# Patient Record
Sex: FEMALE | Race: OTHER | NOT HISPANIC OR LATINO | Employment: PART TIME | URBAN - METROPOLITAN AREA
[De-identification: names, ages, dates, MRNs, and addresses within clinical notes are randomized per-mention and may not be internally consistent; named-entity substitution may affect disease eponyms.]

---

## 2019-06-26 ENCOUNTER — HOSPITAL ENCOUNTER (OUTPATIENT)
Dept: LAB | Facility: HOSPITAL | Age: 54
Discharge: HOME | End: 2019-06-26
Attending: PSYCHIATRY & NEUROLOGY

## 2019-06-26 ENCOUNTER — HOSPITAL ENCOUNTER (OUTPATIENT)
Dept: SLEEP MEDICINE | Facility: HOSPITAL | Age: 54
Discharge: HOME | End: 2019-06-26
Attending: PSYCHIATRY & NEUROLOGY

## 2019-07-08 ENCOUNTER — HOSPITAL ENCOUNTER (OUTPATIENT)
Dept: LAB | Facility: HOSPITAL | Age: 54
Discharge: HOME | End: 2019-07-08
Attending: PSYCHIATRY & NEUROLOGY

## 2019-07-08 LAB
AMPHETAMINES UR QL SCN: POSITIVE
BARBITURATES UR QL SCN: NEGATIVE
BENZODIAZ UR QL SCN: NEGATIVE
CONV COCAINE, UR: NEGATIVE
METHADONE UR QL SCN: POSITIVE
OPIATES TESTED UR SCN: NEGATIVE
OXYCODONE UR QL SCN: NEGATIVE
PCP UR QL: NEGATIVE
THC SERPLBLD CFM-MCNC: NEGATIVE NG/ML

## 2019-10-23 ENCOUNTER — HOSPITAL ENCOUNTER (OUTPATIENT)
Dept: MRI IMAGING | Facility: HOSPITAL | Age: 54
Discharge: HOME | End: 2019-10-23
Attending: INTERNAL MEDICINE

## 2019-12-16 ENCOUNTER — HOSPITAL ENCOUNTER (OUTPATIENT)
Dept: URGENT CARE | Facility: CLINIC | Age: 54
Discharge: HOME | End: 2019-12-16
Attending: EMERGENCY MEDICINE

## 2020-02-14 ENCOUNTER — HOSPITAL ENCOUNTER (OUTPATIENT)
Dept: GENERAL RADIOLOGY | Facility: HOSPITAL | Age: 55
Discharge: HOME | End: 2020-02-14
Attending: INTERNAL MEDICINE

## 2020-04-16 ENCOUNTER — HOSPITAL ENCOUNTER (OUTPATIENT)
Dept: GENERAL RADIOLOGY | Facility: HOSPITAL | Age: 55
Discharge: HOME | End: 2020-04-16
Attending: INTERNAL MEDICINE

## 2020-11-05 ENCOUNTER — HOSPITAL ENCOUNTER (OUTPATIENT)
Dept: URGENT CARE | Facility: CLINIC | Age: 55
Discharge: HOME | End: 2020-11-05
Attending: INTERNAL MEDICINE

## 2020-11-07 LAB — SARS-COV-2 RNA SPEC QL NAA+PROBE: NOT DETECTED

## 2020-12-04 ENCOUNTER — HOSPITAL ENCOUNTER (OUTPATIENT)
Dept: GENERAL RADIOLOGY | Facility: HOSPITAL | Age: 55
Discharge: HOME | End: 2020-12-04
Attending: OTOLARYNGOLOGY

## 2020-12-14 ENCOUNTER — HOSPITAL ENCOUNTER (OUTPATIENT)
Dept: PREADMISSION TESTING | Facility: HOSPITAL | Age: 55
Discharge: HOME | End: 2020-12-14
Attending: OTOLARYNGOLOGY

## 2020-12-14 ENCOUNTER — HOSPITAL ENCOUNTER (OUTPATIENT)
Dept: OTHER | Facility: HOSPITAL | Age: 55
Discharge: HOME | End: 2020-12-14
Attending: OTOLARYNGOLOGY

## 2020-12-14 LAB
ANION GAP SERPL CALC-SCNC: 15 MMOL/L (ref 8–19)
APTT BLD: 23.2 S (ref 22.2–34.2)
BASOPHILS # BLD AUTO: 0.02 10*3/UL (ref 0–0.2)
BASOPHILS NFR BLD AUTO: 0.2 % (ref 0–3)
BUN SERPL-MCNC: 18 MG/DL (ref 5–25)
BUN/CREAT SERPL: 23 {RATIO} (ref 6–20)
CALCIUM SERPL-MCNC: 9.8 MG/DL (ref 8.7–10.4)
CHLORIDE SERPL-SCNC: 100 MMOL/L (ref 99–111)
CONV ABS IMM GRAN: 0.02 10*3/UL (ref 0–0.2)
CONV CO2: 29 MMOL/L (ref 22–32)
CONV IMMATURE GRAN: 0.2 % (ref 0–1.8)
CREAT UR-MCNC: 0.8 MG/DL (ref 0.5–0.9)
DEPRECATED RDW RBC AUTO: 43.3 FL (ref 36.4–46.3)
EOSINOPHIL # BLD AUTO: 0.04 10*3/UL (ref 0–0.7)
EOSINOPHIL # BLD AUTO: 0.5 % (ref 0–7)
ERYTHROCYTE [DISTWIDTH] IN BLOOD BY AUTOMATED COUNT: 13.2 % (ref 11.7–14.4)
GFR SERPLBLD BASED ON 1.73 SQ M-ARVRAT: >60 ML/MIN/{1.73_M2}
GLUCOSE SERPL-MCNC: 95 MG/DL (ref 65–99)
HCT VFR BLD AUTO: 42.6 % (ref 37–47)
HGB BLD-MCNC: 14.2 G/DL (ref 12–16)
INR PPP: 0.94 (ref 2–3)
LYMPHOCYTES # BLD AUTO: 3.3 10*3/UL (ref 1–5)
LYMPHOCYTES NFR BLD AUTO: 38.7 % (ref 20–45)
MCH RBC QN AUTO: 30 PG (ref 27–31)
MCHC RBC AUTO-ENTMCNC: 33.3 G/DL (ref 33–37)
MCV RBC AUTO: 90.1 FL (ref 81–99)
MONOCYTES # BLD AUTO: 0.4 10*3/UL (ref 0.2–1.2)
MONOCYTES NFR BLD AUTO: 4.7 % (ref 3–10)
NEUTROPHILS # BLD AUTO: 4.74 10*3/UL (ref 2–8)
NEUTROPHILS NFR BLD AUTO: 55.7 % (ref 30–85)
NRBC CBCN: 0 % (ref 0–0.7)
OSMOLALITY SERPL CALC.SUM OF ELEC: 292 MOSM/KG (ref 273–304)
PLATELET # BLD AUTO: 259 10*3/UL (ref 130–400)
PMV BLD AUTO: 11.3 FL (ref 9.4–12.3)
POTASSIUM SERPL-SCNC: 3.6 MMOL/L (ref 3.5–5.3)
PROTHROMBIN TIME: 10.2 S (ref 9.4–12)
RBC # BLD AUTO: 4.73 10*6/UL (ref 4.2–5.4)
SODIUM SERPL-SCNC: 140 MMOL/L (ref 135–147)
WBC # BLD AUTO: 8.52 10*3/UL (ref 4.8–10.8)

## 2020-12-16 LAB — SARS-COV-2 RNA SPEC QL NAA+PROBE: NOT DETECTED

## 2020-12-18 ENCOUNTER — HOSPITAL ENCOUNTER (OUTPATIENT)
Dept: PERIOP | Facility: HOSPITAL | Age: 55
Setting detail: HOSPITAL OUTPATIENT SURGERY
Discharge: HOME | End: 2020-12-18
Attending: OTOLARYNGOLOGY

## 2021-04-13 ENCOUNTER — HOSPITAL ENCOUNTER (OUTPATIENT)
Dept: SLEEP MEDICINE | Facility: HOSPITAL | Age: 56
Discharge: HOME | End: 2021-04-13
Attending: PSYCHIATRY & NEUROLOGY

## 2021-06-14 DIAGNOSIS — G47.419 NARCOLEPSY WITHOUT CATAPLEXY: Primary | ICD-10-CM

## 2021-06-14 RX ORDER — DEXTROAMPHETAMINE SACCHARATE, AMPHETAMINE ASPARTATE, DEXTROAMPHETAMINE SULFATE AND AMPHETAMINE SULFATE 2.5; 2.5; 2.5; 2.5 MG/1; MG/1; MG/1; MG/1
10 TABLET ORAL DAILY
Qty: 90 TABLET | Refills: 0 | Status: SHIPPED | OUTPATIENT
Start: 2021-06-14 | End: 2021-09-12

## 2021-07-27 PROCEDURE — U0003 INFECTIOUS AGENT DETECTION BY NUCLEIC ACID (DNA OR RNA); SEVERE ACUTE RESPIRATORY SYNDROME CORONAVIRUS 2 (SARS-COV-2) (CORONAVIRUS DISEASE [COVID-19]), AMPLIFIED PROBE TECHNIQUE, MAKING USE OF HIGH THROUGHPUT TECHNOLOGIES AS DESCRIBED BY CMS-2020-01-R: HCPCS | Performed by: NURSE PRACTITIONER

## 2021-07-27 PROCEDURE — U0005 INFEC AGEN DETEC AMPLI PROBE: HCPCS | Performed by: NURSE PRACTITIONER

## 2021-08-10 NOTE — PROGRESS NOTES
Subjective   Patient ID: Maia Hui is a 56 y.o. female is being seen for consultation today.  She is beng seen for low back pain with right  Sided sciatica.    3/2/21 MRI C spine [place]  3/2/21 MRI T spine [place]  3/2/21 MRI L spine [place]    Patient reports today low back pain with right and left sided sciatica.  She states she has neck pain also.When she turns her neck to the left she gets chest pain. The pain radiates to her arms and hands with numbness and tingling, weakness and heaviness.    I have not seen this patient in about 7 years.  She had an anterior cervical corpectomy at C6 with a cage and plate from C5 to C7 back in 2014.  She said she did fairly well for a while but her symptoms started to recur in the last few years or so. Mainly neck pain, some left scapular pain, and intermittent arm pain.  It was the right arm that was bothering her the most last time and that is generally doing well.   Also in the last year she has had low back pain and bilateral leg pain.  She takes Tylenol #3 from her primary care physician and she uses her ’s Flexeril.  Both help a bit.  She has not really been doing any exercise.  She has not been in any physical therapy recently.  We went over her radiographic studies.  Clearly she has some mechanical disease that can account for her symptoms but there is no severe nerve or root compression.  I certainly would not recommend any kind of surgery right now.   I think starting from basics again and putting her back in therapy and renewing her Flexeril would be helpful.  If that does not help then a referral to pain management in [place] would be helpful.  She does have a history of narcolepsy.            Back Pain  The pain is present in the lumbar spine. The quality of the pain is described as stabbing. The pain radiates to the left knee, right knee, left thigh and right thigh. The pain is at a severity of 4/10. The pain is worse during the night.  "The symptoms are aggravated by standing (activity). Associated symptoms include numbness, tingling and weakness. Pertinent negatives include no fever. She has tried home exercises, heat and analgesics for the symptoms. The treatment provided mild relief.   Neck Pain   The pain is present in the midline. The quality of the pain is described as stabbing. The pain is at a severity of 3/10. The symptoms are aggravated by position (looking up). The pain is same all the time. Associated symptoms include numbness, tingling and weakness. Pertinent negatives include no fever. She has tried home exercises and heat for the symptoms. The treatment provided mild relief.       The following portions of the patient's history were reviewed and updated as appropriate: allergies, current medications, past family history, past medical history, past social history, past surgical history and problem list.    Review of Systems   Constitutional: Negative for chills and fever.   HENT: Negative for congestion.    Eyes: Negative for visual disturbance.   Respiratory: Negative for shortness of breath.    Cardiovascular: Negative for palpitations.   Gastrointestinal: Negative for nausea.   Endocrine: Negative for cold intolerance.   Genitourinary: Positive for urgency.   Musculoskeletal: Positive for back pain and neck pain.   Skin: Negative for rash.   Neurological: Positive for tingling, weakness and numbness.   All other systems reviewed and are negative.          Objective     Vitals:    08/11/21 0921   BP: 128/78   Temp: 98 °F (36.7 °C)   Weight: 84.1 kg (185 lb 6.4 oz)   Height: 162.6 cm (64\")     Body mass index is 31.82 kg/m².      Physical Exam  Constitutional:       Appearance: She is well-developed.   HENT:      Head: Normocephalic and atraumatic.   Eyes:      Extraocular Movements: EOM normal.      Conjunctiva/sclera: Conjunctivae normal.      Pupils: Pupils are equal, round, and reactive to light.   Neck:      Vascular: No carotid " bruit.   Neurological:      Mental Status: She is oriented to person, place, and time.      Coordination: Finger-Nose-Finger Test and Heel to Shin Test normal.      Gait: Gait is intact.      Deep Tendon Reflexes:      Reflex Scores:       Tricep reflexes are 2+ on the right side and 2+ on the left side.       Bicep reflexes are 2+ on the right side and 2+ on the left side.       Brachioradialis reflexes are 2+ on the right side and 2+ on the left side.       Patellar reflexes are 2+ on the right side and 2+ on the left side.       Achilles reflexes are 2+ on the right side and 2+ on the left side.  Psychiatric:         Speech: Speech normal.       Neurologic Exam     Mental Status   Oriented to person, place, and time.   Registration of memory: Good recent and remote memory.   Attention: normal. Concentration: normal.   Speech: speech is normal   Level of consciousness: alert  Knowledge: consistent with education.     Cranial Nerves     CN II   Visual fields full to confrontation.   Visual acuity: normal    CN III, IV, VI   Pupils are equal, round, and reactive to light.  Extraocular motions are normal.     CN V   Facial sensation intact.   Right corneal reflex: normal  Left corneal reflex: normal    CN VII   Facial expression full, symmetric.   Right facial weakness: none  Left facial weakness: none    CN VIII   Hearing: intact    CN IX, X   Palate: symmetric    CN XI   Right sternocleidomastoid strength: normal  Left sternocleidomastoid strength: normal    CN XII   Tongue: not atrophic  Tongue deviation: none    Motor Exam   Muscle bulk: normal  Right arm tone: normal  Left arm tone: normal  Right leg tone: normal  Left leg tone: normal    Strength   Strength 5/5 except as noted.     Sensory Exam   Light touch normal.     Gait, Coordination, and Reflexes     Gait  Gait: normal    Coordination   Finger to nose coordination: normal  Heel to shin coordination: normal    Reflexes   Right brachioradialis: 2+  Left  brachioradialis: 2+  Right biceps: 2+  Left biceps: 2+  Right triceps: 2+  Left triceps: 2+  Right patellar: 2+  Left patellar: 2+  Right achilles: 2+  Left achilles: 2+  Right : 2+  Left : 2+          Assessment/Plan   Independent Review of Radiographic Studies:      I personally reviewed the images from the following studies.    I reviewed the cervical thoracic and lumbar MRIs done on 3/2/2021.  She is fused from C5-C7.  There is some adjacent level disease and disc bulging at C4-C5 and C7-T1.  The thoracic MRI is unremarkable not showing any cord compression or significant disc disease.  The lumbar MRI shows multilevel degenerative disc disease and some foraminal stenosis at L3-L4, L4-L5 and L5-S1 but frankly nothing dramatic.  There is no serious or severe cord compression in the neck as well.        Medical Decision Making:      Again, nothing operative.  I think physical therapy for both the neck and the low back is appropriate right now.  A referral to pain management would be reasonable if these things do not help.  I refilled the Flexeril for her own use.  We will see her in 4 months.      Diagnoses and all orders for this visit:    1. Cervical spinal stenosis (Primary)  -     Ambulatory Referral to Physical Therapy Evaluate and treat    2. DDD (degenerative disc disease), lumbar  -     Ambulatory Referral to Physical Therapy Evaluate and treat    3. History of fusion of cervical spine  -     Ambulatory Referral to Physical Therapy Evaluate and treat    Other orders  -     cyclobenzaprine (FLEXERIL) 10 MG tablet; Take 1 tablet by mouth 3 (Three) Times a Day As Needed for Muscle Spasms.  Dispense: 90 tablet; Refill: 3      Return in about 4 months (around 12/11/2021) for Face-to-face.

## 2021-08-11 ENCOUNTER — OFFICE VISIT (OUTPATIENT)
Dept: NEUROSURGERY | Facility: CLINIC | Age: 56
End: 2021-08-11

## 2021-08-11 VITALS
BODY MASS INDEX: 31.65 KG/M2 | WEIGHT: 185.4 LBS | TEMPERATURE: 98 F | SYSTOLIC BLOOD PRESSURE: 128 MMHG | HEIGHT: 64 IN | DIASTOLIC BLOOD PRESSURE: 78 MMHG

## 2021-08-11 DIAGNOSIS — Z98.1 HISTORY OF FUSION OF CERVICAL SPINE: ICD-10-CM

## 2021-08-11 DIAGNOSIS — M51.36 DDD (DEGENERATIVE DISC DISEASE), LUMBAR: ICD-10-CM

## 2021-08-11 DIAGNOSIS — M48.02 CERVICAL SPINAL STENOSIS: Primary | ICD-10-CM

## 2021-08-11 PROBLEM — M51.369 DDD (DEGENERATIVE DISC DISEASE), LUMBAR: Status: ACTIVE | Noted: 2021-08-11

## 2021-08-11 PROCEDURE — 99204 OFFICE O/P NEW MOD 45 MIN: CPT | Performed by: NEUROLOGICAL SURGERY

## 2021-08-11 RX ORDER — CYCLOBENZAPRINE HCL 10 MG
10 TABLET ORAL 3 TIMES DAILY PRN
Qty: 90 TABLET | Refills: 3 | Status: SHIPPED | OUTPATIENT
Start: 2021-08-11

## 2021-08-11 RX ORDER — NALOXONE HYDROCHLORIDE 4 MG/.1ML
SPRAY NASAL
COMMUNITY
Start: 2021-05-04

## 2021-08-16 ENCOUNTER — TRANSCRIBE ORDERS (OUTPATIENT)
Dept: ADMINISTRATIVE | Facility: HOSPITAL | Age: 56
End: 2021-08-16

## 2021-08-16 DIAGNOSIS — L03.211 FACIAL CELLULITIS: ICD-10-CM

## 2021-08-16 DIAGNOSIS — J32.9 CHRONIC SINUSITIS, UNSPECIFIED LOCATION: Primary | ICD-10-CM

## 2021-08-23 ENCOUNTER — TELEPHONE (OUTPATIENT)
Dept: NEUROSURGERY | Facility: CLINIC | Age: 56
End: 2021-08-23

## 2021-08-24 ENCOUNTER — TELEPHONE (OUTPATIENT)
Dept: NEUROSURGERY | Facility: CLINIC | Age: 56
End: 2021-08-24

## 2021-08-24 NOTE — TELEPHONE ENCOUNTER
Called and left message with patient, re: her fall a couple days ago with pain in the right leg and feeling cold, and onset of h/a and unsure if she hit her head.  Explained that she should go to the ER to get checked for possible fracture, or head bleed.

## 2021-08-26 ENCOUNTER — APPOINTMENT (OUTPATIENT)
Dept: CT IMAGING | Facility: HOSPITAL | Age: 56
End: 2021-08-26

## 2021-09-01 ENCOUNTER — TREATMENT (OUTPATIENT)
Dept: PHYSICAL THERAPY | Facility: CLINIC | Age: 56
End: 2021-09-01

## 2021-09-01 DIAGNOSIS — G89.29 CHRONIC MIDLINE LOW BACK PAIN WITH RIGHT-SIDED SCIATICA: ICD-10-CM

## 2021-09-01 DIAGNOSIS — M54.41 CHRONIC MIDLINE LOW BACK PAIN WITH RIGHT-SIDED SCIATICA: ICD-10-CM

## 2021-09-01 DIAGNOSIS — M54.12 RADICULOPATHY, CERVICAL: ICD-10-CM

## 2021-09-01 DIAGNOSIS — M54.2 CERVICAL PAIN: Primary | ICD-10-CM

## 2021-09-01 PROCEDURE — 97110 THERAPEUTIC EXERCISES: CPT | Performed by: PHYSICAL THERAPIST

## 2021-09-01 PROCEDURE — 97162 PT EVAL MOD COMPLEX 30 MIN: CPT | Performed by: PHYSICAL THERAPIST

## 2021-09-01 NOTE — PROGRESS NOTES
Physical Therapy Initial Evaluation and Plan of Care        Patient: Maia Hui   : 1965  Diagnosis/ICD-10 Code:  Cervical pain [M54.2]   Referring practitioner: Michael Joya MD  Date of Initial Visit: 2021  Today's Date: 2021  Patient seen for 1 sessions           Subjective Questionnaire: Oswestry:       Subjective Evaluation    History of Present Illness  Mechanism of injury: Patient reports that she has chronic lumbar and cervical spine pain for many years with no particular KAREY. Patient reports that she had a Cervical fusion of levels C 5-6,6-7 about 5 years ago.  Patient fell off a ladder last week twice.  Patient reports that she fell on her sacrum and then onto her right ankle.  Patient was placed in a R CAM boot last week and is waiting on an MRI.  Patient has had 3 previous surgeries on her R foot.  Since the fall her lower back and cervical pain has been flared up.  Patient reports radicular pain down her L UE to her thumb and first 2 fingers.  Patient reports that she is having radicular R LE pain to her calf.  Patient reports that she is taking flexoril.  Patient reports prolonged sitting, prolonged walking and standing, cervical extension and driving increases her pain.  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    Pain  Current pain rating: 3  At best pain rating: 3  At worst pain rating: 10  Aggravating factors: ambulation, lifting, stairs, sleeping, standing, movement, repetitive movement, outstretched reach and prolonged positioning  Progression: worsening    Social Support  Lives with: spouse    Hand dominance: right    Treatments  Previous treatment: physical therapy  Patient Goals  Patient goals for therapy: decreased pain, improved balance, increased motion, increased strength, independence with ADLs/IADLs and return to sport/leisure activities             Objective    Upper trapezius and sub-occiptial tightness noted  bilaterally  Hamstring and piriformis tightness noted bilaterally  Lumbar paraspinal tenderness and tightness noted bilaterally       Static Posture     Head  Forward.    Shoulders  Rounded.    Scapulae  Left protracted and right protracted.    Thoracic Spine  Hyperkyphosis.    Lumbar Spine   Decreased lordosis.     Knee   Genu valgus.   Knee (Left): Flexed.     Active Range of Motion   Cervical/Thoracic Spine   Cervical    Flexion: 26 degrees   Extension: 20 degrees   Left lateral flexion: 35 degrees   Right lateral flexion: 30 degrees     Additional Active Range of Motion Details  Lumbar AROM: all painful  Flexion: 25% limited  Extension: 75% limited  Lateral flexion: 75% limited bilaterally  Rotation: 75% limited bilaterally    Strength/Myotome Testing     Left Shoulder     Planes of Motion   Flexion: 4   Extension: 4   Abduction: 4     Right Shoulder     Planes of Motion   Flexion: 4   Extension: 4   Abduction: 4     Left Elbow   Flexion: 4+  Extension: 4+    Right Elbow   Flexion: 4+  Extension: 4+    Left Wrist/Hand   Wrist extension: 4+  Wrist flexion: 4+    Right Wrist/Hand   Wrist extension: 4+  Wrist flexion: 4+    Left Hip   Planes of Motion   Flexion: 4  Extension: 4-  Abduction: 4-    Right Hip   Planes of Motion   Flexion: 4-  Extension: 4-  Abduction: 4-    Left Knee   Flexion: 4+  Extension: 4+    Right Knee   Flexion: 4+  Extension: 4+    Left Ankle/Foot   Dorsiflexion: 4+    Right Ankle/Foot   Right ankle dorsiflexion strength: NT due to CAM boot.    Tests   Cervical     Left   Positive active compression ([place]).     Right   Positive active compression ([place]).     Lumbar     Left   Positive passive SLR and quadrant.     Right   Positive passive SLR and quadrant.      General Comments     Shoulder Comments   Bilateral UE and LE light touch sensation intact         Assessment & Plan     Assessment  Impairments: abnormal gait, abnormal muscle firing, abnormal or restricted ROM, activity  intolerance, impaired balance, impaired physical strength, lacks appropriate home exercise program, pain with function, safety issue and weight-bearing intolerance  Assessment details: Patient presents with signs/symptoms consistent with lower back pain with R LE radiculopathy and cervical pain with L UE radiculopathy including decreased lumbar/cervical ROM, bilateral hip, shoulder, scapular and core weakness and reports of pain limiting function during ADLs as shown on the WINIFRED. Patient would benefit from skilled PT services in order to address remaining deficits limiting function at this time.       Prognosis: good  Functional Limitations: carrying objects, lifting, sleeping, walking, pulling, pushing, uncomfortable because of pain, moving in bed, sitting, standing, stooping, reaching overhead and unable to perform repetitive tasks  Goals  Plan Goals: 1. The patient complains of lumbar/cervcial back pain.  LTG 1: 12 weeks:  The patient will report a pain rating of 2 or better in order to improve  tolerance to activities of daily living and improve sleep quality.  STATUS:  New  STG 1a: 6 weeks:  The patient will report a pain rating of 4 or better.  STATUS:  New  TREATMENT:  Therapeutic exercises, manual therapy, aquatic therapy, home exercise   instruction, and modalities as needed for pain to include:  electrical stimulation, moist heat, ice,   ultrasound, and diathermy.      2. The patient demonstrates weakness of the bilateral hips.  LTG 2: 12 weeks:  The patient will demonstrate 4+ /5 strength for bilateral hip flexion, abduction,  and extension in order to improve hip stability.  STATUS:  New  STG 2a: 6 weeks:  The patient will demonstrate 4 /5 strength for bilateral hip flexion, abduction,  and extension.  STATUS:  New  TREATMENT: Therapeutic exercises, manual therapy, aquatic therapy, home exercise instruction,  and modalities as needed for pain to include:  electrical stimulation, moist heat, ice,  ultrasound, and   diathermy.      3. The patient has gait dysfunction.  LTG 3: 12 weeks:  The patient will ambulate without assistive device, independently, for   community distances with minimal limp to the R lower extremity in order to improve mobility and allow   patient to perform activities such as grocery shopping with greater ease.  STATUS:  New  TREATMENT: Gait training, aquatic therapy, therapeutic exercise, and home exercise instruction.    4. The patient has limited cervical AROM  LTG 4: 12 weeks:  The patient will demonstrate lumbar AROM as follows: 35 degrees of flexion and 35 degrees of extension, 60 degrees of rotation bilaterally.  STATUS:  New  TREATMENT: Manual therapy, therapeutic exercise, home exercise instruction, and modalities as needed to include: moist heat, electrical stimulation, and ultrasound.      4. Mobility: Walking/Moving Around Functional Limitation    LTG 4: 12 weeks:  The patient will demonstrate 1-19 % limitation by achieving a score of 8/45 on the WINIFRED.  STATUS:  New  TREATMENT:  Manual therapy, therapeutic exercise, home exercise instruction, and modalities as needed to include: moist heat, electrical stimulation, and ultrasound.    Plan  Therapy options: will be seen for skilled physical therapy services  Planned modality interventions: cryotherapy, electrical stimulation/Russian stimulation and TENS  Planned therapy interventions: balance/weight-bearing training, ADL retraining, soft tissue mobilization, strengthening, stretching, therapeutic activities, joint mobilization, home exercise program, gait training, functional ROM exercises, flexibility, body mechanics training, postural training, neuromuscular re-education and manual therapy  Frequency: 3x week  Duration in weeks: 12  Treatment plan discussed with: patient        Timed:         Manual Therapy:    0     mins  33725;     Therapeutic Exercise:    10     mins  25665;     Neuromuscular Partha:    0    mins  55264;     Therapeutic Activity:     0     mins  22292;     Gait Trainin     mins  21109;     Ultrasound:     0     mins  01292;    Ionto                               0    mins   98451  Self-care  __0__ mins 07925    Un-Timed:  Electrical Stimulation:    0     mins  34497 ( );  Traction     0     mins 25557  Low Eval     0     Mins  89683  Mod Eval     40     Mins  13445  High Eval                       0     Mins  05361  Hot pack     0     Mins    Cold pack                       0     Mins      Timed Treatment:   10   mins   Total Treatment:     50   mins    PT SIGNATURE: Melissa Farah, MONIKA   DATE TREATMENT INITIATED: 2021    Initial Certification  Certification Period: 2021  I certify that the therapy services are furnished while this patient is under my care.  The services outlined above are required by this patient, and will be reviewed every 90 days.     PHYSICIAN: Michael Joya MD      DATE:     Please sign and return via fax to [phone].. Thank you, [facility].

## 2021-09-07 ENCOUNTER — HOSPITAL ENCOUNTER (OUTPATIENT)
Dept: CT IMAGING | Facility: HOSPITAL | Age: 56
Discharge: HOME | End: 2021-09-07
Admitting: PHYSICIAN ASSISTANT

## 2021-09-07 DIAGNOSIS — L03.211 FACIAL CELLULITIS: ICD-10-CM

## 2021-09-07 DIAGNOSIS — J32.9 CHRONIC SINUSITIS, UNSPECIFIED LOCATION: ICD-10-CM

## 2021-09-07 PROCEDURE — 70486 CT MAXILLOFACIAL W/O DYE: CPT

## 2021-09-09 ENCOUNTER — TREATMENT (OUTPATIENT)
Dept: PHYSICAL THERAPY | Facility: CLINIC | Age: 56
End: 2021-09-09

## 2021-09-09 DIAGNOSIS — M54.41 CHRONIC MIDLINE LOW BACK PAIN WITH RIGHT-SIDED SCIATICA: ICD-10-CM

## 2021-09-09 DIAGNOSIS — M54.2 CERVICAL PAIN: Primary | ICD-10-CM

## 2021-09-09 DIAGNOSIS — M54.12 RADICULOPATHY, CERVICAL: ICD-10-CM

## 2021-09-09 DIAGNOSIS — G89.29 CHRONIC MIDLINE LOW BACK PAIN WITH RIGHT-SIDED SCIATICA: ICD-10-CM

## 2021-09-09 PROCEDURE — 97110 THERAPEUTIC EXERCISES: CPT | Performed by: PHYSICAL THERAPIST

## 2021-09-09 PROCEDURE — 97140 MANUAL THERAPY 1/> REGIONS: CPT | Performed by: PHYSICAL THERAPIST

## 2021-09-09 NOTE — PROGRESS NOTES
"Physical Therapy Daily Progress Note        Patient: Maia Hui   : 1965  Diagnosis/ICD-10 Code:  Cervical pain [M54.2]  Referring practitioner: No ref. provider found  Date of Initial Visit: No linked episodes  Today's Date: 2021  Patient seen for Visit count could not be calculated. Make sure you are using a visit which is associated with an episode. sessions             Subjective   Maia Hui reports having general increase in pain and discomfort in neck, left arm, and lower back.  She reports persistent intermittent sharp -\"Stabbing\" pain left pectoralis with cervical rotation left.    Objective     Postural Observations:  Forward head - Rounded shoulder posture    See Exercise and Manual Logs for complete treatment.       Assessment/Plan   Pt tolerated therapy session moderately well - with initiation of therapeutic exercises and Manual therapy. She continues to have deficits in Cervical, Trunk, and bilateral Upper Extremty Flexibility,  Strength, and Stability; limiting function and ability to perform ADLs at this time.  Pt reported decrease in tightness in cervical musculature post session after Manual Therapy today.        Progress per Plan of Care           Timed:  Manual Therapy:    10     mins  32695;  Therapeutic Exercise:    28     mins  94344;     Neuromuscular Partha:    0    mins  11485;    Therapeutic Activity:     0     mins  90326;     Gait Trainin     mins  16341;     Ultrasound:     0     mins  39738;    Electrical Stimulation:    0     mins  30760;  Iontophoresis     0     mins  90756    Untimed:  Electrical Stimulation:    0     mins  83993 ( );  Mechanical Traction:    0     mins  69168;   Fluidotherapy     0     mins  30873  Hot pack     0     mins  39031  Cold pack     0     mins  48164    Timed Treatment:   38   mins   Total Treatment:     38   mins        Myra Askew PTA  Physical Therapist Assistant  "

## 2021-09-13 ENCOUNTER — TREATMENT (OUTPATIENT)
Dept: PHYSICAL THERAPY | Facility: CLINIC | Age: 56
End: 2021-09-13

## 2021-09-13 DIAGNOSIS — M54.12 RADICULOPATHY, CERVICAL: ICD-10-CM

## 2021-09-13 DIAGNOSIS — M54.41 CHRONIC MIDLINE LOW BACK PAIN WITH RIGHT-SIDED SCIATICA: ICD-10-CM

## 2021-09-13 DIAGNOSIS — M54.2 CERVICAL PAIN: Primary | ICD-10-CM

## 2021-09-13 DIAGNOSIS — G89.29 CHRONIC MIDLINE LOW BACK PAIN WITH RIGHT-SIDED SCIATICA: ICD-10-CM

## 2021-09-13 PROCEDURE — 97140 MANUAL THERAPY 1/> REGIONS: CPT | Performed by: PHYSICAL THERAPIST

## 2021-09-13 PROCEDURE — 97110 THERAPEUTIC EXERCISES: CPT | Performed by: PHYSICAL THERAPIST

## 2021-09-13 NOTE — PROGRESS NOTES
Physical Therapy Daily Progress Note        Patient: Maia Hui   : 1965  Diagnosis/ICD-10 Code:  Cervical pain [M54.2]  Referring practitioner: Michael Joya MD  Date of Initial Visit: Type: THERAPY  Noted: 2021  Today's Date: 2021  Patient seen for 3 sessions             Subjective   Maia Hui reports: left low back really bothering her last night and unable to get comfortable. She was trying to do her exercises but they didn't seem to help relieve the pain.     Objective   Increased discomfort with R Cervical Unilateral Posterior Anterior Mobs.     See Exercise, Manual, and Modality Logs for complete treatment.       Assessment/Plan  Maia still experiencing increased neck and low back  pain, especially at night. Pt had some increased discomfort with R Cervical UPAs. Pt would benefit from skilled PT to address Range of Motion  and Strength deficits, pain management and any concerns with ADLs.     Progress per Plan of Care           Timed:  Manual Therapy:    15     mins  21635;  Therapeutic Exercise:    10     mins  84568;     Neuromuscular Partha:        mins  72282;    Therapeutic Activity:          mins  54992;     Gait Training:           mins  59219;    Aquatic Therapy:          mins  17906;       Untimed:  Electrical Stimulation:         mins  45652 ( );  Mechanical Traction:         mins  94531;       Timed Treatment:   25   mins   Total Treatment:     25   mins        Marylu Duque PTA  Physical Therapist Assistant

## 2021-09-17 ENCOUNTER — TRANSCRIBE ORDERS (OUTPATIENT)
Dept: PHYSICAL THERAPY | Facility: CLINIC | Age: 56
End: 2021-09-17

## 2021-09-17 ENCOUNTER — TREATMENT (OUTPATIENT)
Dept: PHYSICAL THERAPY | Facility: CLINIC | Age: 56
End: 2021-09-17

## 2021-09-17 DIAGNOSIS — M25.571 RIGHT ANKLE PAIN, UNSPECIFIED CHRONICITY: Primary | ICD-10-CM

## 2021-09-17 DIAGNOSIS — M54.41 CHRONIC MIDLINE LOW BACK PAIN WITH RIGHT-SIDED SCIATICA: ICD-10-CM

## 2021-09-17 DIAGNOSIS — M54.2 CERVICAL PAIN: Primary | ICD-10-CM

## 2021-09-17 DIAGNOSIS — M54.12 RADICULOPATHY, CERVICAL: ICD-10-CM

## 2021-09-17 DIAGNOSIS — M25.571 RIGHT ANKLE PAIN, UNSPECIFIED CHRONICITY: ICD-10-CM

## 2021-09-17 DIAGNOSIS — G89.29 CHRONIC MIDLINE LOW BACK PAIN WITH RIGHT-SIDED SCIATICA: ICD-10-CM

## 2021-09-17 PROCEDURE — 97164 PT RE-EVAL EST PLAN CARE: CPT | Performed by: PHYSICAL THERAPIST

## 2021-09-17 PROCEDURE — 97110 THERAPEUTIC EXERCISES: CPT | Performed by: PHYSICAL THERAPIST

## 2021-09-17 NOTE — PROGRESS NOTES
Re-Assessment / Re-Certification      Patient: Maia Hui   : 1965  Diagnosis/ICD-10 Code:  Cervical pain [M54.2]  Referring practitioner: Michael Joya MD  Date of Initial Visit: Type: THERAPY  Noted: 2021  Today's Date: 2021  Patient seen for 4 sessions    Progress note due: 10/17/2021  Re-cert due: 2021    Subjective:     Subjective Questionnaire: LEFS: 38/80      Subjective Evaluation    History of Present Illness    Subjective comment: Pt states she fell 3 weeks ago and injured her R ankle. She had MRI last friday and there was nothing wrong. She had prevoius surguries in R ankle but this fall did not cause any damage.   Precautions and Work Restrictions: R boot during walking. Pain  Current pain ratin  At worst pain ratin         Objective          Passive Range of Motion   Left Ankle/Foot    Dorsiflexion (ke): 10 degrees   Plantar flexion: 35 degrees   Inversion: 25 degrees   Eversion: 15 degrees     Right Ankle/Foot    Dorsiflexion (ke): 5 degrees   Plantar flexion: 35 degrees   Inversion: 30 degrees   Eversion: 20 degrees     Strength/Myotome Testing     Left Ankle/Foot   Dorsiflexion: 5  Plantar flexion: 5  Inversion: 5  Eversion: 5    Right Ankle/Foot   Dorsiflexion: 4+  Plantar flexion: 4+  Inversion: 4+  Eversion: 4-    Swelling   Left Ankle/Foot   Figure 8: 57 cm    Right Ankle/Foot   Figure 8: 55 cm    Ambulation   Weight-Bearing Status   Weight-Bearing Status (Left): weight-bearing as tolerated   Weight-Bearing Status (Right): weight-bearing as tolerated     Additional Weight-Bearing Status Details  R boot     Observational Gait   Gait: antalgic   Decreased walking speed, stride length, right stance time, right swing time and right step length.       Assessment & Plan     Assessment  Impairments: abnormal gait, abnormal or restricted ROM, activity intolerance, impaired balance, impaired physical strength, pain with function and weight-bearing  intolerance  Functional Limitations: walking, uncomfortable because of pain, standing and unable to perform repetitive tasks  Goals  Plan Goals: ANKLE  PROBLEMS:  1. The patient has limited ROM for the L ankle.   LTG 1: 8 weeks:  In order to allow the patient greater ease with forward, lateral, and diagonal mobility the patient will demonstrate improved ROM of the L ankle as follows:  10 degrees of dorsiflexion, 35 degrees of inversion, and 20 degrees of eversion.    STATUS:  NEW     TREATMENT: Manual therapy, therapeutic exercise, home exercise instruction, and modalities as needed to include:  moist heat, electrical stimulation, ultrasound, and ice.    2. The patient has limited strength of the R ankle.   LTG 2: 8 weeks:  In order to provide greater joint stability of the R ankle the patient will demonstrate improved strength of the R ankle as follows:  5/5 dorsiflexion, 5/5 inversion, 5/5 eversion, and 5/5 plantar flexion.    STATUS:  NEW       TREATMENT: Therapeutic exercise, home exercise instruction, aquatic therapy.    3. The patient has gait dysfunction.   LTG 3: 8 weeks:  The patient will ambulate without assistive device, independently, for community distances with minimal limp to the R lower extremity in order to improve mobility and allow patient to perform activities such as grocery shopping with greater ease.    STATUS:  NEW      TREATMENT: Gait training, aquatic therapy, therapeutic exercise, and home exercise instruction.    4. Mobility: Walking/Moving Around Functional Limitation     LTG 4: 8 weeks:  The patient will demonstrate LEFS score to 58/80 to decrease limitation.     STATUS:  NEW   STG4a: 4 weeks:  The patient will demonstrate  LEFS score to 48/80 to decrease limitation.     STATUS:  NEW   TREATMENT:  Manual therapy, therapeutic exercise, home exercise instruction, and modalities as needed to include: moist heat, electrical stimulation, and ultrasound.        Plan  Therapy options: will be  seen for skilled physical therapy services  Planned modality interventions: cryotherapy, TENS and thermotherapy (hydrocollator packs)  Planned therapy interventions: balance/weight-bearing training, flexibility, functional ROM exercises, gait training, home exercise program, joint mobilization, manual therapy, neuromuscular re-education, soft tissue mobilization, strengthening, stretching and therapeutic activities  Frequency: 2x week  Duration in weeks: 8        Visit Diagnoses:    ICD-10-CM ICD-9-CM   1. Cervical pain  M54.2 723.1   2. Radiculopathy, cervical  M54.12 723.4   3. Chronic midline low back pain with right-sided sciatica  M54.41 724.2    G89.29 724.3     338.29   4. Right ankle pain, unspecified chronicity  M25.571 719.47         Recommendations: Continue as planned  Timeframe: 2 months  Prognosis to achieve goals: good    PT Signature: Hellen Chavarria PT, DPT      Based upon review of the patient's progress and continued therapy plan, it is my medical opinion that Maia Hui should continue physical therapy treatment at [facility]  [address] [place] [id number] [phone].    Signature: __________________________________  Michael Joya MD    Timed:         Manual Therapy:    0     mins  78759;     Therapeutic Exercise:    8     mins  70483;     Neuromuscular Partha:    0    mins  47684;    Therapeutic Activity:     0     mins  83231;     Gait Trainin     mins  43539;     Ultrasound:     0     mins  77113;    Ionto                               0    mins   01387  Self-care  __0__ mins 00763    Un-Timed:  Electrical Stimulation:    0     mins  84560 (MC );  Traction     0     mins 04342  Re- Eval     20     Mins     Hot pack     0     Mins    Cold pack                       0     Mins      Timed Treatment:   8   mins   Total Treatment:     23   mins

## 2021-09-24 ENCOUNTER — TREATMENT (OUTPATIENT)
Dept: PHYSICAL THERAPY | Facility: CLINIC | Age: 56
End: 2021-09-24

## 2021-09-24 DIAGNOSIS — M54.2 CERVICAL PAIN: Primary | ICD-10-CM

## 2021-09-24 DIAGNOSIS — M54.41 CHRONIC MIDLINE LOW BACK PAIN WITH RIGHT-SIDED SCIATICA: ICD-10-CM

## 2021-09-24 DIAGNOSIS — M25.571 RIGHT ANKLE PAIN, UNSPECIFIED CHRONICITY: ICD-10-CM

## 2021-09-24 DIAGNOSIS — M54.12 RADICULOPATHY, CERVICAL: ICD-10-CM

## 2021-09-24 DIAGNOSIS — G89.29 CHRONIC MIDLINE LOW BACK PAIN WITH RIGHT-SIDED SCIATICA: ICD-10-CM

## 2021-09-24 PROCEDURE — 97110 THERAPEUTIC EXERCISES: CPT | Performed by: PHYSICAL THERAPIST

## 2021-09-24 NOTE — PROGRESS NOTES
Physical Therapy Daily Progress Note        Patient: Maia Hui   : 1965  Diagnosis/ICD-10 Code:  Cervical pain [M54.2]  Referring practitioner: Michael Joya MD  Date of Initial Visit: Type: THERAPY  Noted: 2021  Today's Date: 2021  Patient seen for 5 sessions             Subjective   Maia Hui reports: no significant pain at this time.    Objective   Right ankle DF AROM: 7  See Exercise, Manual, and Modality Logs for complete treatment.       Assessment/Plan  Patient tolerated all exercise progressions and manual therapy well today but continues to demonstrate strength/ROM deficits limiting function. Continue to progress per patient tolerance.    Progress per Plan of Care           Timed:  Manual Therapy:    6     mins  86715;  Therapeutic Exercise:    24     mins  16025;     Neuromuscular Partha:    0    mins  29744;    Therapeutic Activity:     0     mins  47928;     Gait Trainin     mins  27273;     Ultrasound:     0     mins  06093;    Electrical Stimulation:    0     mins  08628;  Iontophoresis     0     mins  31711    Untimed:  Electrical Stimulation:    0     mins  29053 ( );  Mechanical Traction:    0     mins  31237;   Fluidotherapy     0     mins  38509  Hot pack     0     Mins    Cold pack                       0     Mins     Timed Treatment:   30   mins   Total Treatment:     30   mins        Melissa Farah PT  Physical Therapist

## 2021-09-28 DIAGNOSIS — G47.419 NARCOLEPSY WITHOUT CATAPLEXY: Primary | ICD-10-CM

## 2021-09-28 RX ORDER — DEXTROAMPHETAMINE SACCHARATE, AMPHETAMINE ASPARTATE MONOHYDRATE, DEXTROAMPHETAMINE SULFATE AND AMPHETAMINE SULFATE 5; 5; 5; 5 MG/1; MG/1; MG/1; MG/1
20 CAPSULE, EXTENDED RELEASE ORAL EVERY MORNING
Qty: 30 CAPSULE | Refills: 0 | OUTPATIENT
Start: 2021-09-28

## 2021-09-28 RX ORDER — DEXTROAMPHETAMINE SACCHARATE, AMPHETAMINE ASPARTATE, DEXTROAMPHETAMINE SULFATE AND AMPHETAMINE SULFATE 2.5; 2.5; 2.5; 2.5 MG/1; MG/1; MG/1; MG/1
TABLET ORAL
Qty: 90 TABLET | Refills: 0 | Status: SHIPPED | OUTPATIENT
Start: 2021-09-28 | End: 2021-12-16 | Stop reason: SDUPTHER

## 2021-09-29 ENCOUNTER — TREATMENT (OUTPATIENT)
Dept: PHYSICAL THERAPY | Facility: CLINIC | Age: 56
End: 2021-09-29

## 2021-09-29 DIAGNOSIS — M25.571 RIGHT ANKLE PAIN, UNSPECIFIED CHRONICITY: ICD-10-CM

## 2021-09-29 DIAGNOSIS — M54.2 CERVICAL PAIN: Primary | ICD-10-CM

## 2021-09-29 DIAGNOSIS — M54.41 CHRONIC MIDLINE LOW BACK PAIN WITH RIGHT-SIDED SCIATICA: ICD-10-CM

## 2021-09-29 DIAGNOSIS — M54.12 RADICULOPATHY, CERVICAL: ICD-10-CM

## 2021-09-29 DIAGNOSIS — G89.29 CHRONIC MIDLINE LOW BACK PAIN WITH RIGHT-SIDED SCIATICA: ICD-10-CM

## 2021-09-29 PROCEDURE — 97140 MANUAL THERAPY 1/> REGIONS: CPT | Performed by: PHYSICAL THERAPIST

## 2021-09-29 PROCEDURE — 97110 THERAPEUTIC EXERCISES: CPT | Performed by: PHYSICAL THERAPIST

## 2021-09-29 NOTE — PROGRESS NOTES
Physical Therapy Daily Progress Note        Patient: Maia Hui   : 1965  Diagnosis/ICD-10 Code:  Cervical pain [M54.2]  Referring practitioner: Michael Joya MD  Date of Initial Visit: No linked episodes  Today's Date: 2021  Patient seen for Visit count could not be calculated. Make sure you are using a visit which is associated with an episode. sessions             Subjective   Maia Hui reports that she has decided to come out of her boot on her right because it has been aggravating her back.  She denies having any pain in Right ankle today, but does continue to report having pain in left cervical and lower back- rating 3/10 upon arrival today.    Objective     Right Ankle Strength:   Dorsiflexion: 4+/5  Plantar flexion: 4+/5  Inversion: 4+/5  Eversion: 4-/5    Decreased eccentric foot / ankle strength    See Exercise, Manual, and Modality Logs for complete treatment.       Assessment/Plan     Pt tolerated therapy session moderately well - with initiation of therapeutic exercises and Manual therapy. She continues to have deficits in Cervical, Trunk, and Right Ankle Flexibility,  Strength, and Stability; limiting function and ability to perform ADLs at this time. Decrease in complaints of right ankle/foot pain reported.    Progress per Plan of Care           Timed:  Manual Therapy:    8     mins  03432;  Therapeutic Exercise:    45     mins  95775;     Neuromuscular Partha:    0    mins  60615;    Therapeutic Activity:     0     mins  61648;     Gait Trainin     mins  18404;     Ultrasound:     0     mins  50595;    Electrical Stimulation:    0     mins  43005;  Iontophoresis     0     mins  55979    Untimed:  Electrical Stimulation:    0     mins  73953 ( );  Mechanical Traction:    0     mins  35528;   Fluidotherapy     0     mins  15369  Hot pack     0     mins  21589  Cold pack     0     mins  10616    Timed Treatment:   53   mins   Total Treatment:     53    micheline Askew PTA  Physical Therapist Assistant

## 2021-09-30 ENCOUNTER — TREATMENT (OUTPATIENT)
Dept: PHYSICAL THERAPY | Facility: CLINIC | Age: 56
End: 2021-09-30

## 2021-09-30 DIAGNOSIS — M54.41 CHRONIC MIDLINE LOW BACK PAIN WITH RIGHT-SIDED SCIATICA: ICD-10-CM

## 2021-09-30 DIAGNOSIS — G89.29 CHRONIC MIDLINE LOW BACK PAIN WITH RIGHT-SIDED SCIATICA: ICD-10-CM

## 2021-09-30 DIAGNOSIS — M54.2 CERVICAL PAIN: Primary | ICD-10-CM

## 2021-09-30 DIAGNOSIS — M54.12 RADICULOPATHY, CERVICAL: ICD-10-CM

## 2021-09-30 DIAGNOSIS — M25.571 RIGHT ANKLE PAIN, UNSPECIFIED CHRONICITY: ICD-10-CM

## 2021-09-30 PROCEDURE — 97140 MANUAL THERAPY 1/> REGIONS: CPT | Performed by: PHYSICAL THERAPIST

## 2021-09-30 PROCEDURE — 97110 THERAPEUTIC EXERCISES: CPT | Performed by: PHYSICAL THERAPIST

## 2021-09-30 NOTE — PROGRESS NOTES
Physical Therapy Daily Progress Note        Patient: Maia Hui   : 1965  Diagnosis/ICD-10 Code:  Cervical pain [M54.2]  Referring practitioner: Michael Joya MD  Date of Initial Visit: Type: THERAPY  Noted: 2021  Today's Date: 2021  Patient seen for 7 sessions             Subjective   Maia Hui reports that her right ankle has been feeling better.  She reports having more discomfort at her Left Neck and lower back today.  She rates her pain 2/10 upon arrival today.    Objective     SLS:  20 sec Left  / Right 10-20 sec (Increased shaking noted Right LE)    See Exercise and Manual Logs for complete treatment.       Assessment/Plan     Pt tolerated therapy session moderately well - with progression of therapeutic exercises and Manual therapy. She continues to have deficits in Cervical, Trunk, and Right Ankle Flexibility,  Strength, and Stability; limiting function and ability to perform ADLs at this time. Decrease in complaints of right ankle/foot pain reported.  Pt continues to respond well to manual Therapy and soft tissue mobilization at cervical musculature - reporting decrease in pain and tightness post session today.    Progress per Plan of Care           Timed:  Manual Therapy:    15     mins  58488;  Therapeutic Exercise:    40     mins  75928;     Neuromuscular Partha:    0    mins  73771;    Therapeutic Activity:     0     mins  27539;     Gait Trainin     mins  12577;     Ultrasound:     0     mins  89144;    Electrical Stimulation:    0     mins  99149;  Iontophoresis     0     mins  07753    Untimed:  Electrical Stimulation:    0     mins  74747 (MC );  Mechanical Traction:    0     mins  55266;   Fluidotherapy     0     mins  91112  Hot pack     0     mins  63423  Cold pack     0     mins  97710    Timed Treatment:   55   mins   Total Treatment:     55   mins        Myra Askew PTA  Physical Therapist Assistant

## 2021-10-04 ENCOUNTER — TREATMENT (OUTPATIENT)
Dept: PHYSICAL THERAPY | Facility: CLINIC | Age: 56
End: 2021-10-04

## 2021-10-04 DIAGNOSIS — M54.2 CERVICAL PAIN: Primary | ICD-10-CM

## 2021-10-04 DIAGNOSIS — M54.41 CHRONIC MIDLINE LOW BACK PAIN WITH RIGHT-SIDED SCIATICA: ICD-10-CM

## 2021-10-04 DIAGNOSIS — M25.571 RIGHT ANKLE PAIN, UNSPECIFIED CHRONICITY: ICD-10-CM

## 2021-10-04 DIAGNOSIS — G89.29 CHRONIC MIDLINE LOW BACK PAIN WITH RIGHT-SIDED SCIATICA: ICD-10-CM

## 2021-10-04 DIAGNOSIS — M54.12 RADICULOPATHY, CERVICAL: ICD-10-CM

## 2021-10-04 PROCEDURE — 97110 THERAPEUTIC EXERCISES: CPT | Performed by: PHYSICAL THERAPIST

## 2021-10-04 PROCEDURE — 97140 MANUAL THERAPY 1/> REGIONS: CPT | Performed by: PHYSICAL THERAPIST

## 2021-10-04 NOTE — PROGRESS NOTES
Physical Therapy Daily Progress Note        Patient: Maia Hui   : 1965  Diagnosis/ICD-10 Code:  Cervical pain [M54.2]  Referring practitioner: Michael Joya MD  Date of Initial Visit: Type: THERAPY  Noted: 2021  Today's Date: 10/4/2021  Patient seen for 8 sessions             Subjective   Maia Hui reports: increased cervical and L shoulder pain at beginning of session today.    Objective   Bilateral hip abduction MMT: 4-/5  See Exercise, Manual, and Modality Logs for complete treatment.       Assessment/Plan  Patient tolerated all exercise progressions and manual therapy well today but continues to demonstrate strength/ROM deficits limiting function. Continue to progress per patient tolerance.    Progress per Plan of Care           Timed:  Manual Therapy:    15     mins  06667;  Therapeutic Exercise:    10     mins  01972;     Neuromuscular Partha:    0    mins  41052;    Therapeutic Activity:     0     mins  45634;     Gait Trainin     mins  46477;     Ultrasound:     0     mins  48303;    Electrical Stimulation:    0     mins  26521;  Iontophoresis     0     mins  65848    Untimed:  Electrical Stimulation:    0     mins  79316 ( );  Mechanical Traction:    0     mins  86341;   Fluidotherapy     0     mins  17886  Hot pack     0     Mins    Cold pack                       0     Mins     Timed Treatment:   25   mins   Total Treatment:     25   mins        Melissa Farah PT  Physical Therapist

## 2021-10-07 ENCOUNTER — TREATMENT (OUTPATIENT)
Dept: PHYSICAL THERAPY | Facility: CLINIC | Age: 56
End: 2021-10-07

## 2021-10-07 DIAGNOSIS — G89.29 CHRONIC MIDLINE LOW BACK PAIN WITH RIGHT-SIDED SCIATICA: ICD-10-CM

## 2021-10-07 DIAGNOSIS — M54.41 CHRONIC MIDLINE LOW BACK PAIN WITH RIGHT-SIDED SCIATICA: ICD-10-CM

## 2021-10-07 DIAGNOSIS — M54.12 RADICULOPATHY, CERVICAL: ICD-10-CM

## 2021-10-07 DIAGNOSIS — M25.571 RIGHT ANKLE PAIN, UNSPECIFIED CHRONICITY: ICD-10-CM

## 2021-10-07 DIAGNOSIS — M54.2 CERVICAL PAIN: Primary | ICD-10-CM

## 2021-10-07 PROCEDURE — 97110 THERAPEUTIC EXERCISES: CPT | Performed by: PHYSICAL THERAPIST

## 2021-10-07 PROCEDURE — 97140 MANUAL THERAPY 1/> REGIONS: CPT | Performed by: PHYSICAL THERAPIST

## 2021-10-07 NOTE — PROGRESS NOTES
"Physical Therapy Daily Progress Note        Patient: Maia Hui   : 1965  Diagnosis/ICD-10 Code:  Cervical pain [M54.2]  Referring practitioner: Michael Joya MD  Date of Initial Visit: Type: THERAPY  Noted: 2021  Today's Date: 10/7/2021  Patient seen for 9 sessions             Subjective   Maia Hui rates her neck and left shoulder pain at 2/10 upon arrival.   She reports having increased \"Numbness\" in right arm.  She does report that she has had numbness in her right arm for a long time, but states, \"It's gotten worse\".    Objective     Right Ankle Strength:   Dorsiflexion: 4+/5  Plantar flexion: 4+/5  Inversion: 4+/5  Eversion: 4-/5    See Exercise and Manual Logs for complete treatment.       Assessment/Plan     Pt tolerated therapy session moderately well - with progression of therapeutic exercises and Manual therapy. She has improved, but continues to have deficits in Cervical, Trunk, and Right Ankle Flexibility,  Strength, and Stability; limiting function and ability to perform ADLs at this time. She denies having any pain in right ankle/foot, but reports having persistent pain and popping in cervical region.  She has responded well to manual Therapy and soft tissue mobilization at cervical musculature - reporting decrease in pain and tightness post session, but denies any lasting relief.        Progress per Plan of Care           Timed:  Manual Therapy:    15     mins  88928;  Therapeutic Exercise:    30     mins  44460;     Neuromuscular Partha:    0    mins  77747;    Therapeutic Activity:     0     mins  51969;     Gait Trainin     mins  89493;     Ultrasound:     0     mins  71796;    Electrical Stimulation:    0     mins  49432;  Iontophoresis     0     mins  23339    Untimed:  Electrical Stimulation:    0     mins  96453 ( );  Mechanical Traction:    0     mins  18290;   Fluidotherapy     0     mins  95193  Hot pack     0     mins  38468  Cold pack     0     mins "  10737    Timed Treatment:   45   mins   Total Treatment:     45   mins        Myra Askew PTA  Physical Therapist Assistant

## 2021-10-11 ENCOUNTER — TREATMENT (OUTPATIENT)
Dept: PHYSICAL THERAPY | Facility: CLINIC | Age: 56
End: 2021-10-11

## 2021-10-11 DIAGNOSIS — M54.2 CERVICAL PAIN: Primary | ICD-10-CM

## 2021-10-11 DIAGNOSIS — G89.29 CHRONIC MIDLINE LOW BACK PAIN WITH RIGHT-SIDED SCIATICA: ICD-10-CM

## 2021-10-11 DIAGNOSIS — M25.571 RIGHT ANKLE PAIN, UNSPECIFIED CHRONICITY: ICD-10-CM

## 2021-10-11 DIAGNOSIS — M54.12 RADICULOPATHY, CERVICAL: ICD-10-CM

## 2021-10-11 DIAGNOSIS — M54.41 CHRONIC MIDLINE LOW BACK PAIN WITH RIGHT-SIDED SCIATICA: ICD-10-CM

## 2021-10-11 PROCEDURE — 97140 MANUAL THERAPY 1/> REGIONS: CPT | Performed by: PHYSICAL THERAPIST

## 2021-10-11 PROCEDURE — 97110 THERAPEUTIC EXERCISES: CPT | Performed by: PHYSICAL THERAPIST

## 2021-10-11 NOTE — PROGRESS NOTES
Physical Therapy Daily Progress Note        Patient: Maia Hui   : 1965  Diagnosis/ICD-10 Code:  Cervical pain [M54.2]  Referring practitioner: Michael Joya MD  Date of Initial Visit: No linked episodes  Today's Date: 10/11/2021  Patient seen for Visit count could not be calculated. Make sure you are using a visit which is associated with an episode. sessions             Subjective   Maia Hui reports: having increased pain and soreness in her right hip, right shoulder, and Left neck musculature.  She rates her pain at 2/10 upon arrival today.    Objective     Right Ankle Strength:   Dorsiflexion: 4+/5  Plantar flexion: 4+/5  Inversion: 4+/5  Eversion: 4-/5    See Exercise and Manual Logs for complete treatment.       Assessment/Plan     Pt tolerated therapy session moderately well - with progression of therapeutic exercises and Manual therapy. She has improved, but continues to have deficits in Cervical, Trunk, and Right Ankle Flexibility,  Strength, and Stability; limiting function and ability to perform ADLs at this time. She denies having any pain in right ankle/foot, but reports having persistent pain and popping in cervical region.  She has responded well to manual Therapy and soft tissue mobilization at cervical musculature - reporting decrease in pain and tightness post session.        Progress per Plan of Care           Timed:  Manual Therapy:    10     mins  10924;  Therapeutic Exercise:    35     mins  14653;     Neuromuscular Partha:    0    mins  36032;    Therapeutic Activity:     0     mins  33991;     Gait Trainin     mins  07532;     Ultrasound:     0     mins  07775;    Electrical Stimulation:    0     mins  40794;  Iontophoresis     0     mins  38996    Untimed:  Electrical Stimulation:    0     mins  96551 ( );  Mechanical Traction:    0     mins  13338;   Fluidotherapy     0     mins  76860  Hot pack     0     mins  70289  Cold pack     0     mins   41574    Timed Treatment:   45   mins   Total Treatment:     45   mins        Myra Askew PTA  Physical Therapist Assistant

## 2021-10-12 ENCOUNTER — OFFICE VISIT (OUTPATIENT)
Dept: SLEEP MEDICINE | Facility: HOSPITAL | Age: 56
End: 2021-10-12

## 2021-10-12 DIAGNOSIS — G47.33 OSA (OBSTRUCTIVE SLEEP APNEA): Primary | ICD-10-CM

## 2021-10-12 PROCEDURE — G0463 HOSPITAL OUTPT CLINIC VISIT: HCPCS

## 2021-10-14 ENCOUNTER — TREATMENT (OUTPATIENT)
Dept: PHYSICAL THERAPY | Facility: CLINIC | Age: 56
End: 2021-10-14

## 2021-10-14 DIAGNOSIS — M54.12 RADICULOPATHY, CERVICAL: ICD-10-CM

## 2021-10-14 DIAGNOSIS — G89.29 CHRONIC MIDLINE LOW BACK PAIN WITH RIGHT-SIDED SCIATICA: ICD-10-CM

## 2021-10-14 DIAGNOSIS — M54.2 CERVICAL PAIN: Primary | ICD-10-CM

## 2021-10-14 DIAGNOSIS — M54.41 CHRONIC MIDLINE LOW BACK PAIN WITH RIGHT-SIDED SCIATICA: ICD-10-CM

## 2021-10-14 DIAGNOSIS — M25.571 RIGHT ANKLE PAIN, UNSPECIFIED CHRONICITY: ICD-10-CM

## 2021-10-14 PROCEDURE — 97110 THERAPEUTIC EXERCISES: CPT | Performed by: PHYSICAL THERAPIST

## 2021-10-14 PROCEDURE — 97140 MANUAL THERAPY 1/> REGIONS: CPT | Performed by: PHYSICAL THERAPIST

## 2021-10-14 NOTE — PROGRESS NOTES
Physical Therapy Daily Progress Note        Patient: Maia Hui   : 1965  Diagnosis/ICD-10 Code:  Cervical pain [M54.2]  Referring practitioner: Michael Joya MD  Date of Initial Visit: Type: THERAPY  Noted: 2021  Today's Date: 10/14/2021  Patient seen for 11 sessions             Subjective   Maia Hui reports feeling pretty good today.  She rates her neck pain at 2/10 upon arrival today.  She denies having any right ankle pain.      Objective     Right Ankle Strength:   Dorsiflexion: 4+/5  Plantar flexion: 4+/5  Inversion: 4+/5  Eversion: 4-/5      See Exercise and Manual Logs for complete treatment.       Assessment/Plan     Pt tolerated therapy session well - with progression of therapeutic exercises and Manual therapy. She has improved, but continues to have deficits in Cervical, Trunk, and Right Ankle Flexibility, Strength, and Stability; limiting function and ability to perform ADLs at this time. She denies having any pain in right ankle/foot, but reports having persistent intermittent pain and popping in cervical region. She has responded well to manual Therapy and soft tissue mobilization at cervical musculature - reporting decrease in pain and tightness post session.    Progress per Plan of Care           Timed:  Manual Therapy:    10     mins  54078;  Therapeutic Exercise:    35     mins  20951;     Neuromuscular Partha:    0    mins  43555;    Therapeutic Activity:     0     mins  27480;     Gait Trainin     mins  94435;     Ultrasound:     0     mins  16212;    Electrical Stimulation:    0     mins  08246;  Iontophoresis     0     mins  20231    Untimed:  Electrical Stimulation:    0     mins  90981 ( );  Mechanical Traction:    0     mins  00331;   Fluidotherapy     0     mins  45527  Hot pack     0     mins  12065  Cold pack     0     mins  92845    Timed Treatment:   45   mins   Total Treatment:     45   mins        Myra Askew PTA  Physical Therapist  Assistant

## 2021-10-18 ENCOUNTER — TELEPHONE (OUTPATIENT)
Dept: ORTHOPEDICS | Facility: OTHER | Age: 56
End: 2021-10-18

## 2021-10-18 NOTE — TELEPHONE ENCOUNTER
PATIENT IS NOT FEELING WELL WOULD RATHER NOT RISK ANYONE. SINCE THIS IS A RE-EVAL SHE WOULD LIKE IF SOMEONE COULD CALL HER ABOUT WHAT TO DO IN ORDER TO SCHEDULE THE RE -EVAL.

## 2021-10-24 NOTE — PROGRESS NOTES
[facility]    SLEEP CLINIC FOLLOW UP PROGRESS NOTE.    Maia Hui  1965  56 y.o.  female      PCP: Josephine Torres MD      Date of visit: 10/12/2021  Patient is a 56 years old female who is returning for follow-up visit.  Reason for a follow-up visit: Narcolepsy without cataplexy and medication follow-up                                                Obstructive sleep apnea    HPI:  1.  Obstructive sleep apnea  This is a 56 y.o. years old patient who has a history of obstructive sleep apnea.  She is here for a compliance follow-up.  She has had history of obstructive sleep apnea diagnosed for several years and has been treated initially with BiPAP and later switched to CPAP after her sleep apnea improved following weight loss.  Her most recent sleep study done on 4/7/2011 showed an AHI of 6.1 events per hour total sleep time.  Total RDI was 66.2 events per hour.  She elected to continue using her CPAP machine.  She was last seen on 4/13/2021 at which time, she reported that due to sinus infections and surgeries, she has been unable to use her CPAP machine regularly.  She now  reports that using the CPAP machine causes irritation of her sinuses and throat and she wakes up feeling extremely dry in her mouth and sinuses, however, if she does not use her CPAP machine, she cannot sleep.  She is currently being considered for inspire treatment for her sleep apnea.  She reports that further sinus surgeries are  being contemplated in the future.  Endoscopy procedure for inspire therapy has been scheduled around November 22.  She normally goes to bed at 11 PM and wakes up at 8 AM getting approximately 4 to 6 hours of sleep during the night..  The patient wakes up several time(s) during the night.     2.  Narcolepsy without cataplexy  She was diagnosed at the [facility] sleep disorder center with narcolepsy without cataplexy.  She is currently taking Adderall XR 20 mg 1 in the morning  (she gets this prescription from Dr. PEPE Torres) and Adderall immediate release 10 mg 1 tablet in the afternoon (she gets this prescription from us).  She reports that as far as this is concerned the medications help control her daytime somnolence.  She has not noted any side effects from the medication.    Mediactions and allergies are reviewed by me and documented in the encounter.     SOCIAL ( habits pertaining to sleep medicine)  History of smoking:No   History of alcohol use: 0 per week  Caffeine use: 2 cups of coffee    REVIEW OF SYSTEMS:   [place] Sleepiness Scale :Total score: 8   Nsaal congestion: Yes  Dry mouth/nose: Yes  Post nasal drip; no  Acid reflux/Heartburn: No  Abd bloating: No  Morining headache: Yes  Anxiety: No  Depression: No    Physical Exam :  CONSTITUTIONAL:There were no vitals filed for this visit. There is no height or weight on file to calculate BMI.   Neck: No masses.  No carotid bruits  ENT: Mallampati class II.  Oral mucosa appears moist  RESP SYSTEM: Breath sounds are normal, no wheezes or crackles  CARDIOVASULAR: Heart rate is regular without murmur.  Neuropsych: She is alert and oriented responses are coherent and relevant.  Mood and affect appeared appropriate.    Data reviewed:  The Smart card downloaded on 10/12/2021 has been reviewed independently by me for compliance and discussed the data with the patient.   Compliance; 100%  More than 4 hr use, 100%  Average use of the device 8 hours 43-minute per night  Residual AHI: 4.4 /hr (goal < 5.0 /hr)  Mask type: Nasal pillows  DME: [employer]     ASSESSMENT AND PLAN:  · Obstructive sleep apnea ( G 47.33).  The symptoms of sleep apnea have improved with the device and the treatment.  Patient's compliance with the device is excellent for treatment of sleep apnea.  I have independently reviewed the smart card down load and discussed with the patient the download data and encouraged  the patient to continue to use the device.The residual AHI  is acceptable. The patient is also instructed to get the supplies from the [employer] and and change them on a regular basis.  A prescription for supplies has been sent to the [employer] company.  I have also discussed the good sleep hygiene habits and adequate amount of sleep needed for good health.  · Narcolepsy without cataplexy stable on medication   · Xerostomia    I suggested that the patient wear a chinstrap however, she says that she tried this before and she is not comfortable with it  I suggested decreasing her CPAP pressure to 6 cm water.  I also suggested that she may benefit from a heated tube  She is going to continue taking Adderall XR 20 mg, 1 in the morning and Adderall 10 mg on in the afternoon.  She was asked to call for her medication refills when due.  Her questions regarding inspire treatment and sleep studies were likewise addressed.  · Return in about 6 months (around 4/12/2022). . Patient's questions were answered.      Justina Sharp MD  10/24/2021

## 2021-10-27 ENCOUNTER — TREATMENT (OUTPATIENT)
Dept: PHYSICAL THERAPY | Facility: CLINIC | Age: 56
End: 2021-10-27

## 2021-10-27 DIAGNOSIS — M54.41 CHRONIC MIDLINE LOW BACK PAIN WITH RIGHT-SIDED SCIATICA: ICD-10-CM

## 2021-10-27 DIAGNOSIS — M54.12 RADICULOPATHY, CERVICAL: ICD-10-CM

## 2021-10-27 DIAGNOSIS — G89.29 CHRONIC MIDLINE LOW BACK PAIN WITH RIGHT-SIDED SCIATICA: ICD-10-CM

## 2021-10-27 DIAGNOSIS — M25.571 RIGHT ANKLE PAIN, UNSPECIFIED CHRONICITY: ICD-10-CM

## 2021-10-27 DIAGNOSIS — M54.2 CERVICAL PAIN: Primary | ICD-10-CM

## 2021-10-27 PROCEDURE — 97110 THERAPEUTIC EXERCISES: CPT | Performed by: PHYSICAL THERAPIST

## 2021-10-27 NOTE — PROGRESS NOTES
"Progress note    Patient: Maia Hui   : 1965  Diagnosis/ICD-10 Code:  Cervical pain [M54.2]  Referring practitioner: Michael Joya MD  Date of Initial Visit: Type: THERAPY  Noted: 2021  Today's Date: 10/27/2021  Patient seen for 12 sessions    Progress note due: 2021  Re-cert due: 2022      Subjective:   Maia Hui reports: that her lower back pain has not been significant the past several weeks but her cervical pain has remained unchanged since last re-eval. Patient reports that she continues to have radicular pain down the R UE to hand but this has improved some due to \"not lasting as long as it used to when it happens\".  Pt reports no pain or difficulty with the R ankle the past several weeks and states that she has been able to complete all ADLs with no ankle pain/difficulty.  Pt reports increased R anterior and lateral hip pain the last several days with no particular KAREY that worsens with hip/lumbar flexion.   Subjective Questionnaire:   WINIFRED: 22/45, LEFS: 45/80  Clinical Progress: improved  Home Program Compliance: Yes  Treatment has included: therapeutic exercise and manual therapy    Subjective   Objective          Active Range of Motion   Cervical/Thoracic Spine   Cervical    Flexion: WFL  Extension: 20 degrees with pain  Left rotation: 45 degrees with pain  Right rotation: 65 degrees     Right Ankle/Foot   Dorsiflexion (ke): 10 degrees   Plantar flexion: WFL  Inversion: 35 degrees   Eversion: 20 degrees     Additional Active Range of Motion Details  Lumbar AROM:  Flexion: 25% limited (right hip pain)  Extension: WFL (pain in lower back)  Rotation: 25% limited bilaterally  Lateral flexion: 25% limited bilaterally    Strength/Myotome Testing     Left Hip   Planes of Motion   Flexion: 4+  Extension: 4-  Abduction: 4-    Right Hip   Planes of Motion   Flexion: 4-  Extension: 4-  Abduction: 4-    Left Knee   Flexion: 4+  Extension: 4+    Right Knee   Flexion: 4+  Extension: " 4+    Right Ankle/Foot   Dorsiflexion: 5  Plantar flexion: 4+  Inversion: 5  Eversion: 5    Tests     Right Hip   Positive GEOFF and coni.       Assessment & Plan     Assessment  Impairments: abnormal gait, abnormal muscle firing, abnormal or restricted ROM, activity intolerance, impaired balance, impaired physical strength, lacks appropriate home exercise program, pain with function, safety issue and weight-bearing intolerance  Assessment details: Pt demonstrates improvements in R ankle strength, ROM but continues to demonstrate decreased bilateral hip, core, scapular strength and decreased lumbar and cervical ROM as well as reports of pain and radicular R UE pain limiting function as shown on the WINIFRED.  Pt would continue to benefit from skilled PT services in order to address remaining deficits limiting function.   Prognosis: good  Functional Limitations: lifting, sleeping, walking, pulling, pushing, uncomfortable because of pain, moving in bed, sitting, standing, stooping and unable to perform repetitive tasks  Goals  Plan Goals: 1. The patient complains of lumbar/cervcial back pain.  LTG 1: 12 weeks:  The patient will report a pain rating of 2 or better in order to improve  tolerance to activities of daily living and improve sleep quality.  STATUS:  Ongoing   STG 1a: 6 weeks:  The patient will report a pain rating of 4 or better.  STATUS:  Not met, continue  TREATMENT:  Therapeutic exercises, manual therapy, aquatic therapy, home exercise   instruction, and modalities as needed for pain to include:  electrical stimulation, moist heat, ice,   ultrasound, and diathermy.      2. The patient demonstrates weakness of the bilateral hips.  LTG 2: 12 weeks:  The patient will demonstrate 4+ /5 strength for bilateral hip flexion, abduction,  and extension in order to improve hip stability.  STATUS:  Ongoing  STG 2a: 6 weeks:  The patient will demonstrate 4 /5 strength for bilateral hip flexion, abduction,  and  extension.  STATUS:  Not met, continue  TREATMENT: Therapeutic exercises, manual therapy, aquatic therapy, home exercise instruction,  and modalities as needed for pain to include:  electrical stimulation, moist heat, ice, ultrasound, and   diathermy.      3. The patient has gait dysfunction.  LTG 3: 12 weeks:  The patient will ambulate without assistive device, independently, for   community distances with minimal limp to the R lower extremity in order to improve mobility and allow   patient to perform activities such as grocery shopping with greater ease.  STATUS:  Ongoing  TREATMENT: Gait training, aquatic therapy, therapeutic exercise, and home exercise instruction.    4. The patient has limited cervical AROM  LTG 4: 12 weeks:  The patient will demonstrate lumbar AROM as follows: 35 degrees of flexion and 35 degrees of extension, 60 degrees of rotation bilaterally.  STATUS:  Ongoing  TREATMENT: Manual therapy, therapeutic exercise, home exercise instruction, and modalities as needed to include: moist heat, electrical stimulation, and ultrasound.      4. Mobility: Walking/Moving Around Functional Limitation                   LTG 4: 12 weeks:  The patient will demonstrate 1-19 % limitation by achieving a score of 8/45 on the WINIFRED.  STATUS:  Ongoing  TREATMENT:  Manual therapy, therapeutic exercise, home exercise instruction, and modalities as needed to include: moist heat, electrical stimulation, and ultrasound.  1. The patient has limited ROM for the L ankle.              LTG 1: 8 weeks:  In order to allow the patient greater ease with forward, lateral, and diagonal mobility the patient will demonstrate improved ROM of the R ankle as follows:  10 degrees of dorsiflexion, 35 degrees of inversion, and 20 degrees of eversion.                          STATUS:  MET                TREATMENT: Manual therapy, therapeutic exercise, home exercise instruction, and modalities as needed to include:  moist heat, electrical  stimulation, ultrasound, and ice.    2. The patient has limited strength of the R ankle.              LTG 2: 8 weeks:  In order to provide greater joint stability of the R ankle the patient will demonstrate improved strength of the R ankle as follows:  5/5 dorsiflexion, 5/5 inversion, 5/5 eversion, and 5/5 plantar flexion.                          STATUS:  Partially met                                        TREATMENT: Therapeutic exercise, home exercise instruction, aquatic therapy.    3. The patient has gait dysfunction.              LTG 3: 8 weeks:  The patient will ambulate without assistive device, independently, for community distances with minimal limp to the R lower extremity in order to improve mobility and allow patient to perform activities such as grocery shopping with greater ease.                          STATUS:  ongoing                            TREATMENT: Gait training, aquatic therapy, therapeutic exercise, and home exercise instruction.    4. Mobility: Walking/Moving Around Functional Limitation                               LTG 4: 8 weeks:  The patient will demonstrate LEFS score to 58/80 to decrease limitation.                           STATUS:  Not met, continue              STG4a: 4 weeks:  The patient will demonstrate  LEFS score to 48/80 to decrease limitation.                           STATUS:  Not met, continue              TREATMENT:  Manual therapy, therapeutic exercise, home exercise instruction, and modalities as needed to include: moist heat, electrical stimulation, and ultrasound.    Plan  Therapy options: will be seen for skilled physical therapy services  Planned modality interventions: cryotherapy, electrical stimulation/Russian stimulation and TENS  Planned therapy interventions: balance/weight-bearing training, ADL retraining, soft tissue mobilization, strengthening, stretching, therapeutic activities, joint mobilization, home exercise program, gait training, functional ROM  exercises, flexibility, body mechanics training, postural training, neuromuscular re-education and manual therapy  Frequency: 2x week  Duration in weeks: 8  Treatment plan discussed with: patient      Progress toward previous goals: Partially Met        Recommendations: Continue as planned  Timeframe: 2 months  Prognosis to achieve goals: good    PT SIGNATURE: Melissa Farah PT   DATE TREATMENT INITIATED: 10/27/2021  Certification Period: 10/27/2021 - 2022      Based upon review of the patient's progress and continued therapy plan, it is my medical opinion that Maia Hui should continue physical therapy treatment at [facility]  [address] [place] [id number]  [phone].    Signature: __________________________________  Michael Joya MD    Timed:  Manual Therapy:    0     mins  00341;  Therapeutic Exercise:    30     mins  76451;     Neuromuscular Partha:    0    mins  24316;    Therapeutic Activity:     0     mins  46894;     Gait Trainin     mins  38757;     Ultrasound:     0     mins  92775;    Electrical Stimulation:    0     mins  63046 ( );    Untimed:  Electrical Stimulation:    0     mins  77427 ( );  Mechanical Traction:    0     mins  61270;     Timed Treatment:   30   mins   Total Treatment:     40   mins

## 2021-12-09 NOTE — PROGRESS NOTES
Subjective   Patient ID: Maia Hui is a 56 y.o. female is here today for a 4 month follow-up for cervical spinal stenosis.  Pt last seen on 8/11/21 and reported low back pain with R sided sciatica as well as neck pain that radiated to her arms/hands with N/T/W.  Pt was referred to pain management at the visit.  Cyclobenzaprine 10 mg tid RX.    Patient reports today neck pain that radiates to her left shoulder blade. She states her right arm goes numb when she is asleep.    I did a cervical corpectomy on this patient years ago.  She has neck pain and intermittent right arm numbness.  She has low back pain and right leg pain.  She has some foraminal stenosis in the lumbar spine and adjacent level disc bulging in the cervical spine.  Therapy seemed to help.  She wants to renew that which is reasonable.  The back and the right leg actually hurt a little bit more however and I think pain management in [place] through [facility] is a good option for her.  I do not think that she needs any surgery right now although if things worsen she might need a myelogram.  She has for some pain medications and will ask pain management to manage that but I gave her Ultram for breakthrough pain in the interim.    We will see her in 6 months.        Neck Pain   The pain is present in the midline. The quality of the pain is described as shooting. The pain is at a severity of 3/10. The symptoms are aggravated by twisting, sneezing, coughing and bending. The pain is same all the time. Associated symptoms include headaches, numbness and tingling. Pertinent negatives include no fever or trouble swallowing. She has tried muscle relaxants, NSAIDs, home exercises and heat for the symptoms. The treatment provided mild relief.       The following portions of the patient's history were reviewed and updated as appropriate: allergies, current medications, past family history, past medical history, past social history, past surgical  history and problem list.    Review of Systems   Constitutional: Negative for chills, fatigue and fever.   HENT: Negative for congestion and trouble swallowing.    Musculoskeletal: Positive for neck pain.   Neurological: Positive for tingling, numbness and headaches.   All other systems reviewed and are negative.      Objective   Physical Exam  Constitutional:       Appearance: She is well-developed.   HENT:      Head: Normocephalic and atraumatic.   Eyes:      Extraocular Movements: EOM normal.      Conjunctiva/sclera: Conjunctivae normal.      Pupils: Pupils are equal, round, and reactive to light.   Neck:      Vascular: No carotid bruit.   Neurological:      Mental Status: She is oriented to person, place, and time.      Coordination: Finger-Nose-Finger Test and Heel to Shin Test normal.      Gait: Gait is intact.      Deep Tendon Reflexes:      Reflex Scores:       Tricep reflexes are 2+ on the right side and 2+ on the left side.       Bicep reflexes are 2+ on the right side and 2+ on the left side.       Brachioradialis reflexes are 2+ on the right side and 2+ on the left side.       Patellar reflexes are 2+ on the right side and 2+ on the left side.       Achilles reflexes are 2+ on the right side and 2+ on the left side.  Psychiatric:         Speech: Speech normal.       Neurologic Exam     Mental Status   Oriented to person, place, and time.   Registration of memory: Good recent and remote memory.   Attention: normal. Concentration: normal.   Speech: speech is normal   Level of consciousness: alert  Knowledge: consistent with education.     Cranial Nerves     CN II   Visual fields full to confrontation.   Visual acuity: normal    CN III, IV, VI   Pupils are equal, round, and reactive to light.  Extraocular motions are normal.     CN V   Facial sensation intact.   Right corneal reflex: normal  Left corneal reflex: normal    CN VII   Facial expression full, symmetric.   Right facial weakness: none  Left facial  weakness: none    CN VIII   Hearing: intact    CN IX, X   Palate: symmetric    CN XI   Right sternocleidomastoid strength: normal  Left sternocleidomastoid strength: normal    CN XII   Tongue: not atrophic  Tongue deviation: none    Motor Exam   Muscle bulk: normal  Right arm tone: normal  Left arm tone: normal  Right leg tone: normal  Left leg tone: normal    Strength   Strength 5/5 except as noted.     Sensory Exam   Light touch normal.     Gait, Coordination, and Reflexes     Gait  Gait: normal    Coordination   Finger to nose coordination: normal  Heel to shin coordination: normal    Reflexes   Right brachioradialis: 2+  Left brachioradialis: 2+  Right biceps: 2+  Left biceps: 2+  Right triceps: 2+  Left triceps: 2+  Right patellar: 2+  Left patellar: 2+  Right achilles: 2+  Left achilles: 2+  Right : 2+  Left : 2+      Assessment/Plan   Independent Review of Radiographic Studies:      I reviewed the cervical thoracic and lumbar MRIs done on 3/2/2021.  She is fused from C5-C7.  There is some adjacent level disease and disc bulging at C4-C5 and C7-T1.  The thoracic MRI is unremarkable not showing any cord compression or significant disc disease.  The lumbar MRI shows multilevel degenerative disc disease and some foraminal stenosis at L3-L4, L4-L5 and L5-S1 but frankly nothing dramatic.  There is no serious or severe cord compression in the neck as well.    Medical Decision Making:      We will continue nonoperative treatment and renew her physical therapy for her neck and her low back and send her to pain management in [place] for epidural blocks in the lumbar spine and medical management.  I gave her on a one-time basis and tramadol for breakthrough pain.  We will have her come back in 6 months.  If things worsen then she will need to have myelography but as of now I do not see any surgical indications.      Diagnoses and all orders for this visit:    1. Cervical spinal stenosis (Primary)  -      Ambulatory Referral to Physical Therapy Evaluate and treat  -     Ambulatory Referral to Pain Management  -     traMADol (ULTRAM) 50 MG tablet; Take 1 tablet by mouth 2 (Two) Times a Day As Needed for Moderate Pain .  Dispense: 30 tablet; Refill: 0    2. DDD (degenerative disc disease), lumbar  -     Ambulatory Referral to Physical Therapy Evaluate and treat  -     Ambulatory Referral to Pain Management  -     traMADol (ULTRAM) 50 MG tablet; Take 1 tablet by mouth 2 (Two) Times a Day As Needed for Moderate Pain .  Dispense: 30 tablet; Refill: 0    3. History of fusion of cervical spine  -     Ambulatory Referral to Physical Therapy Evaluate and treat  -     Ambulatory Referral to Pain Management  -     traMADol (ULTRAM) 50 MG tablet; Take 1 tablet by mouth 2 (Two) Times a Day As Needed for Moderate Pain .  Dispense: 30 tablet; Refill: 0      Return in about 6 months (around 6/15/2022) for Face-to-face.

## 2021-12-15 ENCOUNTER — OFFICE VISIT (OUTPATIENT)
Dept: NEUROSURGERY | Facility: CLINIC | Age: 56
End: 2021-12-15

## 2021-12-15 VITALS
OXYGEN SATURATION: 98 % | TEMPERATURE: 97.9 F | DIASTOLIC BLOOD PRESSURE: 76 MMHG | HEIGHT: 64 IN | HEART RATE: 76 BPM | SYSTOLIC BLOOD PRESSURE: 134 MMHG | WEIGHT: 186.4 LBS | BODY MASS INDEX: 31.82 KG/M2

## 2021-12-15 DIAGNOSIS — Z98.1 HISTORY OF FUSION OF CERVICAL SPINE: ICD-10-CM

## 2021-12-15 DIAGNOSIS — M51.36 DDD (DEGENERATIVE DISC DISEASE), LUMBAR: ICD-10-CM

## 2021-12-15 DIAGNOSIS — M48.02 CERVICAL SPINAL STENOSIS: Primary | ICD-10-CM

## 2021-12-15 PROCEDURE — 99214 OFFICE O/P EST MOD 30 MIN: CPT | Performed by: NEUROLOGICAL SURGERY

## 2021-12-15 RX ORDER — TRAMADOL HYDROCHLORIDE 50 MG/1
50 TABLET ORAL 2 TIMES DAILY PRN
Qty: 30 TABLET | Refills: 0 | Status: SHIPPED | OUTPATIENT
Start: 2021-12-15

## 2021-12-16 DIAGNOSIS — G47.419 NARCOLEPSY WITHOUT CATAPLEXY: ICD-10-CM

## 2021-12-17 RX ORDER — DEXTROAMPHETAMINE SACCHARATE, AMPHETAMINE ASPARTATE, DEXTROAMPHETAMINE SULFATE AND AMPHETAMINE SULFATE 2.5; 2.5; 2.5; 2.5 MG/1; MG/1; MG/1; MG/1
TABLET ORAL
Qty: 90 TABLET | Refills: 0 | Status: SHIPPED | OUTPATIENT
Start: 2021-12-17 | End: 2022-04-12 | Stop reason: SDUPTHER

## 2022-01-11 ENCOUNTER — DOCUMENTATION (OUTPATIENT)
Dept: PHYSICAL THERAPY | Facility: CLINIC | Age: 57
End: 2022-01-11

## 2022-01-11 DIAGNOSIS — G89.29 CHRONIC MIDLINE LOW BACK PAIN WITH RIGHT-SIDED SCIATICA: ICD-10-CM

## 2022-01-11 DIAGNOSIS — M54.12 RADICULOPATHY, CERVICAL: ICD-10-CM

## 2022-01-11 DIAGNOSIS — M25.571 RIGHT ANKLE PAIN, UNSPECIFIED CHRONICITY: ICD-10-CM

## 2022-01-11 DIAGNOSIS — M54.2 CERVICAL PAIN: Primary | ICD-10-CM

## 2022-01-11 DIAGNOSIS — M54.41 CHRONIC MIDLINE LOW BACK PAIN WITH RIGHT-SIDED SCIATICA: ICD-10-CM

## 2022-01-11 NOTE — PROGRESS NOTES
Discharge Summary  Discharge Summary from Physical Therapy Report      Dates  PT visit: 9/1/21-11/8/21     Discharge Status of Patient: See MD Note dated 10/27/21    Goals: Partially Met    Discharge Plan: Continue with current home exercise program as instructed      Date of Discharge 12/1/21      Melissa Farah, PT  Physical Therapist  [place] License: [id number]

## 2022-03-22 PROBLEM — R07.2 CHEST PAIN, PRECORDIAL: Status: ACTIVE | Noted: 2022-03-22

## 2022-03-23 NOTE — PROGRESS NOTES
Chief Complaint  Chest Pain (On and off), Palpitations (New patient), and Headache      History of Present Illness  Maia Hui presents to [facility]  Last patient is a 57-year-old female who has a previous history of 2 neck surgeries and has been having some intermittent chest discomfort for the last few months constant when it comes on can last for as for days at a time pain has at times been affected by turning of her head but also had episodes that are not related to movement or position.  Patient denies any issues with worsening of shortness of breath has not had any denies any associated shortness of breath nausea or diaphoresis.  She does at times have pain that goes down her left arm.  She is also noticed some swelling as well as discoloration of her left leg over the last few months as well the patient has also had some longstanding intermittent heart palpitations which can go on for a minute when they occur.    Past Medical History:   Diagnosis Date   • Chronic EBV infection    • Fibromyalgia    • GERD (gastroesophageal reflux disease)    • Hypercholesteremia    • Hypertension    • Narcolepsy    • Sleep apnea          Current Outpatient Medications:   •  acyclovir (ZOVIRAX) 200 MG capsule, acyclovir 200 mg oral capsule take 2 capsules (400 mg) by oral route 2 times per day   Active, Disp: , Rfl:   •  ammonium lactate (LAC-HYDRIN) 12 % lotion, Apply  topically to the appropriate area as directed., Disp: , Rfl:   •  amphetamine-dextroamphetamine (Adderall) 10 MG tablet, One tab in afternoon 90 day supply, Disp: 90 tablet, Rfl: 0  •  amphetamine-dextroamphetamine XR (ADDERALL XR) 20 MG 24 hr capsule, Take  by mouth., Disp: , Rfl:   •  Biotin 1000 MCG tablet, Take 2 tablets by mouth., Disp: , Rfl:   •  cyclobenzaprine (FLEXERIL) 10 MG tablet, Take 1 tablet by mouth 3 (Three) Times a Day As Needed for Muscle Spasms., Disp: 90 tablet, Rfl: 3  •  EVENING PRIMROSE OIL PO, Take   "by mouth Daily., Disp: , Rfl:   •  HYDROcodone-acetaminophen (NORCO) 7.5-325 MG per tablet, Take 1 tablet by mouth every 6 (six) hours as needed for moderate pain (4-6)., Disp: , Rfl:   •  Lactobacillus Acidophilus powder, lactobacillus acidophilus oral take 1 tablet by oral route BID   Active, Disp: , Rfl:   •  loratadine (CLARITIN) 10 MG tablet, Take  by mouth., Disp: , Rfl:   •  Narcan 4 MG/0.1ML nasal spray, , Disp: , Rfl:   •  Probiotic Product (PROBIOTIC DAILY PO), Take  by mouth., Disp: , Rfl:   •  traMADol (ULTRAM) 50 MG tablet, Take 1 tablet by mouth 2 (Two) Times a Day As Needed for Moderate Pain ., Disp: 30 tablet, Rfl: 0    Medications Discontinued During This Encounter   Medication Reason   • Evening Primrose topical oil Duplicate order   • loratadine (CLARITIN) 10 MG tablet Duplicate order   • multivitamin (THERAGRAN) tablet tablet *Therapy completed   • Omega-3 Fatty Acids (fish oil) 1000 MG capsule capsule *Therapy completed     Allergies   Allergen Reactions   • Molds & Smuts Dermatitis   • Morphine And Related Unknown - High Severity   • Sulfamethoxazole-Trimethoprim Other (See Comments)     Crews-Buzz reaction     • Pravastatin Other (See Comments)     pain in hands and feet     • Elavil [Amitriptyline] Anxiety        Social History     Tobacco Use   • Smoking status: Former Smoker     Packs/day: 0.50     Years: 15.00     Pack years: 7.50     Types: Cigarettes   • Smokeless tobacco: Never Used   Vaping Use   • Vaping Use: Never used   Substance Use Topics   • Alcohol use: No   • Drug use: Never       Family History   Problem Relation Age of Onset   • Hypertension Mother    • Hyperlipidemia Mother    • Heart disease Mother    • Heart disease Father    • Hypertension Father    • Heart disease Brother    Brother with CABG at 55  Mom with stent in her in 80s    Objective     /67 (BP Location: Left arm, Patient Position: Sitting)   Pulse 67   Ht 162.6 cm (64\")   Wt 84.8 kg (187 lb)   BMI " 32.10 kg/m²       Physical Exam    General Appearance:   · no acute distress  · Alert and oriented x3  HENT:   · lips not cyanotic  · Atraumatic  Neck:  · No jvd   · supple  Respiratory:  · no respiratory distress  · normal breath sounds  · no rales  Cardiovascular:  · Regular rate and rhythm  · no S3, no S4   · no murmur  · no rub  Extremities  · No cyanosis  · lower extremity edema: none    Skin:   · warm, dry  · No rashes    Result Review :     No results found for: PROBNP    cxr 12/20  Creatinine 0.77  Potassium 4.1  AST 30  ALT 29  Total cholesterol 258  Triglycerides 148  HDL 70     CONCLUSION:     1. No acute pulmonary process.      ECG 12 Lead    Date/Time: 3/24/2022 9:46 AM  Performed by: Mick Castano MD  Authorized by: Mick Castano MD   Comparison: not compared with previous ECG   Rhythm: sinus rhythm  Comments: Left axis deviation  Abnormal R wave progression           No results found for this or any previous visit.             The ASCVD Risk score (Quyen ROSSY Jr., et al., 2013) failed to calculate for the following reasons:    Cannot find a previous HDL lab    Cannot find a previous total cholesterol lab     Diagnoses and all orders for this visit:    1. Chest pain, precordial (Primary)  Assessment & Plan:  Patient with episode of chest discomfort with respecters of dyslipidemia and family history symptoms do sound atypical certainly muscle skeletal etiology would be in the differential but would recommend nonevasive stress echocardiogram as well to evaluate for the possibility of ischemic heart disease as well    Orders:  -     ECG 12 Lead  -     Stress Test With Myocardial Perfusion One Day; Future  -     Adult Transthoracic Echo Complete W/ Cont if Necessary Per Protocol; Future  -     Holter Monitor - 24 Hour; Future    2. Palpitations  Assessment & Plan:  Most likely benign PVCs will recommend 24-hour Holter monitor and suggested a trial of over-the-counter magnesium 500 mg daily to see if  this improves symptoms    Orders:  -     Holter Monitor - 24 Hour; Future    3. Dyslipidemia          Follow Up     No follow-ups on file.          Patient was given instructions and counseling regarding her condition or for health maintenance advice. Please see specific information pulled into the AVS if appropriate.

## 2022-03-24 ENCOUNTER — OFFICE VISIT (OUTPATIENT)
Dept: CARDIOLOGY | Facility: CLINIC | Age: 57
End: 2022-03-24

## 2022-03-24 VITALS
DIASTOLIC BLOOD PRESSURE: 67 MMHG | WEIGHT: 187 LBS | SYSTOLIC BLOOD PRESSURE: 114 MMHG | BODY MASS INDEX: 31.92 KG/M2 | HEIGHT: 64 IN | HEART RATE: 67 BPM

## 2022-03-24 DIAGNOSIS — E78.5 DYSLIPIDEMIA: ICD-10-CM

## 2022-03-24 DIAGNOSIS — R00.2 PALPITATIONS: ICD-10-CM

## 2022-03-24 DIAGNOSIS — R07.2 CHEST PAIN, PRECORDIAL: Primary | ICD-10-CM

## 2022-03-24 PROCEDURE — 93000 ELECTROCARDIOGRAM COMPLETE: CPT | Performed by: INTERNAL MEDICINE

## 2022-03-24 PROCEDURE — 99204 OFFICE O/P NEW MOD 45 MIN: CPT | Performed by: INTERNAL MEDICINE

## 2022-03-24 NOTE — ASSESSMENT & PLAN NOTE
Patient with episode of chest discomfort with respecters of dyslipidemia and family history symptoms do sound atypical certainly muscle skeletal etiology would be in the differential but would recommend nonevasive stress echocardiogram as well to evaluate for the possibility of ischemic heart disease as well

## 2022-03-24 NOTE — PATIENT INSTRUCTIONS
Cholesterol Content in Foods  Cholesterol is a waxy, fat-like substance that helps to carry fat in the blood. The body needs cholesterol in small amounts, but too much cholesterol can cause damage to the arteries and heart.  Most people should eat less than 200 milligrams (mg) of cholesterol a day.  Foods with cholesterol    Cholesterol is found in animal-based foods, such as meat, seafood, and dairy. Generally, low-fat dairy and lean meats have less cholesterol than full-fat dairy and fatty meats. The milligrams of cholesterol per serving (mg per serving) of common cholesterol-containing foods are listed below.  Meat and other proteins  Egg -- one large whole egg has 186 mg.  Veal shank -- 4 oz has 141 mg.  Lean ground turkey (93% lean) -- 4 oz has 118 mg.  Fat-trimmed lamb loin -- 4 oz has 106 mg.  Lean ground beef (90% lean) -- 4 oz has 100 mg.  Lobster -- 3.5 oz has 90 mg.  Pork loin chops -- 4 oz has 86 mg.  Canned salmon -- 3.5 oz has 83 mg.  Fat-trimmed beef top loin -- 4 oz has 78 mg.  Frankfurter -- 1 yarelis (3.5 oz) has 77 mg.  Crab -- 3.5 oz has 71 mg.  Roasted chicken without skin, white meat -- 4 oz has 66 mg.  Light bologna -- 2 oz has 45 mg.  Deli-cut turkey -- 2 oz has 31 mg.  Canned tuna -- 3.5 oz has 31 mg.  Peralta -- 1 oz has 29 mg.  Oysters and mussels (raw) -- 3.5 oz has 25 mg.  Mackerel -- 1 oz has 22 mg.  Trout -- 1 oz has 20 mg.  Pork sausage -- 1 link (1 oz) has 17 mg.  [place] -- 1 oz has 16 mg.  Tilapia -- 1 oz has 14 mg.  Dairy  Soft-serve ice cream -- ½ cup (4 oz) has 103 mg.  Whole-milk yogurt -- 1 cup (8 oz) has 29 mg.  Cheddar cheese -- 1 oz has 28 mg.  American cheese -- 1 oz has 28 mg.  Whole milk -- 1 cup (8 oz) has 23 mg.  2% milk -- 1 cup (8 oz) has 18 mg.  Cream cheese -- 1 tablespoon (Tbsp) has 15 mg.  Cottage cheese -- ½ cup (4 oz) has 14 mg.  Low-fat (1%) milk -- 1 cup (8 oz) has 10 mg.  Sour cream -- 1 Tbsp has 8.5 mg.  Low-fat yogurt -- 1 cup (8 oz) has 8 mg.  Nonfat Greek  yogurt -- 1 cup (8 oz) has 7 mg.  Half-and-half cream -- 1 Tbsp has 5 mg.  Fats and oils  Cod liver oil -- 1 tablespoon (Tbsp) has 82 mg.  Butter -- 1 Tbsp has 15 mg.  Lard -- 1 Tbsp has 14 mg.  Peralta grease -- 1 Tbsp has 14 mg.  Mayonnaise -- 1 Tbsp has 5-10 mg.  Margarine -- 1 Tbsp has 3-10 mg.  Exact amounts of cholesterol in these foods may vary depending on specific ingredients and brands.  Foods without cholesterol  Most plant-based foods do not have cholesterol unless you combine them with a food that has cholesterol. Foods without cholesterol include:  Grains and cereals.  Vegetables.  Fruits.  Vegetable oils, such as olive, canola, and sunflower oil.  Legumes, such as peas, beans, and lentils.  Nuts and seeds.  Egg whites.  Summary  The body needs cholesterol in small amounts, but too much cholesterol can cause damage to the arteries and heart.  Most people should eat less than 200 milligrams (mg) of cholesterol a day.  This information is not intended to replace advice given to you by your health care provider. Make sure you discuss any questions you have with your health care provider.  Document Revised: 05/10/2021 Document Reviewed: 05/10/2021  [employer] Patient Education © 2021 [employer]

## 2022-03-24 NOTE — ASSESSMENT & PLAN NOTE
Most likely benign PVCs will recommend 24-hour Holter monitor and suggested a trial of over-the-counter magnesium 500 mg daily to see if this improves symptoms

## 2022-04-12 ENCOUNTER — OFFICE VISIT (OUTPATIENT)
Dept: SLEEP MEDICINE | Facility: HOSPITAL | Age: 57
End: 2022-04-12

## 2022-04-12 VITALS
BODY MASS INDEX: 31.76 KG/M2 | HEIGHT: 64 IN | SYSTOLIC BLOOD PRESSURE: 114 MMHG | WEIGHT: 186 LBS | HEART RATE: 76 BPM | DIASTOLIC BLOOD PRESSURE: 76 MMHG | OXYGEN SATURATION: 99 % | TEMPERATURE: 97.4 F

## 2022-04-12 DIAGNOSIS — G47.33 OSA (OBSTRUCTIVE SLEEP APNEA): Primary | ICD-10-CM

## 2022-04-12 DIAGNOSIS — G47.419 NARCOLEPSY WITHOUT CATAPLEXY: ICD-10-CM

## 2022-04-12 PROCEDURE — G0463 HOSPITAL OUTPT CLINIC VISIT: HCPCS | Performed by: PSYCHIATRY & NEUROLOGY

## 2022-04-12 RX ORDER — DEXTROAMPHETAMINE SACCHARATE, AMPHETAMINE ASPARTATE, DEXTROAMPHETAMINE SULFATE AND AMPHETAMINE SULFATE 2.5; 2.5; 2.5; 2.5 MG/1; MG/1; MG/1; MG/1
TABLET ORAL
Qty: 90 TABLET | Refills: 0 | Status: SHIPPED | OUTPATIENT
Start: 2022-04-12 | End: 2022-07-12 | Stop reason: SDUPTHER

## 2022-04-20 ENCOUNTER — HOSPITAL ENCOUNTER (OUTPATIENT)
Dept: NUCLEAR MEDICINE | Facility: HOSPITAL | Age: 57
Discharge: HOME | End: 2022-04-20

## 2022-04-20 DIAGNOSIS — R07.2 CHEST PAIN, PRECORDIAL: ICD-10-CM

## 2022-04-20 PROCEDURE — 93016 CV STRESS TEST SUPVJ ONLY: CPT | Performed by: INTERNAL MEDICINE

## 2022-04-20 PROCEDURE — 78452 HT MUSCLE IMAGE SPECT MULT: CPT | Performed by: INTERNAL MEDICINE

## 2022-04-20 PROCEDURE — 0 TECHNETIUM TETROFOSMIN KIT: Performed by: INTERNAL MEDICINE

## 2022-04-20 PROCEDURE — 93017 CV STRESS TEST TRACING ONLY: CPT

## 2022-04-20 PROCEDURE — 93018 CV STRESS TEST I&R ONLY: CPT | Performed by: INTERNAL MEDICINE

## 2022-04-20 PROCEDURE — A9502 TC99M TETROFOSMIN: HCPCS | Performed by: INTERNAL MEDICINE

## 2022-04-20 PROCEDURE — 25010000002 REGADENOSON 0.4 MG/5ML SOLUTION: Performed by: INTERNAL MEDICINE

## 2022-04-20 PROCEDURE — 78452 HT MUSCLE IMAGE SPECT MULT: CPT

## 2022-04-20 RX ADMIN — TETROFOSMIN 1 DOSE: 1.38 INJECTION, POWDER, LYOPHILIZED, FOR SOLUTION INTRAVENOUS at 13:01

## 2022-04-20 RX ADMIN — REGADENOSON 0.4 MG: 0.08 INJECTION, SOLUTION INTRAVENOUS at 13:01

## 2022-04-20 RX ADMIN — TETROFOSMIN 1 DOSE: 1.38 INJECTION, POWDER, LYOPHILIZED, FOR SOLUTION INTRAVENOUS at 12:00

## 2022-04-22 LAB
BH CV IMMEDIATE POST RECOVERY TECH DATA SYMPTOMS: NORMAL
BH CV IMMEDIATE POST TECH DATA BLOOD PRESSURE: NORMAL MMHG
BH CV IMMEDIATE POST TECH DATA HEART RATE: 80 BPM
BH CV IMMEDIATE POST TECH DATA OXYGEN SATS: 98 %
BH CV REST NUCLEAR ISOTOPE DOSE: 9.8 MCI
BH CV SIX MINUTE RECOVERY TECH DATA BLOOD PRESSURE: NORMAL
BH CV SIX MINUTE RECOVERY TECH DATA HEART RATE: 71 BPM
BH CV SIX MINUTE RECOVERY TECH DATA OXYGEN SATURATION: 97 %
BH CV STRESS BP STAGE 1: NORMAL
BH CV STRESS COMMENTS STAGE 1: NORMAL
BH CV STRESS DOSE REGADENOSON STAGE 1: 0.4
BH CV STRESS DURATION MIN STAGE 1: 0
BH CV STRESS DURATION SEC STAGE 1: 10
BH CV STRESS HR STAGE 1: 59
BH CV STRESS NUCLEAR ISOTOPE DOSE: 36.3 MCI
BH CV STRESS O2 STAGE 1: 98
BH CV STRESS PROTOCOL 1: NORMAL
BH CV STRESS RECOVERY BP: NORMAL MMHG
BH CV STRESS RECOVERY HR: 71 BPM
BH CV STRESS RECOVERY O2: 97 %
BH CV STRESS STAGE 1: 1
BH CV THREE MINUTE POST TECH DATA BLOOD PRESSURE: NORMAL MMHG
BH CV THREE MINUTE POST TECH DATA HEART RATE: 75 BPM
BH CV THREE MINUTE POST TECH DATA OXYGEN SATURATION: 98 %
LV EF NUC BP: 68 %
MAXIMAL PREDICTED HEART RATE: 161 BPM
PERCENT MAX PREDICTED HR: 50.93 %
STRESS BASELINE BP: NORMAL MMHG
STRESS BASELINE HR: 58 BPM
STRESS O2 SAT REST: 94 %
STRESS PERCENT HR: 60 %
STRESS POST O2 SAT PEAK: 98 %
STRESS POST PEAK BP: NORMAL MMHG
STRESS POST PEAK HR: 82 BPM
STRESS TARGET HR: 137 BPM

## 2022-05-13 ENCOUNTER — OFFICE VISIT (OUTPATIENT)
Dept: ORTHOPEDIC SURGERY | Facility: CLINIC | Age: 57
End: 2022-05-13

## 2022-05-13 VITALS — OXYGEN SATURATION: 96 % | WEIGHT: 189.4 LBS | HEIGHT: 64 IN | BODY MASS INDEX: 32.33 KG/M2 | HEART RATE: 84 BPM

## 2022-05-13 DIAGNOSIS — M25.572 LEFT ANKLE PAIN, UNSPECIFIED CHRONICITY: Primary | ICD-10-CM

## 2022-05-13 DIAGNOSIS — M25.372 LEFT ANKLE INSTABILITY: ICD-10-CM

## 2022-05-13 PROCEDURE — 99203 OFFICE O/P NEW LOW 30 MIN: CPT | Performed by: ORTHOPAEDIC SURGERY

## 2022-05-13 RX ORDER — ERGOCALCIFEROL 1.25 MG/1
CAPSULE ORAL
COMMUNITY
Start: 2022-03-10

## 2022-05-13 RX ORDER — MONTELUKAST SODIUM 10 MG/1
TABLET ORAL
COMMUNITY

## 2022-05-13 RX ORDER — L. ACIDOPHILUS/PECTIN, CITRUS 25MM-100MG
TABLET ORAL
COMMUNITY
Start: 2022-03-23

## 2022-05-13 RX ORDER — GLUCOSAMINE/D3/BOSWELLIA SERRA 1500MG-400
TABLET ORAL
COMMUNITY
Start: 2022-04-19

## 2022-05-13 RX ORDER — LOSARTAN POTASSIUM AND HYDROCHLOROTHIAZIDE 12.5; 1 MG/1; MG/1
TABLET ORAL
COMMUNITY
Start: 2022-02-22

## 2022-05-13 NOTE — PROGRESS NOTES
"Chief Complaint  Initial Evaluation of the Left Ankle     Subjective      Maia Hui presents to [facility] for evaluation of the left ankle. The patient reports she has a weak ankle. She has had previous sprains and avulsion fractures to the ankle. She reports she had an incident of instability to the left ankle. The patient reports she wore a brace to the ankle. She reports swelling and pain to the ankle. She has tried physical therapy without relief. She reports her left knee is still numb since a fall a couple months ago.     Allergies   Allergen Reactions   • Molds & Smuts Dermatitis   • Morphine And Related Unknown - High Severity   • Sulfamethoxazole-Trimethoprim Other (See Comments)     Crews-Buzz reaction     • Pravastatin Other (See Comments)     pain in hands and feet     • Elavil [Amitriptyline] Anxiety        Social History     Socioeconomic History   • Marital status:    • Number of children: 1   • Years of education: College Graduate   Tobacco Use   • Smoking status: Former Smoker     Packs/day: 0.50     Years: 15.00     Pack years: 7.50     Types: Cigarettes   • Smokeless tobacco: Never Used   Vaping Use   • Vaping Use: Never used   Substance and Sexual Activity   • Alcohol use: No   • Drug use: Never   • Sexual activity: Defer        Review of Systems     Objective   Vital Signs:   Pulse 84   Ht 162.6 cm (64\")   Wt 85.9 kg (189 lb 6.4 oz)   SpO2 96%   BMI 32.51 kg/m²       Physical Exam  Constitutional:       Appearance: Normal appearance. The patient is well-developed and normal weight.   HENT:      Head: Normocephalic.      Right Ear: Hearing and external ear normal.      Left Ear: Hearing and external ear normal.      Nose: Nose normal.   Eyes:      Conjunctiva/sclera: Conjunctivae normal.   Cardiovascular:      Rate and Rhythm: Normal rate.   Pulmonary:      Effort: Pulmonary effort is normal.      Breath sounds: No wheezing or rales. "   Abdominal:      Palpations: Abdomen is soft.      Tenderness: There is no abdominal tenderness.   Musculoskeletal:      Cervical back: Normal range of motion.   Skin:     Findings: No rash.   Neurological:      Mental Status: The patient is alert and oriented to person, place, and time.   Psychiatric:         Mood and Affect: Mood and affect normal.         Judgment: Judgment normal.       Ortho Exam      Left ankle- Tender to the lateral ankle. Mild tenderness to distal fibula. Non-tender to the achilles. Dorsiflexion 10. Plantar flexion 55. 1+ laxity to anterior drawer, mild laxity to talar tilt. Mild decreased sensation to the toes. Good capillary refill. Decreased sensation to the thumb. Positive crepitus. Neurovascularly intact. Full knee ROM.     Procedures    X-Ray Report:  Left ankle(s) X-Ray  Indication: Evaluation of left ankle pain  AP and Lateral view(s)  Findings: moderate degenerative changes  With osteophytes the anterior and posterior tibia as well as the anterior talus. No avulsion fracture. Joint space preserved.   Prior studies available for comparison: no         Imaging Results (Most Recent)     Procedure Component Value Units Date/Time    XR Ankle 2 View Left [[id number]] Resulted: 05/13/22 0958     Updated: 05/13/22 1000           Result Review :       Stress Test With Myocardial Perfusion One Day    Result Date: 4/22/2022  Narrative: · Left ventricular ejection fraction is normal. (Calculated EF = 68%). · Myocardial perfusion imaging indicates a normal myocardial perfusion study with no evidence of ischemia. · Impressions are consistent with a low risk study.               Assessment and Plan     Diagnoses and all orders for this visit:    1. Left ankle pain, unspecified chronicity (Primary)  -     XR Ankle 2 View Left        Discussed the treatment plan with the patient.  Plan for MRI of the left ankle to evaluate for internal derangement. Plan to continue ankle brace at this time. I  advised her that her knee having numbness is normal after a direct fall.     Call or return if worsening symptoms.    Follow Up     After MRI      Patient was given instructions and counseling regarding her condition or for health maintenance advice. Please see specific information pulled into the AVS if appropriate.     Scribed for Mick Herrera MD by Colette Whiting.  05/13/22   10:15 EDT    I have personally performed the services described in this document as scribed by the above individual and it is both accurate and complete. Mick Herrera MD 05/15/22

## 2022-05-21 NOTE — PROGRESS NOTES
[facility]    SLEEP CLINIC FOLLOW UP PROGRESS NOTE.    Maia Hui  1965  57 y.o.  female      PCP: Josephine Torres MD      Date of visit: 4/12/2022  Patient is returning for follow-up visit.  Reason for follow-up visit: Obstructive sleep apnea and narcolepsy without cataplexy.    HPI:  1.  Obstructive Sleep Apnea  This is a 57 y.o. years old patient who has a history of obstructive sleep apnea of several years duration.  She is here for   compliance follow-up.  The patient used to have severe sleep apnea however, following weight loss, her sleep apnea improved.  Her most recent sleep study done on 4/7/2011 showed an AHI of 6.1 events per hour total sleep time.  Total RDI was 66.2 events per hour.  She has been treated with CPAP and is compliant with CPAP use however, she does have difficulties with dryness of her mouth and throat.  Sleep apnea is mild in severity with a AHI of 6.1/hr. During her last clinic visit, the patient expressed an interest in pursuing inspire therapy (hypoglossal nerve stimulator) for DILCIA however, apparently, she is not a candidate for this procedure.  In addition, she is also not a candidate for oral appliance therapy.  She is going to continue CPAP for treatment of her DILCIA.  Normally goes to bed at around 11 PM and wakes up at 8 AM getting approximately 3 to 6 hours of sleep during the night.  The patient wakes up 5-8 time(s) during the night and has no problem going back to sleep.  The patient reports that she wakes up for different reasons such as pain, headaches, nightmares although these have decreased recently.  She has no difficulty going back to sleep after her awakenings.  Feels refreshed after waking up on most days..  Patient also denies headaches    2.  Narcolepsy without Cataplexy.    She was also diagnosed years ago with narcolepsy without cataplexy at the [facility] sleep disorder center.  She is currently being treated with Adderall XR  "20 mg 1 capsule in the morning (she gets this prescription from Dr. PEPE Torres).  She gets Adderall immediate release 10 mg 1 tablet in the afternoon (she gets this prescription from us).  She reports that the medications have helped her stay awake during the day.  She denies having any side effects from the medications.  Van Buren score is 8/24    Mediactions and allergies are reviewed by me and documented in the encounter.     SOCIAL ( habits pertaining to sleep medicine)  History of smoking:No   History of alcohol use: 0 per week  Caffeine use: 1 cup/day    REVIEW OF SYSTEMS:   Van Buren Sleepiness Scale :Total score: 8   Nsaal congestion: No  Dry mouth/nose: Yes  Post nasal drip; yes  Acid reflux/Heartburn: No  Abd bloating: No  Morining headache: Yes  Anxiety: No  Depression: No    Physical Exam :  CONSTITUTIONAL:  Vitals:    04/12/22 1300   BP: 114/76   Pulse: 76   Temp: 97.4 °F (36.3 °C)   SpO2: 99%   Weight: 84.4 kg (186 lb)   Height: 162.6 cm (64\")    Body mass index is 31.93 kg/m².   Neck: No carotid bruits  RESP SYSTEM: Breath sounds are normal, no wheezes or crackles  CARDIOVASULAR: Heart rate is regular without murmur. No edema  Neuropsych: She is awake alert and oriented.  Responses are coherent and relevant.  Mood and affect appeared appropriate.      Data reviewed:  The Smart card downloaded on 4/12/2022 has been reviewed independently by me for compliance and discussed the data with the patient.   Compliance; 97%  More than 4 hr use, 97%  Average use of the device 8 hours 28 minutes per night  Residual AHI: 7.1 /hr (goal < 5.0 /hr)  Mask type: Nasal pillows  DME: [employer]     ASSESSMENT AND PLAN:  · Obstructive sleep apnea ( G 47.33).  The symptoms of sleep apnea have improved with the device and the treatment.  Patient's compliance with the device is excellent for treatment of sleep apnea.  I have independently reviewed the smart card down load and discussed with the patient the download data and " encouraged  the patient to continue to use the device.The residual AHI is acceptable. The patient is also instructed to get the supplies from the DME company and and change them on a regular basis.  A prescription for supplies has been sent to the [employer] company.  I have also discussed the good sleep hygiene habits and adequate amount of sleep needed for good health.  · Mask fitting with our sleep educator    Narcolepsy without Cataplexy, stable on medications  Continue Adderall XR 20 mg 1 capsule in the morning  Continue Adderall 10 mg 1 tablet in the afternoon.  · Return in about 6 months (around 10/12/2022). . Patient's questions were answered.      Justina Sharp MD  5/20/2022

## 2022-06-13 ENCOUNTER — HOSPITAL ENCOUNTER (OUTPATIENT)
Dept: MRI IMAGING | Facility: HOSPITAL | Age: 57
Discharge: HOME | End: 2022-06-13
Admitting: ORTHOPAEDIC SURGERY

## 2022-06-13 DIAGNOSIS — M25.572 LEFT ANKLE PAIN, UNSPECIFIED CHRONICITY: ICD-10-CM

## 2022-06-13 DIAGNOSIS — M25.372 LEFT ANKLE INSTABILITY: ICD-10-CM

## 2022-06-13 PROCEDURE — 73721 MRI JNT OF LWR EXTRE W/O DYE: CPT

## 2022-06-15 ENCOUNTER — OFFICE VISIT (OUTPATIENT)
Dept: NEUROSURGERY | Facility: CLINIC | Age: 57
End: 2022-06-15

## 2022-06-15 VITALS
DIASTOLIC BLOOD PRESSURE: 80 MMHG | OXYGEN SATURATION: 98 % | HEART RATE: 67 BPM | BODY MASS INDEX: 32.37 KG/M2 | SYSTOLIC BLOOD PRESSURE: 140 MMHG | WEIGHT: 189.6 LBS | HEIGHT: 64 IN | TEMPERATURE: 97.8 F

## 2022-06-15 DIAGNOSIS — Z98.1 HISTORY OF FUSION OF CERVICAL SPINE: ICD-10-CM

## 2022-06-15 DIAGNOSIS — M51.36 DDD (DEGENERATIVE DISC DISEASE), LUMBAR: ICD-10-CM

## 2022-06-15 DIAGNOSIS — M48.02 CERVICAL SPINAL STENOSIS: Primary | ICD-10-CM

## 2022-06-15 PROCEDURE — 99214 OFFICE O/P EST MOD 30 MIN: CPT | Performed by: NEUROLOGICAL SURGERY

## 2022-06-15 NOTE — PROGRESS NOTES
Subjective   Patient ID: Maia Hui is a 57 y.o. female is here today for follow-up for neck pain. She has history of cervical corpectomy. She was las seen on 12/15/21 with complaints of neck pain with right arm numbness. She was to go back to physical therapy and pain management.     Ms. Hui reports her low back pain is better as she has been working with pain management on this. She reports worsening neck pain with arm pain. She also has numbness and tingling of her arms. She also reports pain in her chest that radiates into her shoulder blade. She states physical therapy did help.      I did an ACDF at C5-C6 and C6-C7 a few years ago.  Last time I saw her she was having some back and leg pain from essentially nonoperative disc disease.  She was sent to [facility] pain management she has had injections and finish out the therapy and it helped her back and her leg.  She has been having little bit more neck and arm symptoms probably from the adjacent level disease at C4-C5 and C7-T1.  She asked if cervical epidural blocks would be reasonable I think that they are and so we will send her back to her pain management group in [place] to do that.  They take care of her medicines.  I asked her about renewing physical therapy but she is unable to do so right now because of a lot of family issues so I told her to do her exercises on her own.  Would like to avoid surgery in the neck if possible.  There is not anything operative in the low back at the current time.        Neck Pain   This is a chronic problem. The current episode started more than 1 month ago. The problem occurs constantly. The problem has been gradually worsening. The pain is present in the left side. The quality of the pain is described as aching. The pain is at a severity of 3/10. The pain is mild. Associated symptoms include numbness and tingling. Pertinent negatives include no fever or leg pain. She has tried NSAIDs for the symptoms.  "The treatment provided mild relief.       The following portions of the patient's history were reviewed and updated as appropriate: allergies, current medications, past family history, past medical history, past social history, past surgical history and problem list.    Review of Systems   Constitutional: Negative for activity change and fever.   Musculoskeletal: Positive for neck pain.   Neurological: Positive for tingling and numbness.   Psychiatric/Behavioral: Negative for sleep disturbance.   All other systems reviewed and are negative.          Objective     Vitals:    06/15/22 1244   BP: 140/80   Pulse: 67   Temp: 97.8 °F (36.6 °C)   SpO2: 98%   Weight: 86 kg (189 lb 9.5 oz)   Height: 162.6 cm (64\")     Body mass index is 32.54 kg/m².      Physical Exam  Constitutional:       Appearance: She is well-developed.   HENT:      Head: Normocephalic and atraumatic.   Eyes:      Extraocular Movements: EOM normal.      Conjunctiva/sclera: Conjunctivae normal.      Pupils: Pupils are equal, round, and reactive to light.   Neck:      Vascular: No carotid bruit.   Neurological:      Mental Status: She is oriented to person, place, and time.      Coordination: Finger-Nose-Finger Test and Heel to Shin Test normal.      Gait: Gait is intact.      Deep Tendon Reflexes:      Reflex Scores:       Tricep reflexes are 2+ on the right side and 2+ on the left side.       Bicep reflexes are 2+ on the right side and 2+ on the left side.       Brachioradialis reflexes are 2+ on the right side and 2+ on the left side.       Patellar reflexes are 2+ on the right side and 2+ on the left side.       Achilles reflexes are 2+ on the right side and 2+ on the left side.  Psychiatric:         Speech: Speech normal.       Neurologic Exam     Mental Status   Oriented to person, place, and time.   Registration of memory: Good recent and remote memory.   Attention: normal. Concentration: normal.   Speech: speech is normal   Level of consciousness: " alert  Knowledge: consistent with education.     Cranial Nerves     CN II   Visual fields full to confrontation.   Visual acuity: normal    CN III, IV, VI   Pupils are equal, round, and reactive to light.  Extraocular motions are normal.     CN V   Facial sensation intact.   Right corneal reflex: normal  Left corneal reflex: normal    CN VII   Facial expression full, symmetric.   Right facial weakness: none  Left facial weakness: none    CN VIII   Hearing: intact    CN IX, X   Palate: symmetric    CN XI   Right sternocleidomastoid strength: normal  Left sternocleidomastoid strength: normal    CN XII   Tongue: not atrophic  Tongue deviation: none    Motor Exam   Muscle bulk: normal  Right arm tone: normal  Left arm tone: normal  Right leg tone: normal  Left leg tone: normal    Strength   Strength 5/5 except as noted.     Sensory Exam   Light touch normal.     Gait, Coordination, and Reflexes     Gait  Gait: normal    Coordination   Finger to nose coordination: normal  Heel to shin coordination: normal    Reflexes   Right brachioradialis: 2+  Left brachioradialis: 2+  Right biceps: 2+  Left biceps: 2+  Right triceps: 2+  Left triceps: 2+  Right patellar: 2+  Left patellar: 2+  Right achilles: 2+  Left achilles: 2+  Right : 2+  Left : 2+          Assessment & Plan   Independent Review of Radiographic Studies:      I personally reviewed the images from the following studies.    I reviewed the cervical thoracic and lumbar MRIs done on 3/2/2021.  She is fused from C5-C7.  There is some adjacent level disease and disc bulging at C4-C5 and C7-T1.  The thoracic MRI is unremarkable not showing any cord compression or significant disc disease.  The lumbar MRI shows multilevel degenerative disc disease and some foraminal stenosis at L3-L4, L4-L5 and L5-S1 but frankly nothing dramatic.  There is no serious or severe cord compression in the neck as well.    Medical Decision Making:      The low back pain seems to be  better.  We will assess the pain doctors at [facility] to do a series of cervical epidural blocks.  She will continue her own home exercises.  I am not opposed to an ablation approach if they feel that that is necessary.  We will follow her have her come back in 6 months.      Diagnoses and all orders for this visit:    1. Cervical spinal stenosis (Primary)  -     Ambulatory Referral to Pain Management    2. DDD (degenerative disc disease), lumbar    3. History of fusion of cervical spine  -     Ambulatory Referral to Pain Management      Return in about 6 months (around 12/15/2022) for Face-to-face.

## 2022-06-21 ENCOUNTER — OFFICE VISIT (OUTPATIENT)
Dept: ORTHOPEDIC SURGERY | Facility: CLINIC | Age: 57
End: 2022-06-21

## 2022-06-21 VITALS — HEART RATE: 98 BPM | OXYGEN SATURATION: 98 % | HEIGHT: 64 IN | BODY MASS INDEX: 31.92 KG/M2 | WEIGHT: 187 LBS

## 2022-06-21 DIAGNOSIS — S82.892A CLOSED AVULSION FRACTURE OF LEFT ANKLE, INITIAL ENCOUNTER: Primary | ICD-10-CM

## 2022-06-21 DIAGNOSIS — S90.02XA CONTUSION OF LEFT ANKLE, INITIAL ENCOUNTER: ICD-10-CM

## 2022-06-21 DIAGNOSIS — S93.402A SPRAIN OF LEFT ANKLE, UNSPECIFIED LIGAMENT, INITIAL ENCOUNTER: ICD-10-CM

## 2022-06-21 PROCEDURE — 99213 OFFICE O/P EST LOW 20 MIN: CPT | Performed by: ORTHOPAEDIC SURGERY

## 2022-06-21 NOTE — PROGRESS NOTES
"Chief Complaint  Pain and Follow-up of the Left Ankle     Subjective      Maia Hui presents to [facility] for follow up evaluation of the left ankle. She recently had an MRI and is here today for those results. She has been wearing an ankle brace. To review, The patient reports she has a weak ankle. She has had previous sprains and avulsion fractures to the ankle. She reports she had an incident of instability to the left ankle. The patient reports she wore a brace to the ankle. She reports swelling and pain to the ankle. She has tried physical therapy without relief. She reports her left knee is still numb since a fall a couple months ago.     Allergies   Allergen Reactions   • Molds & Smuts Dermatitis   • Morphine And Related Unknown - High Severity   • Sulfamethoxazole-Trimethoprim Other (See Comments)     Crews-Buzz reaction     • Pravastatin Other (See Comments)     pain in hands and feet     • Elavil [Amitriptyline] Anxiety        Social History     Socioeconomic History   • Marital status:    • Number of children: 1   • Years of education: College Graduate   Tobacco Use   • Smoking status: Former Smoker     Packs/day: 0.50     Years: 15.00     Pack years: 7.50     Types: Cigarettes   • Smokeless tobacco: Never Used   Vaping Use   • Vaping Use: Never used   Substance and Sexual Activity   • Alcohol use: No   • Drug use: Never   • Sexual activity: Defer        Review of Systems     Objective   Vital Signs:   Pulse 98   Ht 162.6 cm (64\")   Wt 84.8 kg (187 lb)   SpO2 98%   BMI 32.10 kg/m²       Physical Exam  Constitutional:       Appearance: Normal appearance. The patient is well-developed and normal weight.   HENT:      Head: Normocephalic.      Right Ear: Hearing and external ear normal.      Left Ear: Hearing and external ear normal.      Nose: Nose normal.   Eyes:      Conjunctiva/sclera: Conjunctivae normal.   Cardiovascular:      Rate and Rhythm: Normal " rate.   Pulmonary:      Effort: Pulmonary effort is normal.      Breath sounds: No wheezing or rales.   Abdominal:      Palpations: Abdomen is soft.      Tenderness: There is no abdominal tenderness.   Musculoskeletal:      Cervical back: Normal range of motion.   Skin:     Findings: No rash.   Neurological:      Mental Status: The patient is alert and oriented to person, place, and time.   Psychiatric:         Mood and Affect: Mood and affect normal.         Judgment: Judgment normal.       Ortho Exam      Left ankle- non-tender. Dorsiflexion 5. Plantar flexion 50. Mild swelling. Neurovascularly intact. Positive EHL, FHL, GS and TA. Sensation intact to all 5 nerves of the foot. Positive pulses. Pain with talar tilt. Neurovascularly intact. Good capillary refill. Intact ankle plantar flexion and dorsiflexion. Achilles intact.     Procedures    Imaging Results (Most Recent)     None           Result Review :       MRI Ankle Left Without Contrast    Result Date: 6/13/2022  Narrative: PROCEDURE: MRI ANKLE LEFT  WO CONTRAST  COMPARISON: [facility] , CR, XR ANKLE 2 VW LEFT, 5/13/2022, 10:03.  INDICATIONS: LEFT ANKLE INSTABILITY, LATERAL PAIN AND SWELLING SINCE ROLLING ANKLE     TECHNIQUE: A complete multi-planar examination was performed without contrast.  FINDINGS:  T2 high signal consistent with edema is noted in the medial and lateral malleoli.  Inferior to the lateral malleolus are 2 os ossific densities measuring 0.5 cm and 0.4 cm.  These are suspected to represent acute fracture fragments.  Osseous structures otherwise appear unremarkable.  Small amounts of fluid are noted surrounding the peroneus longus and peroneus brevis consistent with tenosynovitis.  The tendons themselves appear intact visualized tendons otherwise appear unremarkable.  The anterior talofibular ligament is torn.  The calcaneofibular ligament demonstrates intermediate signal consistent with a sprain.  The other ankle ligaments appear  intact.  Moderate tibiotalar and small subtalar joint effusions are noted.  Cartilage in these joints is intact.  The sinus tarsi appears normal.  The plantar fascia appears normal.      Impression:   1. Two small suspected acute avulsion fracture fragments from the inferior tip of the lateral malleolus 2. Contusion in the medial malleolus peroneus longus and brevis tenosynovitis 3. Tear of the anterior talofibular ligament 4. Sprain of the calcaneofibular ligament 5. Moderate tibiotalar and small subtalar joint effusions     Edward Mckeon M.D.       Electronically Signed and Approved By: Edward Mckeon M.D. on 6/13/2022 at 15:29                      Assessment and Plan     Diagnoses and all orders for this visit:    1. Closed avulsion fracture of left ankle, initial encounter (Primary)    2. Sprain of left ankle, unspecified ligament, initial encounter    3. Contusion of left ankle, initial encounter        Discussed the treatment plan with the patient.  I reviewed the MRI with the patient. Plan to continue ankle brace as needed. Home exercises given today.     Call or return if worsening symptoms.    Follow Up     6 weeks. No x-ray needed.       Patient was given instructions and counseling regarding her condition or for health maintenance advice. Please see specific information pulled into the AVS if appropriate.     Scribed for Mick Herrera MD by Colette Whiting.  06/21/22   09:20 EDT    I have personally performed the services described in this document as scribed by the above individual and it is both accurate and complete. Mick Herrera MD 06/21/22

## 2022-06-23 ENCOUNTER — TELEPHONE (OUTPATIENT)
Dept: NEUROSURGERY | Facility: CLINIC | Age: 57
End: 2022-06-23

## 2022-06-23 NOTE — TELEPHONE ENCOUNTER
Patient had a L[facility]DEN[facility] She reports that she had intense bilateral leg pain and a fever on and off last night. She did not sleep on at all. She states that she has not experienced pain like that before. She reports today that her back is tender which is expected. She denies any leg pain whatsoever. I advised the patient that it could take a few days to see if this helps with her pain. I told her that it was not unusual that she experienced this pain depending on the nerve that was involved. She requested that a message be sent to Dr. Joya

## 2022-06-28 NOTE — TELEPHONE ENCOUNTER
I called and spoke with the patient and she states that she is doing better. She states that the pain she did experience lasted only through the night. She states that she did have some symptoms of flushing of the skin, fever and nausea that last about two days. She states she is better now.

## 2022-07-12 DIAGNOSIS — G47.419 NARCOLEPSY WITHOUT CATAPLEXY: ICD-10-CM

## 2022-07-12 RX ORDER — DEXTROAMPHETAMINE SACCHARATE, AMPHETAMINE ASPARTATE, DEXTROAMPHETAMINE SULFATE AND AMPHETAMINE SULFATE 2.5; 2.5; 2.5; 2.5 MG/1; MG/1; MG/1; MG/1
TABLET ORAL
Qty: 90 TABLET | Refills: 0 | Status: SHIPPED | OUTPATIENT
Start: 2022-07-12 | End: 2022-10-11 | Stop reason: SDUPTHER

## 2022-08-02 ENCOUNTER — OFFICE VISIT (OUTPATIENT)
Dept: ORTHOPEDIC SURGERY | Facility: CLINIC | Age: 57
End: 2022-08-02

## 2022-08-02 VITALS — HEIGHT: 64 IN | HEART RATE: 80 BPM | BODY MASS INDEX: 31.76 KG/M2 | WEIGHT: 186 LBS | OXYGEN SATURATION: 100 %

## 2022-08-02 DIAGNOSIS — S93.402D SPRAIN OF LEFT ANKLE, UNSPECIFIED LIGAMENT, SUBSEQUENT ENCOUNTER: ICD-10-CM

## 2022-08-02 DIAGNOSIS — S82.892D CLOSED AVULSION FRACTURE OF LEFT ANKLE WITH ROUTINE HEALING, SUBSEQUENT ENCOUNTER: Primary | ICD-10-CM

## 2022-08-02 DIAGNOSIS — S90.02XD CONTUSION OF LEFT ANKLE, SUBSEQUENT ENCOUNTER: ICD-10-CM

## 2022-08-02 PROCEDURE — 99213 OFFICE O/P EST LOW 20 MIN: CPT | Performed by: PHYSICIAN ASSISTANT

## 2022-08-02 NOTE — PROGRESS NOTES
Chief Complaint  Pain and Follow-up of the Left Ankle    Subjective          Maia Hui is a 57 y.o. female  presents to [facility] for   History of Present Illness      Patient presents for follow-up evaluation of left ankle pain, left ankle instability.  She was seen by Dr. Herrera on 6/21/2022 he reviewed x-ray and MRI with her, she states that they discussed conservative treatment she was given an ankle brace and home exercises since last visit.  Patient states that her home exercises have helped some but she still has pain with certain movements and she does feel like the ankle is unstable.  She states she has a history of right ankle surgeries she states Dr. Ge in [place] had taken care of her in the past for her right ankle issues she states the left ankle issues are similar to what the right is and she would like referral to Dr. Ge for further evaluation.      Allergies   Allergen Reactions   • Molds & Smuts Dermatitis   • Morphine And Related Unknown - High Severity   • Sulfamethoxazole-Trimethoprim Other (See Comments)     Crews-Buzz reaction     • Pravastatin Other (See Comments)     pain in hands and feet     • Elavil [Amitriptyline] Anxiety        Social History     Socioeconomic History   • Marital status:    • Number of children: 1   • Years of education: College Graduate   Tobacco Use   • Smoking status: Former Smoker     Packs/day: 0.50     Years: 15.00     Pack years: 7.50     Types: Cigarettes   • Smokeless tobacco: Never Used   Vaping Use   • Vaping Use: Never used   Substance and Sexual Activity   • Alcohol use: No   • Drug use: Never   • Sexual activity: Defer        REVIEW OF SYSTEMS    Constitutional: Denies fevers, chills, weight loss  Cardiovascular: Denies chest pain, shortness of breath  Skin: Denies rashes, acute skin changes  Neurologic: Denies headache, loss of consciousness  MSK: Left ankle pain      Objective  "  Vital Signs:   Pulse 80   Ht 162.6 cm (64\")   Wt 84.4 kg (186 lb)   SpO2 100%   BMI 31.93 kg/m²     Body mass index is 31.93 kg/m².    Physical Exam    Left ankle: Nontender to palpation, dorsiflexion 5, plantarflexion 50, mild swelling, neurovascular intact, sensation intact light touch, 3+ dorsalis pedis/posterior tibialis pulses, capillary fill less than 3 seconds, pain with talar tilt,, intact ankle plantarflexion, dorsiflexion, Achilles intact.    Procedures    Imaging Results (Most Recent)     None         No results found.    Result Review :   The following data was reviewed by: DEVIN Boo on 08/02/2022:               Assessment and Plan    Diagnoses and all orders for this visit:    1. Closed avulsion fracture of left ankle with routine healing, subsequent encounter (Primary)  -     Ambulatory Referral to Orthopedic Surgery    2. Sprain of left ankle, unspecified ligament, subsequent encounter  -     Ambulatory Referral to Orthopedic Surgery    3. Contusion of left ankle, subsequent encounter  -     Ambulatory Referral to Orthopedic Surgery        Discussed diagnosis and treatment options with the patient she would like to see Dr. Ge since he has history of helping her with her right ankle she was given referral for this follow-up with our office as needed.  Patient agreed.    Call or return if worsening symptoms.    Follow Up   Return if symptoms worsen or fail to improve.  Patient was given instructions and counseling regarding her condition or for health maintenance advice. Please see specific information pulled into the AVS if appropriate.       "

## 2022-08-08 ENCOUNTER — TRANSCRIBE ORDERS (OUTPATIENT)
Dept: ADMINISTRATIVE | Facility: HOSPITAL | Age: 57
End: 2022-08-08

## 2022-08-08 DIAGNOSIS — E55.9 VITAMIN D DEFICIENCY: Primary | ICD-10-CM

## 2022-08-08 DIAGNOSIS — S82.892A ANKLE FRACTURE, LEFT, CLOSED, INITIAL ENCOUNTER: ICD-10-CM

## 2022-08-15 ENCOUNTER — HOSPITAL ENCOUNTER (OUTPATIENT)
Dept: BONE DENSITY | Facility: HOSPITAL | Age: 57
Discharge: HOME | End: 2022-08-15
Admitting: INTERNAL MEDICINE

## 2022-08-15 DIAGNOSIS — S82.892A ANKLE FRACTURE, LEFT, CLOSED, INITIAL ENCOUNTER: ICD-10-CM

## 2022-08-15 DIAGNOSIS — E55.9 VITAMIN D DEFICIENCY: ICD-10-CM

## 2022-08-15 PROCEDURE — 77080 DXA BONE DENSITY AXIAL: CPT

## 2022-09-30 ENCOUNTER — TRANSCRIBE ORDERS (OUTPATIENT)
Dept: PHYSICAL THERAPY | Facility: CLINIC | Age: 57
End: 2022-09-30

## 2022-09-30 ENCOUNTER — TREATMENT (OUTPATIENT)
Dept: PHYSICAL THERAPY | Facility: CLINIC | Age: 57
End: 2022-09-30

## 2022-09-30 DIAGNOSIS — M25.372 LEFT ANKLE INSTABILITY: Primary | ICD-10-CM

## 2022-09-30 DIAGNOSIS — S93.402D SPRAIN OF LEFT ANKLE, UNSPECIFIED LIGAMENT, SUBSEQUENT ENCOUNTER: ICD-10-CM

## 2022-09-30 DIAGNOSIS — M25.372 LEFT ANKLE INSTABILITY: ICD-10-CM

## 2022-09-30 DIAGNOSIS — S93.402A SPRAIN OF LEFT ANKLE, UNSPECIFIED LIGAMENT, INITIAL ENCOUNTER: Primary | ICD-10-CM

## 2022-09-30 PROCEDURE — 97110 THERAPEUTIC EXERCISES: CPT | Performed by: PHYSICAL THERAPIST

## 2022-09-30 PROCEDURE — 97161 PT EVAL LOW COMPLEX 20 MIN: CPT | Performed by: PHYSICAL THERAPIST

## 2022-09-30 NOTE — PROGRESS NOTES
Physical Therapy Initial Evaluation and Plan of Care      Patient: Maia Hui   : 1965  Diagnosis/ICD-10 Code:  Sprain of left ankle, unspecified ligament, initial encounter [S93.402A]  Referring practitioner: EJ Rothman MD  Date of Initial Visit: 2022  Today's Date: 2022  Patient seen for 1 sessions           Subjective Questionnaire: LEFS: 52/80=35% limitation      Subjective Evaluation    History of Present Illness  Mechanism of injury: Patient is a 57 yr old female referred to physical therapy secondary to left lateral instability/sprain.  Patient sustained sprain with small avulsion fxs in May 2020.  Patient states left ankle swelling and discomfort with activity. Patient reports has had surgeries on right ankle secondary to instability.  Patient reports left ankle discomfort wakes her up at night.   Patient states that she has been performing exercises to help strengthen left ankle.     Pain  Current pain ratin  At worst pain ratin  Location: left ankle lateral-posterior  Quality: dull ache and sharp  Relieving factors: change in position, rest and medications    Patient Goals  Patient goals for therapy: increased strength, independence with ADLs/IADLs and increased motion             Objective          Active Range of Motion     Right Ankle/Foot   Normal active range of motion  Eversion: 12 degrees     Strength/Myotome Testing     Right Ankle/Foot   Dorsiflexion: 4+  Plantar flexion: 5  Inversion: 4+  Eversion: 4    Swelling     Right Ankle/Foot   Malleoli: 27 cm          Assessment & Plan     Assessment  Impairments: impaired physical strength and lacks appropriate home exercise program  Functional Limitations: walking and unable to perform repetitive tasks  Assessment details: Pt presents with limitations, noted by evaluation that impede patient's ability to walk/functional mobility.  The skills of a therapist will be required to safely and effectively implement the  following treatment plan to restore maximal level of function.    Prognosis: good    Goals  Plan Goals: 1. Mobility: Walking/Moving Around Functional Limitation     LTG 1: 8 weeks:  The patient will demonstrate 12% limitation by achieving a score of 70/80 on the Lower Extremity Functional Scale.   STATUS:  New   STG 1a: 4 weeks:  The patient will demonstrate 25% limitation by achieving a score of 60/80 on the Lower Extremity Functional Scale.     STATUS:  New      2. The patient has limited strength of the left ankle.   LTG 2: 8 weeks:  In order to provide greater joint stability of the left ankle the patient will demonstrate improved strength of the left ankle as follows:  5/5 dorsiflexion, 5/5 inversion, 5/5 eversion, and 5/5 plantar flexion.   STATUS:  New   STG 2a: 4 weeks:  The patient will demonstrate independence and compliance with home exercise program.   STATUS:  New     3. The patient complains of left ankle pain.   LTG 3: 8 weeks:  The patient will report a pain rating of 1/10 or better in order to improve tolerance to activities of daily living and improve sleep quality.   STATUS:  New   STG 4a: 4 weeks:  The patient will report a pain rating of 3/10 or better.   STATUS:  New       Plan  Therapy options: will be seen for skilled therapy services  Planned therapy interventions: home exercise program, strengthening, neuromuscular re-education and balance/weight-bearing training  Frequency: 2x week  Duration in weeks: 8  Treatment plan discussed with: patient  Plan details: Patient going to continue with  Home exercises and follow up with therapy in a few weeks.       History # of Personal Factors and/or Comorbidities: LOW (0)  Examination of Body System(s): # of elements: LOW (1-2)  Clinical Presentation: STABLE   Clinical Decision Making: LOW       Visit Diagnoses:    ICD-10-CM ICD-9-CM   1. Sprain of left ankle, unspecified ligament, initial encounter  S93.402A 845.00   2. Left ankle instability   M25.372 718.87       Timed:  Manual Therapy:         mins  00416;  Therapeutic Exercise:    11     mins  84268;     Neuromuscular Partha:        mins  12564;    Therapeutic Activity:          mins  81978;     Gait Training:           mins  74514;     Ultrasound:          mins  53599;    Electrical Stimulation:         mins  73118 ( );    Untimed:  Electrical Stimulation:         mins  88399 ( );  Mechanical Traction:         mins  21490;    PT evaluation     Low Eval                        30   Mins  25353  Mod Eval                             Mins  07045  High Eval                           Mins  77776    Timed Treatment:   11   mins   Total Treatment:     41   mins    PT SIGNATURE: Kamala Patel, PT     Electronically singed 9/30/2022    [place] PT license: [id number]        Initial Certification  Certification Period: 9/30/2022 thru 12/28/2022  I certify that the therapy services are furnished while this patient is under my care.  The services outlined above are required by this patient, and will be reviewed every 90 days.    PHYSICIAN: EJ Rothman MD  NPI: [id number]                                      DATE:     Please sign and return via fax to [phone]  Thank you, [facility].

## 2022-10-11 ENCOUNTER — OFFICE VISIT (OUTPATIENT)
Dept: SLEEP MEDICINE | Facility: HOSPITAL | Age: 57
End: 2022-10-11

## 2022-10-11 VITALS — HEIGHT: 64 IN | OXYGEN SATURATION: 100 % | WEIGHT: 185.2 LBS | HEART RATE: 50 BPM | BODY MASS INDEX: 31.62 KG/M2

## 2022-10-11 DIAGNOSIS — G47.33 OSA (OBSTRUCTIVE SLEEP APNEA): Primary | ICD-10-CM

## 2022-10-11 DIAGNOSIS — G47.419 NARCOLEPSY WITHOUT CATAPLEXY: ICD-10-CM

## 2022-10-11 PROCEDURE — 99204 OFFICE O/P NEW MOD 45 MIN: CPT | Performed by: FAMILY MEDICINE

## 2022-10-11 PROCEDURE — G0463 HOSPITAL OUTPT CLINIC VISIT: HCPCS | Performed by: FAMILY MEDICINE

## 2022-10-11 RX ORDER — DEXTROAMPHETAMINE SACCHARATE, AMPHETAMINE ASPARTATE, DEXTROAMPHETAMINE SULFATE AND AMPHETAMINE SULFATE 2.5; 2.5; 2.5; 2.5 MG/1; MG/1; MG/1; MG/1
TABLET ORAL
Qty: 90 TABLET | Refills: 0 | Status: SHIPPED | OUTPATIENT
Start: 2022-10-11 | End: 2023-01-13 | Stop reason: SDUPTHER

## 2022-10-11 NOTE — PROGRESS NOTES
Follow Up Sleep Disorders Center Note     Chief Complaint:  DILCIA and narcolepsy    Primary Care Physician: Josephine Torres MD    Maia Hui is a 57 y.o.female  was last seen at [facility] sleep lab: 4/12/2022.  New patient to me.  Presents today for follow-up of DILCIA.  Reviewed results of sleep study from 2011 which showed overall AHI 6.1.  RDI 66.2.  Was being treated with CPAP compliant with use however having difficulty with dryness of mouth and throat.  Not a candidate for inspire therapy.  Also not a candidate for oral mandibular device.  Patient was also diagnosed with narcolepsy without cataplexy; this was done at the [facility] sleep disorder center.  At last visit being treated with Adderall XR 20 mg in the morning and 10 mg immediate release in the afternoon.  Extended release prescription comes from Dr. Torres and immediate release tablet prescribed by [facility].  In last visit advised to continue with CPAP and Adderall as dosed.  Sleep education visit was done in April 2022 as well.  At this visit mask fitting was done.  Patient was advised to switch her machine to a lower location so level with head change to nasal mask.  Today patient reports bedtime 11 PM wake time 8 AM.  Reports vivid dreaming and increased sleepiness.  ESS of 5.    Today reports mask does not fit well and leaks air.  She wants to change DME to [facility].    Results Review:  DME is [facility].  Downloads between 7/13/2022 - 10/10/2022.  Average usage is 8 hours 23 minutes.  Average AHI is 6.3.  Average AutoCPAP pressure is 7 cm H2O significant air leak noted.    Current Medications:    Current Outpatient Medications:   •  acyclovir (ZOVIRAX) 200 MG capsule, acyclovir 200 mg oral capsule take 2 capsules (400 mg) by oral route 2 times per day   Active, Disp: , Rfl:   •  ammonium lactate (LAC-HYDRIN) 12 % lotion, Apply  topically to the appropriate area as directed., Disp: , Rfl:   •  amphetamine-dextroamphetamine  (Adderall) 10 MG tablet, One tab in afternoon 90 day supply, Disp: 90 tablet, Rfl: 0  •  amphetamine-dextroamphetamine XR (ADDERALL XR) 20 MG 24 hr capsule, Take  by mouth., Disp: , Rfl:   •  Biotin 60947 MCG tablet, , Disp: , Rfl:   •  cyclobenzaprine (FLEXERIL) 10 MG tablet, Take 1 tablet by mouth 3 (Three) Times a Day As Needed for Muscle Spasms., Disp: 90 tablet, Rfl: 3  •  EVENING PRIMROSE OIL PO, Take  by mouth Daily., Disp: , Rfl:   •  HYDROcodone-acetaminophen (NORCO) 7.5-325 MG per tablet, Take 1 tablet by mouth every 6 (six) hours as needed for moderate pain (4-6)., Disp: , Rfl:   •  Lactobacillus Acid-Pectin (Acidophilus/Citrus Pectin) tablet tablet, , Disp: , Rfl:   •  loratadine (CLARITIN) 10 MG tablet, Take  by mouth., Disp: , Rfl:   •  losartan-hydrochlorothiazide (HYZAAR) 100-12.5 MG per tablet, , Disp: , Rfl:   •  montelukast (SINGULAIR) 10 MG tablet, montelukast 10 mg tablet, Disp: , Rfl:   •  Narcan 4 MG/0.1ML nasal spray, , Disp: , Rfl:   •  traMADol (ULTRAM) 50 MG tablet, Take 1 tablet by mouth 2 (Two) Times a Day As Needed for Moderate Pain ., Disp: 30 tablet, Rfl: 0  •  vitamin D (ERGOCALCIFEROL) 1.25 MG (32032 UT) capsule capsule, , Disp: , Rfl:    also entered in Sleep Questionnaire    Patient  has a past medical history of Chronic EBV infection, Fibromyalgia, GERD (gastroesophageal reflux disease), Hypercholesteremia, Hypertension, Narcolepsy, and Sleep apnea.    Social History:    Social History     Socioeconomic History   • Marital status:    • Number of children: 1   • Years of education: College Graduate   Tobacco Use   • Smoking status: Former     Packs/day: 0.50     Years: 15.00     Pack years: 7.50     Types: Cigarettes   • Smokeless tobacco: Never   Vaping Use   • Vaping Use: Never used   Substance and Sexual Activity   • Alcohol use: No   • Drug use: Never   • Sexual activity: Defer       Allergies:  Molds & smuts, Morphine and related, Sulfamethoxazole-trimethoprim,  "Pravastatin, and Elavil [amitriptyline]    Vital Signs:    Vitals:    10/11/22 1300   Pulse: 50   SpO2: 100%   Weight: 84 kg (185 lb 3.2 oz)   Height: 162.6 cm (64.02\")     Body mass index is 31.77 kg/m².    REVIEW OF SYSTEMS.  Full review of systems available on the intake form which is scanned in the media tab.  The relevant positive are noted below  1. Daytime excessive sleepiness with [place] Sleepiness Scale :Total score: 6   2. Snoring  3. Nasal congestion dry mouth postnasal drip morning headache      Physical exam:  Vitals:    10/11/22 1300   Pulse: 50   SpO2: 100%   Weight: 84 kg (185 lb 3.2 oz)   Height: 162.6 cm (64.02\")    Body mass index is 31.77 kg/m².    HEENT: Head is atraumatic, normocephalic  Eyes: pupils are round equal and reacting to light and accommodation, conjunctiva normal  RESPIRATORY SYSTEM: Regular respirations  CARDIOVASULAR SYSTEM: Regular rate  EXTREMITES: No cyanosis, clubbing  NEUROLOGICAL SYSTEM: Oriented x 3, no gross motor defects, gait normal    Impression:  1. DILCIA (obstructive sleep apnea)    2. Narcolepsy without cataplexy        Obstructive sleep apnea adequately treated with CPAP 7 cm H2O with good compliance and usage and continued complaints of hypersomnolence.  Continue Adderall as dosed 20 mg XR every morning prescribed by PCP and 10 mg immediate release q. afternoon prescribed by us.  Prescription given today; will call in 3 months for refill.  Return to clinic in 6 months for follow-up or sooner if needed.  Change DME to [facility]; patient will contact DME about mask fitting.  Due for new machine.  Machine is over 5 years old.  Order placed for new machine.    Patient uses the CPAP device and benefits from its use in terms of reduction of hypersomnia and snoring.Weight loss will be strongly beneficial to reduce the severity of sleep-disordered breathing.  Caution during activities that require prolonged concentration is strongly advised if sleepiness returns. Changing of " PAP supplies regularly is important for effective use. Patient needs to change cushion on the mask or plugs on nasal pillows along with disposable filters once every month and change mask frame, tubing, headgear and Velcro straps every 6 months at the minimum.    Jens Nelson MD  Sleep Medicine  10/11/22  13:38 EDT

## 2022-10-19 ENCOUNTER — TREATMENT (OUTPATIENT)
Dept: PHYSICAL THERAPY | Facility: CLINIC | Age: 57
End: 2022-10-19

## 2022-10-19 DIAGNOSIS — S93.402A SPRAIN OF LEFT ANKLE, UNSPECIFIED LIGAMENT, INITIAL ENCOUNTER: Primary | ICD-10-CM

## 2022-10-19 DIAGNOSIS — M25.372 LEFT ANKLE INSTABILITY: ICD-10-CM

## 2022-10-19 PROCEDURE — 97110 THERAPEUTIC EXERCISES: CPT | Performed by: PHYSICAL THERAPIST

## 2022-10-19 PROCEDURE — 97530 THERAPEUTIC ACTIVITIES: CPT | Performed by: PHYSICAL THERAPIST

## 2022-10-19 NOTE — PROGRESS NOTES
Physical Therapy Daily Treatment Note      Patient: Maia Hui   : 1965  Referring practitioner: EJ Rothman MD  Date of Initial Visit: Type: THERAPY  Noted: 2022  Today's Date: 10/19/2022  Patient seen for 2 sessions           Subjective   Maia Hui reports: reports left ankle pain 2/10      Objective   See Exercise, Manual, and Modality Logs for complete treatment.       Assessment & Plan     Assessment  Prognosis details: Patient tolerated progression of left ankle strengthening; progressed home exercise program.  Patient states she will continue with home exercises and follow up with MD 3/31/22 and will return if MD requests        Visit Diagnoses:    ICD-10-CM ICD-9-CM   1. Sprain of left ankle, unspecified ligament, initial encounter  S93.402A 845.00   2. Left ankle instability  M25.372 718.87       Awaiting MD orders           Timed:  Manual Therapy:         mins  66168;  Therapeutic Exercise:    16     mins  00112;     Neuromuscular Partha:        mins  95286;    Therapeutic Activity:      10    mins  52612;     Gait Training:           mins  71972;     Ultrasound:          mins  90017;    Electrical Stimulation:         mins  41025 ( );    Untimed:  Electrical Stimulation:         mins  73717 ( );  Mechanical Traction:         mins  11752;     Timed Treatment:   26   mins   Total Treatment:     26   mins  Kamala Patel PT    Electronically singed 10/19/2022      [place] PT license: [id number]  Physical Therapist

## 2022-11-18 ENCOUNTER — TELEPHONE (OUTPATIENT)
Dept: SLEEP MEDICINE | Facility: HOSPITAL | Age: 57
End: 2022-11-18

## 2022-11-18 NOTE — TELEPHONE ENCOUNTER
Call from pt re her studies at [facility].  She was told that we needed to send signed copies?  Call to [facility] (Charla) and they stated that they have everything that they need.  They will take care of pt.  I advised pt to call back in a week if she doesn't hear from them.

## 2022-12-06 ENCOUNTER — DOCUMENTATION (OUTPATIENT)
Dept: PHYSICAL THERAPY | Facility: CLINIC | Age: 57
End: 2022-12-06

## 2022-12-19 ENCOUNTER — TRANSCRIBE ORDERS (OUTPATIENT)
Dept: ADMINISTRATIVE | Facility: HOSPITAL | Age: 57
End: 2022-12-19

## 2022-12-19 DIAGNOSIS — Z12.31 SCREENING MAMMOGRAM FOR BREAST CANCER: Primary | ICD-10-CM

## 2023-01-10 DIAGNOSIS — G47.419 NARCOLEPSY WITHOUT CATAPLEXY: ICD-10-CM

## 2023-01-13 DIAGNOSIS — G47.419 NARCOLEPSY WITHOUT CATAPLEXY: ICD-10-CM

## 2023-01-13 RX ORDER — DEXTROAMPHETAMINE SACCHARATE, AMPHETAMINE ASPARTATE, DEXTROAMPHETAMINE SULFATE AND AMPHETAMINE SULFATE 2.5; 2.5; 2.5; 2.5 MG/1; MG/1; MG/1; MG/1
TABLET ORAL
Qty: 90 TABLET | Refills: 0 | OUTPATIENT
Start: 2023-01-13

## 2023-01-13 RX ORDER — DEXTROAMPHETAMINE SACCHARATE, AMPHETAMINE ASPARTATE, DEXTROAMPHETAMINE SULFATE AND AMPHETAMINE SULFATE 2.5; 2.5; 2.5; 2.5 MG/1; MG/1; MG/1; MG/1
TABLET ORAL
Qty: 90 TABLET | Refills: 0 | OUTPATIENT
Start: 2023-01-13 | End: 2023-01-13 | Stop reason: SDUPTHER

## 2023-01-30 ENCOUNTER — TRANSCRIBE ORDERS (OUTPATIENT)
Dept: ADMINISTRATIVE | Facility: HOSPITAL | Age: 58
End: 2023-01-30
Payer: MEDICARE

## 2023-01-30 DIAGNOSIS — R09.81 NASAL CONGESTION: Primary | ICD-10-CM

## 2023-01-30 PROCEDURE — 88305 TISSUE EXAM BY PATHOLOGIST: CPT | Performed by: OTOLARYNGOLOGY

## 2023-01-31 ENCOUNTER — LAB REQUISITION (OUTPATIENT)
Dept: LAB | Facility: HOSPITAL | Age: 58
End: 2023-01-31
Payer: MEDICARE

## 2023-01-31 DIAGNOSIS — R22.0 LOCALIZED SWELLING, MASS AND LUMP, HEAD: ICD-10-CM

## 2023-02-01 LAB
CYTO UR: NORMAL
LAB AP CASE REPORT: NORMAL
LAB AP CLINICAL INFORMATION: NORMAL
PATH REPORT.FINAL DX SPEC: NORMAL
PATH REPORT.GROSS SPEC: NORMAL

## 2023-02-22 ENCOUNTER — HOSPITAL ENCOUNTER (OUTPATIENT)
Dept: CT IMAGING | Facility: HOSPITAL | Age: 58
Discharge: HOME | End: 2023-02-22
Admitting: OTOLARYNGOLOGY
Payer: MEDICARE

## 2023-02-22 DIAGNOSIS — R09.81 NASAL CONGESTION: ICD-10-CM

## 2023-02-22 PROCEDURE — 70486 CT MAXILLOFACIAL W/O DYE: CPT

## 2023-03-10 ENCOUNTER — HOSPITAL ENCOUNTER (OUTPATIENT)
Dept: MAMMOGRAPHY | Facility: HOSPITAL | Age: 58
Discharge: HOME | End: 2023-03-10
Admitting: INTERNAL MEDICINE
Payer: MEDICARE

## 2023-03-10 DIAGNOSIS — Z12.31 SCREENING MAMMOGRAM FOR BREAST CANCER: ICD-10-CM

## 2023-03-10 PROCEDURE — 77067 SCR MAMMO BI INCL CAD: CPT

## 2023-03-10 PROCEDURE — 77063 BREAST TOMOSYNTHESIS BI: CPT

## 2023-04-10 ENCOUNTER — OFFICE VISIT (OUTPATIENT)
Dept: SLEEP MEDICINE | Facility: HOSPITAL | Age: 58
End: 2023-04-10
Payer: MEDICARE

## 2023-04-10 VITALS
SYSTOLIC BLOOD PRESSURE: 126 MMHG | OXYGEN SATURATION: 98 % | HEIGHT: 64 IN | BODY MASS INDEX: 31.6 KG/M2 | WEIGHT: 185.1 LBS | DIASTOLIC BLOOD PRESSURE: 77 MMHG | HEART RATE: 73 BPM

## 2023-04-10 DIAGNOSIS — G47.419 NARCOLEPSY WITHOUT CATAPLEXY: ICD-10-CM

## 2023-04-10 DIAGNOSIS — Z99.89 OSA ON CPAP: Primary | ICD-10-CM

## 2023-04-10 DIAGNOSIS — G47.33 OSA ON CPAP: Primary | ICD-10-CM

## 2023-04-10 PROCEDURE — 1159F MED LIST DOCD IN RCRD: CPT | Performed by: INTERNAL MEDICINE

## 2023-04-10 PROCEDURE — 99214 OFFICE O/P EST MOD 30 MIN: CPT | Performed by: INTERNAL MEDICINE

## 2023-04-10 PROCEDURE — G0463 HOSPITAL OUTPT CLINIC VISIT: HCPCS

## 2023-04-10 PROCEDURE — 1160F RVW MEDS BY RX/DR IN RCRD: CPT | Performed by: INTERNAL MEDICINE

## 2023-04-10 RX ORDER — MONTELUKAST SODIUM 10 MG/1
TABLET ORAL EVERY 24 HOURS
COMMUNITY
Start: 2023-01-06

## 2023-04-10 RX ORDER — LORATADINE 10 MG/1
1 TABLET ORAL DAILY
Qty: 30 TABLET | Refills: 20 | COMMUNITY
Start: 2022-04-28 | End: 2024-01-06

## 2023-04-10 RX ORDER — FLUTICASONE PROPIONATE 50 MCG
SPRAY, SUSPENSION (ML) NASAL
COMMUNITY
Start: 2023-01-13

## 2023-04-10 RX ORDER — ACYCLOVIR 200 MG/1
CAPSULE ORAL EVERY 12 HOURS SCHEDULED
COMMUNITY
Start: 2023-02-09

## 2023-04-10 RX ORDER — DEXTROAMPHETAMINE SACCHARATE, AMPHETAMINE ASPARTATE, DEXTROAMPHETAMINE SULFATE AND AMPHETAMINE SULFATE 2.5; 2.5; 2.5; 2.5 MG/1; MG/1; MG/1; MG/1
TABLET ORAL
Qty: 90 TABLET | Refills: 0 | Status: SHIPPED | OUTPATIENT
Start: 2023-04-10

## 2023-04-10 RX ORDER — ZINC GLUCONATE 50 MG
TABLET ORAL DAILY
COMMUNITY

## 2023-04-10 NOTE — PROGRESS NOTES
[facility]  [address] [place] [address]  [place]   [place] [address]  Phone: [phone]  Fax: [phone]      SLEEP CLINIC FOLLOW UP PROGRESS NOTE.    Maia Hui  [id number]   1965  58 y.o.  female      PCP: Josephine Torres MD      Date of visit: 4/10/2023    Chief Complaint   Patient presents with   • Sleep Apnea   • Daytime Sleepiness       HPI:  This is a 58 y.o. years old patient is here for the management of obstructive sleep apnea.  Sleep apnea is mild in severity with a AHI of 6/hr.Patient is using positive airway pressure  therapy and the symptoms of sleep apnea have improved significantly on AutoSet. Normally patient goes to bed at 11 PM and wakes up at 8 AM .  The patient wakes up 3 time(s) during the night and has no problem going back to sleep.  Feels refreshed after waking up.      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  She was tested at [facility].  She is on Adderall extended release 20 mg in the morning and Adderall IR 10 mg in the afternoon.  She gets prescriptions from [facility] for 90 days supply at a time.  At present she is getting extended release prescription from Dr Josephine Torres who is a hematology and oncologist at [facility] and IR from sleep center.  She used to see Dr. Sharp some of the prescriptions were maintained in this fashion.  I have talked to the patient about switching over the extended release also to sleep center so that we can take care of both extended release and immediate release.  I have made a drug agreement today..     Medications and allergies are reviewed by me and documented in the encounter.     SOCIAL ( habits pertaining to sleep medicine)  • History of tobacco use:No   • History of alcohol use: 0 per week  • Caffeine use: 2    REVIEW OF SYSTEMS:   Pertaining positive symptoms:  • Cosmos Sleepiness Scale :Total score: 7   • Nasal congestion  • Dry mouth      PHYSICAL  "EXAMINATION:  CONSTITUTIONAL:  Vitals:    04/10/23 0900   BP: 126/77   Pulse: 73   SpO2: 98%   Weight: 84 kg (185 lb 1.6 oz)   Height: 162.6 cm (64.02\")    Body mass index is 31.76 kg/m².   NOSE: nasal passages are clear, No deformities noted   RESP SYSTEM: Not in any respiratory distress, no chest deformities noted,   CARDIOVASULAR: No edema noted  NEURO: Oriented x 3, gait normal,  Mood and affect appeared appropriate      Data reviewed:  The Smart card downloaded on 4/10/2023 has been reviewed independently by me for compliance and discussed the data with the patient.   Compliance; 99%  More than 4 hr use, 99%  Average use of the device 9 hours and 12-minute per night  Residual AHI: 5 /hr (goal < 5.0 /hr)  Mask type: Nasal med  Device: ResMed  DME: [facility]    Drug agreement made for both Adderall extended release and immediate release    ASSESSMENT AND PLAN:  · Obstructive sleep apnea ( G 47.33).  The symptoms of sleep apnea have improved with the device and the treatment.  Patient's compliance with the device is excellent for treatment of sleep apnea.  I have independently reviewed the smart card down load and discussed with the patient the download data and encouarged the patient to continue to use the device.The residual AHI is acceptable. The device is benefiting the patient and the device is medically necessary.  Without proper control of sleep apnea and good compliance there is a increased risk for hypertension, diabetes mellitus and nonrestorative sleep with hypersomnia which can increase risk for motor vehicle accidents.  Untreated sleep apnea is also a risk factor for development of atrial fibrillation, pulmonary hypertension, insulin resistance and stroke. The patient is also instructed to get the supplies from the [facility] and and change them on a regular basis.  A prescription for supplies has been sent to the [facility].  I have also discussed the good sleep hygiene habits and adequate amount " of sleep needed for good health.  · Narcolepsy without cataplexy, continue Adderall extended release and IR release at the same dose but switch the prescription to sleep center for both medications.  · Obesity 1 with BMI is Body mass index is 31.76 kg/m².. I have discuss the relationship between the weight and sleep apnea. The benefit of weight loss in reducing severity of sleep apnea was discussed. Discussed diet and exercise with the patient to achieve ideal BMI.  · Return in about 6 months (around 10/10/2023) for Next scheduled follow-up. . Patient's questions were answered.    Please note when she comes for a prescription in May for extended release I will give her 2-month supply and then every 3 months for both extended release and IR so that it will be on the same schedule      Ramila Mart MD  Sleep Medicine  Medical Director,   [facility] sleep [facility]  4/10/2023

## 2023-04-22 ENCOUNTER — APPOINTMENT (OUTPATIENT)
Dept: CT IMAGING | Facility: HOSPITAL | Age: 58
End: 2023-04-22
Payer: MEDICARE

## 2023-04-22 ENCOUNTER — APPOINTMENT (OUTPATIENT)
Dept: GENERAL RADIOLOGY | Facility: HOSPITAL | Age: 58
End: 2023-04-22
Payer: MEDICARE

## 2023-04-22 ENCOUNTER — HOSPITAL ENCOUNTER (EMERGENCY)
Facility: HOSPITAL | Age: 58
Discharge: HOME | End: 2023-04-22
Attending: EMERGENCY MEDICINE
Payer: MEDICARE

## 2023-04-22 VITALS
WEIGHT: 182.54 LBS | BODY MASS INDEX: 31.16 KG/M2 | OXYGEN SATURATION: 100 % | HEART RATE: 56 BPM | SYSTOLIC BLOOD PRESSURE: 115 MMHG | HEIGHT: 64 IN | RESPIRATION RATE: 16 BRPM | DIASTOLIC BLOOD PRESSURE: 75 MMHG

## 2023-04-22 DIAGNOSIS — S16.1XXA ACUTE STRAIN OF NECK MUSCLE, INITIAL ENCOUNTER: ICD-10-CM

## 2023-04-22 DIAGNOSIS — S01.01XA LACERATION OF SCALP, INITIAL ENCOUNTER: ICD-10-CM

## 2023-04-22 DIAGNOSIS — S70.12XA CONTUSION OF LEFT THIGH, INITIAL ENCOUNTER: ICD-10-CM

## 2023-04-22 DIAGNOSIS — S20.229A CONTUSION OF MID BACK, INITIAL ENCOUNTER: ICD-10-CM
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
## 2023-04-22 DIAGNOSIS — S00.03XA HEMATOMA OF SCALP, INITIAL ENCOUNTER: ICD-10-CM

## 2023-04-22 PROCEDURE — 99283 EMERGENCY DEPT VISIT LOW MDM: CPT

## 2023-04-22 PROCEDURE — 71100 X-RAY EXAM RIBS UNI 2 VIEWS: CPT

## 2023-04-22 PROCEDURE — 72125 CT NECK SPINE W/O DYE: CPT

## 2023-04-22 PROCEDURE — 25010000002 KETOROLAC TROMETHAMINE PER 15 MG: Performed by: EMERGENCY MEDICINE

## 2023-04-22 PROCEDURE — 72072 X-RAY EXAM THORAC SPINE 3VWS: CPT

## 2023-04-22 PROCEDURE — 73590 X-RAY EXAM OF LOWER LEG: CPT

## 2023-04-22 PROCEDURE — 70450 CT HEAD/BRAIN W/O DYE: CPT

## 2023-04-22 PROCEDURE — 73552 X-RAY EXAM OF FEMUR 2/>: CPT

## 2023-04-22 PROCEDURE — 96372 THER/PROPH/DIAG INJ SC/IM: CPT

## 2023-04-22 PROCEDURE — 71101 X-RAY EXAM UNILAT RIBS/CHEST: CPT

## 2023-04-22 RX ORDER — HYDROCODONE BITARTRATE AND ACETAMINOPHEN 5; 325 MG/1; MG/1
1 TABLET ORAL ONCE
Status: COMPLETED | OUTPATIENT
Start: 2023-04-22 | End: 2023-04-22

## 2023-04-22 RX ORDER — KETOROLAC TROMETHAMINE 30 MG/ML
30 INJECTION, SOLUTION INTRAMUSCULAR; INTRAVENOUS ONCE
Status: COMPLETED | OUTPATIENT
Start: 2023-04-22 | End: 2023-04-22

## 2023-04-22 RX ORDER — HYDROCODONE BITARTRATE AND ACETAMINOPHEN 5; 325 MG/1; MG/1
1 TABLET ORAL EVERY 6 HOURS PRN
Qty: 10 TABLET | Refills: 0 | Status: SHIPPED | OUTPATIENT
Start: 2023-04-22

## 2023-04-22 RX ORDER — GINSENG 100 MG
1 CAPSULE ORAL ONCE
Status: COMPLETED | OUTPATIENT
Start: 2023-04-22 | End: 2023-04-22

## 2023-04-22 RX ADMIN — BACITRACIN 0.9 G: 500 OINTMENT TOPICAL at 17:48

## 2023-04-22 RX ADMIN — KETOROLAC TROMETHAMINE 30 MG: 30 INJECTION, SOLUTION INTRAMUSCULAR; INTRAVENOUS at 16:10

## 2023-04-22 RX ADMIN — HYDROCODONE BITARTRATE AND ACETAMINOPHEN 1 TABLET: 5; 325 TABLET ORAL at 17:48

## 2023-04-22 NOTE — DISCHARGE INSTRUCTIONS
I placed 1 staple in your scalp laceration today, which you can take out in 7 to 10 days.    Try to keep the wound clean and be careful not to pull out the staple when you are washing your hair.    You can take Tylenol or ibuprofen for pain and only use the hydrocodone if the pain is severe.    All of your CT scan of the head and neck looked okay and all of your cervical spine hardware is still in the correct place.    Your x-rays of the thoracic spine and the left thigh and lower leg came back normal.  The rib x-rays came back normal and no obvious rib fractures were seen.      It is possible you either have some badly bruised ribs or possibly even some hairline fractures of the ribs that were not able to be seen on x-ray today, but the management is simply rest and letting them heal over the next couple of weeks.    Obviously you have a lot of bumps and bruises and strained muscles and you are going to be sore for a few days so get some rest.

## 2023-05-17 ENCOUNTER — TRANSCRIBE ORDERS (OUTPATIENT)
Dept: ADMINISTRATIVE | Facility: HOSPITAL | Age: 58
End: 2023-05-17
Payer: MEDICARE

## 2023-05-17 ENCOUNTER — HOSPITAL ENCOUNTER (OUTPATIENT)
Dept: GENERAL RADIOLOGY | Facility: HOSPITAL | Age: 58
Discharge: HOME | End: 2023-05-17
Admitting: INTERNAL MEDICINE
Payer: MEDICARE
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
## 2023-05-17 PROCEDURE — 71101 X-RAY EXAM UNILAT RIBS/CHEST: CPT

## 2023-05-22 ENCOUNTER — PRE-ADMISSION TESTING (OUTPATIENT)
Dept: PREADMISSION TESTING | Facility: HOSPITAL | Age: 58
End: 2023-05-22
Payer: MEDICARE

## 2023-05-22 VITALS
TEMPERATURE: 97.1 F | WEIGHT: 185.2 LBS | HEIGHT: 64 IN | DIASTOLIC BLOOD PRESSURE: 84 MMHG | HEART RATE: 69 BPM | BODY MASS INDEX: 31.62 KG/M2 | OXYGEN SATURATION: 98 % | SYSTOLIC BLOOD PRESSURE: 129 MMHG | RESPIRATION RATE: 16 BRPM

## 2023-05-22 LAB
ANION GAP SERPL CALCULATED.3IONS-SCNC: 8.2 MMOL/L (ref 5–15)
BUN SERPL-MCNC: 15 MG/DL (ref 8–23)
BUN/CREAT SERPL: 21.1 (ref 7–25)
CALCIUM SPEC-SCNC: 10 MG/DL (ref 8.6–10.5)
CHLORIDE SERPL-SCNC: 104 MMOL/L (ref 98–107)
CO2 SERPL-SCNC: 30.8 MMOL/L (ref 22–29)
CREAT SERPL-MCNC: 0.71 MG/DL (ref 0.57–1)
DEPRECATED RDW RBC AUTO: 41.5 FL (ref 37–54)
EGFRCR SERPLBLD CKD-EPI 2021: 97.5 ML/MIN/1.73
ERYTHROCYTE [DISTWIDTH] IN BLOOD BY AUTOMATED COUNT: 12.5 % (ref 12.3–15.4)
GLUCOSE SERPL-MCNC: 97 MG/DL (ref 65–99)
HCT VFR BLD AUTO: 37.9 % (ref 34–46.6)
HGB BLD-MCNC: 13.1 G/DL (ref 12–15.9)
MCH RBC QN AUTO: 31.3 PG (ref 26.6–33)
MCHC RBC AUTO-ENTMCNC: 34.6 G/DL (ref 31.5–35.7)
MCV RBC AUTO: 90.5 FL (ref 79–97)
PLATELET # BLD AUTO: 248 10*3/MM3 (ref 140–450)
PMV BLD AUTO: 10.8 FL (ref 6–12)
POTASSIUM SERPL-SCNC: 4 MMOL/L (ref 3.5–5.2)
QT INTERVAL: 456 MS
RBC # BLD AUTO: 4.19 10*6/MM3 (ref 3.77–5.28)
SODIUM SERPL-SCNC: 143 MMOL/L (ref 136–145)
WBC NRBC COR # BLD: 6.77 10*3/MM3 (ref 3.4–10.8)

## 2023-05-22 PROCEDURE — 80048 BASIC METABOLIC PNL TOTAL CA: CPT

## 2023-05-22 PROCEDURE — 93005 ELECTROCARDIOGRAM TRACING: CPT

## 2023-05-22 PROCEDURE — 85027 COMPLETE CBC AUTOMATED: CPT

## 2023-05-22 PROCEDURE — 36415 COLL VENOUS BLD VENIPUNCTURE: CPT

## 2023-05-22 NOTE — DISCHARGE INSTRUCTIONS
Take the following medications the morning of surgery: MAY TAKE TRAMADOL AS NEEDED      If you are on prescription narcotic pain medication to control your pain you may also take that medication the morning of surgery.    General Instructions:  Do not eat solid food after midnight the night before surgery.  You may drink clear liquids day of surgery but must stop at least one hour before your hospital arrival time.  It is beneficial for you to have a clear drink that contains carbohydrates the day of surgery.  We suggest a 12 to 20 ounce bottle of Gatorade or Powerade for non-diabetic patients or a 12 to 20 ounce bottle of G2 or Powerade Zero for diabetic patients.     Clear liquids are liquids you can see through.  Nothing red in color.     Plain water                               Sports drinks  Sodas                                   Gelatin (Jell-O)  Fruit juices without pulp such as white grape juice and apple juice  Popsicles that contain no fruit or yogurt  Tea or coffee (no cream or milk added)  Gatorade / Powerade  G2 / Powerade Zero    If applicable bring your C-PAP/ BI-PAP machine in with you to preop day of surgery.  Bring any papers given to you in the doctor’s office.  Wear clean comfortable clothes.  Do not wear contact lenses, false eyelashes or make-up.  Bring a case for your glasses.   Remove all piercings.  Leave jewelry and any other valuables at home.  The Pre-Admission Testing nurse will instruct you to bring medications if unable to obtain an accurate list in Pre-Admission Testing.            Preventing a Surgical Site Infection:  For 2 to 3 days before surgery, avoid shaving with a razor because the razor can irritate skin and make it easier to develop an infection.    Any areas of open skin can increase the risk of a post-operative wound infection by allowing bacteria to enter and travel throughout the body.  Notify your surgeon if you have any skin wounds / rashes even if it is not near the  expected surgical site.  The area will need assessed to determine if surgery should be delayed until it is healed.  The night prior to surgery shower using a fresh bar of anti-bacterial soap (such as Dial) and clean washcloth.  Sleep in a clean bed with clean clothing.  Do not allow pets to sleep with you.  Shower on the morning of surgery using a fresh bar of anti-bacterial soap (such as Dial) and clean washcloth.  Dry with a clean towel and dress in clean clothing.  Ask your surgeon if you will be receiving antibiotics prior to surgery.  Make sure you, your family, and all healthcare providers clean their hands with soap and water or an alcohol based hand  before caring for you or your wound.    Day of surgery:  Your arrival time is approximately two hours before your scheduled surgery time.  Upon arrival, a Pre-op nurse and Anesthesiologist will review your health history, obtain vital signs, and answer questions you may have.  The only belongings needed at this time will be a list of your home medications and if applicable your C-PAP/BI-PAP machine.  A Pre-op nurse will start an IV and you may receive medication in preparation for surgery, including something to help you relax.     Please be aware that surgery does come with discomfort.  We want to make every effort to control your discomfort so please discuss any uncontrolled symptoms with your nurse.   Your doctor will most likely have prescribed pain medications.      If you are going home after surgery you will receive individualized written care instructions before being discharged.  A responsible adult must drive you to and from the hospital on the day of your surgery and stay with you for 24 hours.  Discharge prescriptions can be filled by the hospital pharmacy during regular pharmacy hours.  If you are having surgery late in the day/evening your prescription may be e-prescribed to your pharmacy.  Please verify your pharmacy hours or chose a 24  hour pharmacy to avoid not having access to your prescription because your pharmacy has closed for the day.    If you are staying overnight following surgery, you will be transported to your hospital room following the recovery period.  [facility] has all private rooms.    If you have any questions please call Pre-Admission Testing at [phone].  Deductibles and co-payments are collected on the day of service. Please be prepared to pay the required co-pay, deductible or deposit on the day of service as defined by your plan.    Call your surgeon immediately if you experience any of the following symptoms:  Sore Throat  Shortness of Breath or difficulty breathing  Cough  Chills  Body soreness or muscle pain  Headache  Fever  New loss of taste or smell  Do not arrive for your surgery ill.  Your procedure will need to be rescheduled to another time.  You will need to call your physician before the day of surgery to avoid any unnecessary exposure to hospital staff as well as other patients.

## 2023-05-31 ENCOUNTER — TELEPHONE (OUTPATIENT)
Dept: SLEEP MEDICINE | Facility: HOSPITAL | Age: 58
End: 2023-05-31

## 2023-05-31 NOTE — TELEPHONE ENCOUNTER
Maia Hui 1965 requesting med refill on adderall IR and XR.  Last note states that we will now order both adderalls this month, as Dr. Torres was ordering one of these for the pt.    Call to pt to let her know her written scripts are here at the  for her to .

## 2023-06-07 ENCOUNTER — ANESTHESIA EVENT (OUTPATIENT)
Dept: PERIOP | Facility: HOSPITAL | Age: 58
End: 2023-06-07
Payer: MEDICARE

## 2023-06-07 ENCOUNTER — ANESTHESIA (OUTPATIENT)
Dept: PERIOP | Facility: HOSPITAL | Age: 58
End: 2023-06-07
Payer: MEDICARE

## 2023-06-07 ENCOUNTER — HOSPITAL ENCOUNTER (OUTPATIENT)
Facility: HOSPITAL | Age: 58
Discharge: HOME | End: 2023-06-07
Attending: OBSTETRICS & GYNECOLOGY | Admitting: OBSTETRICS & GYNECOLOGY
Payer: MEDICARE

## 2023-06-07 VITALS
OXYGEN SATURATION: 97 % | RESPIRATION RATE: 16 BRPM | TEMPERATURE: 98.4 F | DIASTOLIC BLOOD PRESSURE: 71 MMHG | HEART RATE: 53 BPM | SYSTOLIC BLOOD PRESSURE: 128 MMHG

## 2023-06-07 DIAGNOSIS — G47.33 OSA ON CPAP: Primary | ICD-10-CM

## 2023-06-07 DIAGNOSIS — N81.4 UTERINE PROLAPSE: ICD-10-CM

## 2023-06-07 DIAGNOSIS — N81.10 VAGINAL PROLAPSE: ICD-10-CM

## 2023-06-07 DIAGNOSIS — Z99.89 OSA ON CPAP: Primary | ICD-10-CM

## 2023-06-07 PROCEDURE — A9270 NON-COVERED ITEM OR SERVICE: HCPCS | Performed by: NURSE ANESTHETIST, CERTIFIED REGISTERED

## 2023-06-07 PROCEDURE — 25010000002 CEFAZOLIN IN DEXTROSE 2-4 GM/100ML-% SOLUTION: Performed by: NURSE ANESTHETIST, CERTIFIED REGISTERED

## 2023-06-07 PROCEDURE — 25010000002 PROPOFOL 10 MG/ML EMULSION: Performed by: NURSE ANESTHETIST, CERTIFIED REGISTERED

## 2023-06-07 PROCEDURE — 25010000002 FENTANYL CITRATE (PF) 50 MCG/ML SOLUTION: Performed by: NURSE ANESTHETIST, CERTIFIED REGISTERED

## 2023-06-07 PROCEDURE — 25010000002 MAGNESIUM SULFATE PER 500 MG OF MAGNESIUM: Performed by: NURSE ANESTHETIST, CERTIFIED REGISTERED

## 2023-06-07 PROCEDURE — 25010000002 HYDROMORPHONE PER 4 MG: Performed by: NURSE ANESTHETIST, CERTIFIED REGISTERED

## 2023-06-07 PROCEDURE — 25010000002 SUGAMMADEX 200 MG/2ML SOLUTION: Performed by: NURSE ANESTHETIST, CERTIFIED REGISTERED

## 2023-06-07 PROCEDURE — C1763 CONN TISS, NON-HUMAN: HCPCS | Performed by: OBSTETRICS & GYNECOLOGY

## 2023-06-07 PROCEDURE — 25010000002 DEXAMETHASONE SODIUM PHOSPHATE 20 MG/5ML SOLUTION: Performed by: NURSE ANESTHETIST, CERTIFIED REGISTERED

## 2023-06-07 PROCEDURE — 25010000002 EPINEPHRINE PER 0.1 MG: Performed by: OBSTETRICS & GYNECOLOGY

## 2023-06-07 PROCEDURE — 25010000002 MIDAZOLAM PER 1 MG: Performed by: ANESTHESIOLOGY

## 2023-06-07 PROCEDURE — 63710000001 HYDROCODONE-ACETAMINOPHEN 7.5-325 MG TABLET: Performed by: NURSE ANESTHETIST, CERTIFIED REGISTERED

## 2023-06-07 PROCEDURE — 25010000002 CEFAZOLIN IN DEXTROSE 2-4 GM/100ML-% SOLUTION: Performed by: STUDENT IN AN ORGANIZED HEALTH CARE EDUCATION/TRAINING PROGRAM

## 2023-06-07 PROCEDURE — 25010000002 ONDANSETRON PER 1 MG: Performed by: NURSE ANESTHETIST, CERTIFIED REGISTERED

## 2023-06-07 DEVICE — KNOTLESS TISSUE CONTROL DEVICE, UNDYED UNIDIRECTIONAL (ANTIBACTERIAL) SYNTHETIC ABSORBABLE DEVICE
Type: IMPLANTABLE DEVICE | Site: ABDOMEN | Status: FUNCTIONAL
Brand: STRATAFIX

## 2023-06-07 DEVICE — KNOTLESS TISSUE CONTROL DEVICE, VIOLET UNIDIRECTIONAL (ANTIBACTERIAL) SYNTHETIC ABSORBABLE DEVICE
Type: IMPLANTABLE DEVICE | Site: VAGINA | Status: FUNCTIONAL
Brand: STRATAFIX

## 2023-06-07 DEVICE — POLYPROPYLENE MESH FOR SACROCOLPOSUSPENSION/SACROCOLPOPEXY - Y
Type: IMPLANTABLE DEVICE | Site: ABDOMEN | Status: FUNCTIONAL
Brand: RESTORELLE

## 2023-06-07 RX ORDER — SCOLOPAMINE TRANSDERMAL SYSTEM 1 MG/1
1 PATCH, EXTENDED RELEASE TRANSDERMAL ONCE
Status: DISCONTINUED | OUTPATIENT
Start: 2023-06-07 | End: 2023-06-07 | Stop reason: HOSPADM

## 2023-06-07 RX ORDER — SODIUM CHLORIDE 9 MG/ML
INJECTION, SOLUTION INTRAVENOUS AS NEEDED
Status: DISCONTINUED | OUTPATIENT
Start: 2023-06-07 | End: 2023-06-07 | Stop reason: HOSPADM

## 2023-06-07 RX ORDER — ONDANSETRON 2 MG/ML
4 INJECTION INTRAMUSCULAR; INTRAVENOUS ONCE AS NEEDED
Status: DISCONTINUED | OUTPATIENT
Start: 2023-06-07 | End: 2023-06-07 | Stop reason: HOSPADM

## 2023-06-07 RX ORDER — SODIUM CHLORIDE 0.9 % (FLUSH) 0.9 %
3 SYRINGE (ML) INJECTION EVERY 12 HOURS SCHEDULED
Status: DISCONTINUED | OUTPATIENT
Start: 2023-06-07 | End: 2023-06-07 | Stop reason: HOSPADM

## 2023-06-07 RX ORDER — SODIUM CHLORIDE, SODIUM LACTATE, POTASSIUM CHLORIDE, CALCIUM CHLORIDE 600; 310; 30; 20 MG/100ML; MG/100ML; MG/100ML; MG/100ML
9 INJECTION, SOLUTION INTRAVENOUS CONTINUOUS
Status: DISCONTINUED | OUTPATIENT
Start: 2023-06-07 | End: 2023-06-07 | Stop reason: HOSPADM

## 2023-06-07 RX ORDER — OXYCODONE AND ACETAMINOPHEN 7.5; 325 MG/1; MG/1
1 TABLET ORAL EVERY 4 HOURS PRN
Status: DISCONTINUED | OUTPATIENT
Start: 2023-06-07 | End: 2023-06-07 | Stop reason: HOSPADM

## 2023-06-07 RX ORDER — FENTANYL CITRATE 50 UG/ML
50 INJECTION, SOLUTION INTRAMUSCULAR; INTRAVENOUS ONCE AS NEEDED
Status: DISCONTINUED | OUTPATIENT
Start: 2023-06-07 | End: 2023-06-07 | Stop reason: HOSPADM

## 2023-06-07 RX ORDER — EPHEDRINE SULFATE 50 MG/ML
5 INJECTION, SOLUTION INTRAVENOUS ONCE AS NEEDED
Status: DISCONTINUED | OUTPATIENT
Start: 2023-06-07 | End: 2023-06-07 | Stop reason: HOSPADM

## 2023-06-07 RX ORDER — LABETALOL HYDROCHLORIDE 5 MG/ML
5 INJECTION, SOLUTION INTRAVENOUS
Status: DISCONTINUED | OUTPATIENT
Start: 2023-06-07 | End: 2023-06-07 | Stop reason: HOSPADM

## 2023-06-07 RX ORDER — HYDROCODONE BITARTRATE AND ACETAMINOPHEN 7.5; 325 MG/1; MG/1
1 TABLET ORAL ONCE AS NEEDED
Status: COMPLETED | OUTPATIENT
Start: 2023-06-07 | End: 2023-06-07

## 2023-06-07 RX ORDER — FAMOTIDINE 10 MG/ML
20 INJECTION, SOLUTION INTRAVENOUS ONCE
Status: COMPLETED | OUTPATIENT
Start: 2023-06-07 | End: 2023-06-07

## 2023-06-07 RX ORDER — LIDOCAINE HYDROCHLORIDE 10 MG/ML
0.5 INJECTION, SOLUTION EPIDURAL; INFILTRATION; INTRACAUDAL; PERINEURAL ONCE AS NEEDED
Status: DISCONTINUED | OUTPATIENT
Start: 2023-06-07 | End: 2023-06-07 | Stop reason: HOSPADM

## 2023-06-07 RX ORDER — GLYCOPYRROLATE 0.2 MG/ML
INJECTION INTRAMUSCULAR; INTRAVENOUS AS NEEDED
Status: DISCONTINUED | OUTPATIENT
Start: 2023-06-07 | End: 2023-06-07 | Stop reason: SURG

## 2023-06-07 RX ORDER — HYDROMORPHONE HYDROCHLORIDE 1 MG/ML
0.5 INJECTION, SOLUTION INTRAMUSCULAR; INTRAVENOUS; SUBCUTANEOUS
Status: DISCONTINUED | OUTPATIENT
Start: 2023-06-07 | End: 2023-06-07 | Stop reason: HOSPADM

## 2023-06-07 RX ORDER — PROMETHAZINE HYDROCHLORIDE 25 MG/1
25 TABLET ORAL ONCE AS NEEDED
Status: DISCONTINUED | OUTPATIENT
Start: 2023-06-07 | End: 2023-06-07 | Stop reason: HOSPADM

## 2023-06-07 RX ORDER — CEFAZOLIN SODIUM 2 G/100ML
INJECTION, SOLUTION INTRAVENOUS AS NEEDED
Status: DISCONTINUED | OUTPATIENT
Start: 2023-06-07 | End: 2023-06-07 | Stop reason: SURG

## 2023-06-07 RX ORDER — DROPERIDOL 2.5 MG/ML
0.62 INJECTION, SOLUTION INTRAMUSCULAR; INTRAVENOUS
Status: DISCONTINUED | OUTPATIENT
Start: 2023-06-07 | End: 2023-06-07 | Stop reason: HOSPADM

## 2023-06-07 RX ORDER — LIDOCAINE HYDROCHLORIDE 20 MG/ML
INJECTION, SOLUTION INFILTRATION; PERINEURAL AS NEEDED
Status: DISCONTINUED | OUTPATIENT
Start: 2023-06-07 | End: 2023-06-07 | Stop reason: SURG

## 2023-06-07 RX ORDER — DIPHENHYDRAMINE HYDROCHLORIDE 50 MG/ML
12.5 INJECTION INTRAMUSCULAR; INTRAVENOUS
Status: DISCONTINUED | OUTPATIENT
Start: 2023-06-07 | End: 2023-06-07 | Stop reason: HOSPADM

## 2023-06-07 RX ORDER — MAGNESIUM HYDROXIDE 1200 MG/15ML
LIQUID ORAL AS NEEDED
Status: DISCONTINUED | OUTPATIENT
Start: 2023-06-07 | End: 2023-06-07 | Stop reason: HOSPADM

## 2023-06-07 RX ORDER — PROMETHAZINE HYDROCHLORIDE 25 MG/1
25 SUPPOSITORY RECTAL ONCE AS NEEDED
Status: DISCONTINUED | OUTPATIENT
Start: 2023-06-07 | End: 2023-06-07 | Stop reason: HOSPADM

## 2023-06-07 RX ORDER — FLUMAZENIL 0.1 MG/ML
0.2 INJECTION INTRAVENOUS AS NEEDED
Status: DISCONTINUED | OUTPATIENT
Start: 2023-06-07 | End: 2023-06-07 | Stop reason: HOSPADM

## 2023-06-07 RX ORDER — MIDAZOLAM HYDROCHLORIDE 1 MG/ML
1 INJECTION INTRAMUSCULAR; INTRAVENOUS
Status: DISCONTINUED | OUTPATIENT
Start: 2023-06-07 | End: 2023-06-07 | Stop reason: HOSPADM

## 2023-06-07 RX ORDER — NALOXONE HCL 0.4 MG/ML
0.2 VIAL (ML) INJECTION AS NEEDED
Status: DISCONTINUED | OUTPATIENT
Start: 2023-06-07 | End: 2023-06-07 | Stop reason: HOSPADM

## 2023-06-07 RX ORDER — HYDRALAZINE HYDROCHLORIDE 20 MG/ML
5 INJECTION INTRAMUSCULAR; INTRAVENOUS
Status: DISCONTINUED | OUTPATIENT
Start: 2023-06-07 | End: 2023-06-07 | Stop reason: HOSPADM

## 2023-06-07 RX ORDER — DEXTROSE MONOHYDRATE 25 G/50ML
INJECTION, SOLUTION INTRAVENOUS AS NEEDED
Status: DISCONTINUED | OUTPATIENT
Start: 2023-06-07 | End: 2023-06-07 | Stop reason: HOSPADM

## 2023-06-07 RX ORDER — SODIUM CHLORIDE 0.9 % (FLUSH) 0.9 %
3-10 SYRINGE (ML) INJECTION AS NEEDED
Status: DISCONTINUED | OUTPATIENT
Start: 2023-06-07 | End: 2023-06-07 | Stop reason: HOSPADM

## 2023-06-07 RX ORDER — ROCURONIUM BROMIDE 10 MG/ML
INJECTION, SOLUTION INTRAVENOUS AS NEEDED
Status: DISCONTINUED | OUTPATIENT
Start: 2023-06-07 | End: 2023-06-07 | Stop reason: SURG

## 2023-06-07 RX ORDER — MAGNESIUM SULFATE HEPTAHYDRATE 500 MG/ML
INJECTION, SOLUTION INTRAMUSCULAR; INTRAVENOUS AS NEEDED
Status: DISCONTINUED | OUTPATIENT
Start: 2023-06-07 | End: 2023-06-07 | Stop reason: SURG

## 2023-06-07 RX ORDER — IPRATROPIUM BROMIDE AND ALBUTEROL SULFATE 2.5; .5 MG/3ML; MG/3ML
3 SOLUTION RESPIRATORY (INHALATION) ONCE AS NEEDED
Status: DISCONTINUED | OUTPATIENT
Start: 2023-06-07 | End: 2023-06-07 | Stop reason: HOSPADM

## 2023-06-07 RX ORDER — DEXAMETHASONE SODIUM PHOSPHATE 4 MG/ML
INJECTION, SOLUTION INTRA-ARTICULAR; INTRALESIONAL; INTRAMUSCULAR; INTRAVENOUS; SOFT TISSUE AS NEEDED
Status: DISCONTINUED | OUTPATIENT
Start: 2023-06-07 | End: 2023-06-07 | Stop reason: SURG

## 2023-06-07 RX ORDER — FENTANYL CITRATE 50 UG/ML
INJECTION, SOLUTION INTRAMUSCULAR; INTRAVENOUS AS NEEDED
Status: DISCONTINUED | OUTPATIENT
Start: 2023-06-07 | End: 2023-06-07 | Stop reason: SURG

## 2023-06-07 RX ORDER — ONDANSETRON 2 MG/ML
INJECTION INTRAMUSCULAR; INTRAVENOUS AS NEEDED
Status: DISCONTINUED | OUTPATIENT
Start: 2023-06-07 | End: 2023-06-07 | Stop reason: SURG

## 2023-06-07 RX ORDER — SODIUM CHLORIDE, SODIUM LACTATE, POTASSIUM CHLORIDE, CALCIUM CHLORIDE 600; 310; 30; 20 MG/100ML; MG/100ML; MG/100ML; MG/100ML
INJECTION, SOLUTION INTRAVENOUS CONTINUOUS PRN
Status: DISCONTINUED | OUTPATIENT
Start: 2023-06-07 | End: 2023-06-07 | Stop reason: SURG

## 2023-06-07 RX ORDER — CEFAZOLIN SODIUM 2 G/100ML
2 INJECTION, SOLUTION INTRAVENOUS ONCE
Status: COMPLETED | OUTPATIENT
Start: 2023-06-07 | End: 2023-06-07

## 2023-06-07 RX ORDER — FENTANYL CITRATE 50 UG/ML
50 INJECTION, SOLUTION INTRAMUSCULAR; INTRAVENOUS
Status: DISCONTINUED | OUTPATIENT
Start: 2023-06-07 | End: 2023-06-07 | Stop reason: HOSPADM

## 2023-06-07 RX ORDER — PROPOFOL 10 MG/ML
VIAL (ML) INTRAVENOUS AS NEEDED
Status: DISCONTINUED | OUTPATIENT
Start: 2023-06-07 | End: 2023-06-07 | Stop reason: SURG

## 2023-06-07 RX ADMIN — ROCURONIUM BROMIDE 10 MG: 50 INJECTION, SOLUTION INTRAVENOUS at 11:30

## 2023-06-07 RX ADMIN — SUGAMMADEX 200 MG: 100 INJECTION, SOLUTION INTRAVENOUS at 12:41

## 2023-06-07 RX ADMIN — DEXAMETHASONE SODIUM PHOSPHATE 6 MG: 4 INJECTION, SOLUTION INTRAMUSCULAR; INTRAVENOUS at 08:04

## 2023-06-07 RX ADMIN — ROCURONIUM BROMIDE 10 MG: 50 INJECTION, SOLUTION INTRAVENOUS at 10:13

## 2023-06-07 RX ADMIN — HYDROCODONE BITARTRATE AND ACETAMINOPHEN 1 TABLET: 7.5; 325 TABLET ORAL at 14:09

## 2023-06-07 RX ADMIN — GLYCOPYRROLATE 0.2 MG: 1 INJECTION INTRAMUSCULAR; INTRAVENOUS at 08:15

## 2023-06-07 RX ADMIN — ROCURONIUM BROMIDE 10 MG: 50 INJECTION, SOLUTION INTRAVENOUS at 10:48

## 2023-06-07 RX ADMIN — HYDROMORPHONE HYDROCHLORIDE 0.5 MG: 1 INJECTION, SOLUTION INTRAMUSCULAR; INTRAVENOUS; SUBCUTANEOUS at 13:59

## 2023-06-07 RX ADMIN — FENTANYL CITRATE 25 MCG: 50 INJECTION, SOLUTION INTRAMUSCULAR; INTRAVENOUS at 07:45

## 2023-06-07 RX ADMIN — ROCURONIUM BROMIDE 10 MG: 50 INJECTION, SOLUTION INTRAVENOUS at 09:40

## 2023-06-07 RX ADMIN — PROPOFOL 25 MCG/KG/MIN: 10 INJECTION, EMULSION INTRAVENOUS at 08:06

## 2023-06-07 RX ADMIN — FENTANYL CITRATE 25 MCG: 50 INJECTION, SOLUTION INTRAMUSCULAR; INTRAVENOUS at 08:33

## 2023-06-07 RX ADMIN — FENTANYL CITRATE 25 MCG: 50 INJECTION, SOLUTION INTRAMUSCULAR; INTRAVENOUS at 12:31

## 2023-06-07 RX ADMIN — FENTANYL CITRATE 50 MCG: 50 INJECTION, SOLUTION INTRAMUSCULAR; INTRAVENOUS at 13:31

## 2023-06-07 RX ADMIN — CEFAZOLIN SODIUM 2 G: 2 INJECTION, SOLUTION INTRAVENOUS at 07:32

## 2023-06-07 RX ADMIN — FENTANYL CITRATE 25 MCG: 50 INJECTION, SOLUTION INTRAMUSCULAR; INTRAVENOUS at 10:13

## 2023-06-07 RX ADMIN — SODIUM CHLORIDE, POTASSIUM CHLORIDE, SODIUM LACTATE AND CALCIUM CHLORIDE 9 ML/HR: 600; 310; 30; 20 INJECTION, SOLUTION INTRAVENOUS at 06:46

## 2023-06-07 RX ADMIN — PROPOFOL 150 MG: 10 INJECTION, EMULSION INTRAVENOUS at 07:45

## 2023-06-07 RX ADMIN — SODIUM CHLORIDE, POTASSIUM CHLORIDE, SODIUM LACTATE AND CALCIUM CHLORIDE: 600; 310; 30; 20 INJECTION, SOLUTION INTRAVENOUS at 09:27

## 2023-06-07 RX ADMIN — MIDAZOLAM 1 MG: 1 INJECTION INTRAMUSCULAR; INTRAVENOUS at 06:40

## 2023-06-07 RX ADMIN — ROCURONIUM BROMIDE 10 MG: 50 INJECTION, SOLUTION INTRAVENOUS at 09:05

## 2023-06-07 RX ADMIN — ROCURONIUM BROMIDE 50 MG: 50 INJECTION, SOLUTION INTRAVENOUS at 07:45

## 2023-06-07 RX ADMIN — SODIUM CHLORIDE, POTASSIUM CHLORIDE, SODIUM LACTATE AND CALCIUM CHLORIDE: 600; 310; 30; 20 INJECTION, SOLUTION INTRAVENOUS at 07:37

## 2023-06-07 RX ADMIN — ONDANSETRON 4 MG: 2 INJECTION INTRAMUSCULAR; INTRAVENOUS at 12:38

## 2023-06-07 RX ADMIN — Medication 1 APPLICATION: at 08:00

## 2023-06-07 RX ADMIN — CEFAZOLIN SODIUM 2 G: 2 INJECTION, SOLUTION INTRAVENOUS at 11:29

## 2023-06-07 RX ADMIN — LIDOCAINE HYDROCHLORIDE 60 MG: 20 INJECTION, SOLUTION INFILTRATION; PERINEURAL at 07:45

## 2023-06-07 RX ADMIN — SCOPALAMINE 1 PATCH: 1 PATCH, EXTENDED RELEASE TRANSDERMAL at 06:38

## 2023-06-07 RX ADMIN — MAGNESIUM SULFATE HEPTAHYDRATE 2 G: 500 INJECTION, SOLUTION INTRAMUSCULAR; INTRAVENOUS at 08:05

## 2023-06-07 RX ADMIN — ROCURONIUM BROMIDE 10 MG: 50 INJECTION, SOLUTION INTRAVENOUS at 08:10

## 2023-06-07 RX ADMIN — FENTANYL CITRATE 25 MCG: 50 INJECTION, SOLUTION INTRAMUSCULAR; INTRAVENOUS at 12:35

## 2023-06-07 RX ADMIN — FENTANYL CITRATE 25 MCG: 50 INJECTION, SOLUTION INTRAMUSCULAR; INTRAVENOUS at 12:42

## 2023-06-07 RX ADMIN — FAMOTIDINE 20 MG: 10 INJECTION INTRAVENOUS at 06:38

## 2023-06-07 NOTE — INTERVAL H&P NOTE
H&P Update    Procedure: Total robotic hysterectomy, sacrocolpopexy, possible anterior or posterior colporraphies, cystoscopy     Preoperative Diagnosis: POP     Maia Hui was seen and examined by Bg Azar MD.   There have been no significant changes since the completion of the above history and physical.     Patient identified by surgeon, surgical site was confirmed with patient and surgeon.     Patient ready for OR.     Bg Azar MD  [facility] PGY5

## 2023-06-07 NOTE — ANESTHESIA PREPROCEDURE EVALUATION
Anesthesia Evaluation     history of anesthetic complications:  PONV               Airway   Mallampati: II  TM distance: >3 FB  Neck ROM: full  Anterior  Dental      Comment: Caps upper front teeth    Pulmonary    (+) asthma,sleep apnea on CPAP  (-) shortness of breath, recent URI, not a smoker  Cardiovascular     (+) hypertensiondysrhythmias (palpitations), hyperlipidemia  (-) angina    ROS comment: EKG, ECHO, STRESS and HOLTER all OK    Neuro/Psych  (-) seizures, dizziness/light headedness, syncope    ROS Comment: Narcolepsy  GI/Hepatic/Renal/Endo    (-) GERD, liver disease, no renal disease, diabetes    Musculoskeletal     Abdominal    Substance History      OB/GYN          Other          Other Comment: Immunodeficiency, IVIG q 4 wks                  Anesthesia Plan    ASA 3     general     intravenous induction     Anesthetic plan, risks, benefits, and alternatives have been provided, discussed and informed consent has been obtained with: patient.    CODE STATUS:

## 2023-06-07 NOTE — DISCHARGE INSTRUCTIONS
"After the surgery you may:  -Shower  -Walk around the house or no normal activity (light exercise).  Listen to your body and, when tired, stop or rest.  You will have less energy and feel more \"dizzyness\" in the weeks after surgery than is normal for you.  -Drive in 1-2 weeks when you are off pain medication that has narcotics (like Percocet or Vicodin)  -Go up and down stairs  -Lift normal household objects or light people (gallon of milk, laundry basket, tools, infant or toddler)    Please do NOT:  -Place anything in the vagina for 6 weeks after surgery  -Lift >50 pounds (furniture, heavy dogs, or other adult people)    Please contact physician if any of the following  -Nausea/vomitng  -Lack of gas or BM for >24 hours; keep in mind may take 5-7 days after surgery to have bowel movement.  May take 17 grams miralax nightly (safe to take nightly) or dulcolax 10 mg by mouth if that does not help with hard stool or constipation.  -Severe burning or urgency of urination or blood in urine  -Temperature >100 degrees Fahrenheit.  -Severe vaginal bleeding (>1 pad/hour)  -Pain that is not relieved by your regular pain medications  -Fainting or \"passing out\"  -Severe chest pain or shortness of breath with minimal activity    We are always available in the clinic during daytime hours [phone] or during nights and weekends on the answering service [phone].  There is always a doctor on call available to help you who will know the type of surgery you had and how to address your problem.        Scopolamine Patch  This patch has been applied to the skin behind one of your ears.  It may stay in place up to 24 hours. You may remove it at any time after your surgery; however, it should be removed after you are up and walking around the next day.  This medicine reduces stomach upset. Side effects may include: dry mouth, dizziness, sleepiness, constipation, or upset stomach.  An allergy would show up as: a rash, itching, " wheezing or shortness of breath.  Follow these instructions:  Do not drink alcohol, drive or operate machinery while taking this medicine.  Wear only 1 patch at a time. You can leave the patch on for up to 24 hours.  When you remove the patch, fold it in half with the sticky sides together and throw it away. Wash your hands and the area under the patch.  Do not touch your eye with your hand if it has touched the patch.  Wash your hands well before and after touching the patch.  Sit or stand slowly to avoid dizziness.  Call your doctor if you have:  Any sign of allergy  No relief  Trouble passing urine  Any new or severe symptoms

## 2023-06-07 NOTE — ANESTHESIA PROCEDURE NOTES
Airway  Urgency: elective    Date/Time: 6/7/2023 7:49 AM  Airway not difficult    General Information and Staff    Patient location during procedure: OR    Indications and Patient Condition  Indications for airway management: airway protection    Preoxygenated: yes  Mask difficulty assessment: 2 - vent by mask + OA or adjuvant +/- NMBA    Final Airway Details  Final airway type: endotracheal airway      Successful airway: ETT  Cuffed: yes   Successful intubation technique: direct laryngoscopy  Facilitating devices/methods: intubating stylet  Endotracheal tube insertion site: oral  Blade: Ebony  Blade size: 3  ETT size (mm): 7.0  Cormack-Lehane Classification: grade IIb - view of arytenoids or posterior of glottis only  Placement verified by: chest auscultation and capnometry   Measured from: lips  ETT/EBT  to lips (cm): 22  Number of attempts at approach: 1  Assessment: lips, teeth, and gum same as pre-op and atraumatic intubation

## 2023-06-07 NOTE — PROGRESS NOTES
Postop pain scripts sent to pharmacy through [facility] epic

## 2023-06-07 NOTE — OP NOTE
Urogyncology OPERATIVE NOTE    Pre-op Dx:    Pelvic Organ Prolapse    Post-op Dx: Same    Procedure:   1) Robotic assisted total laparoscopic hysterectomy  2) sacrocolpopexy  3) Bilateral salpingectomy  4) Cystoscopy    Surgeon: Dr. Roberts  Asst: Dr. Azar, Dr. Cardoso  Anes: GETA  IVF: 2000cc  EBL: 75cc  Findings:  adhesive disease to the anterior abdominal wall in the lower right abdomen and anterior pelvis - s/p SHANTAL. Normal appearing uterus tubes, and ovaries. Cystoscopy without injury and with rodolfo ureteral jets.  Specimen: uterus, cervix,right and left ovary and fallopian tube   Complications: none  Status: Stable to PACU  Antibiotics: Ancef 2 g IV    Patient was seen in the preoperative area. Risks, benefits and alternatives to the surgery were discussed and consents were reviewed.      The patient was brought to the operating room with IV fluids running. General anesthesia was administered. Patient was the prepared and draped in the usual sterile fashion in dorsal lithotomy position.  A time out was held that confirmed both patient identity and the procedure to be performed. A urinary doll was place under sterile conditions.     A right angle retractor was placed into the vagina. The anterior lip of the cervix was grasped with a single-tooth tenaculum and the cervix was sequentially dilated. A 5 cm tip and 3 cm cup for the SHANNA II uterine manipulator was then placed and the tenaculum was removed.     Atttempt made at [facility] needle placement x2 without success. Decision made to proceed with Bryan entry. The umbilicus was grasped with 2 Allis clamps and a vertical incision was made through the umbilicus and carried down to the underlying fascia with the scalpel.  The fascia was grasped with Kocher clamps and tented upwards.  The fascia was incised with the scalpel. The peritoneum was identified and entered sharply with the Metzenbaum scissors and the abdominal cavity was entered.  An S retractor was  used to keep the incision open.  A 12 mm Bryan trocar was then inserted.   After confirmation of entry into the abdomen was made, the Bryan trocar was then affixed in place with two #0 Vicryl sutures. The abdomen was then insufflated with CO2 gas. The abdomen and pelvis were then inspected as well as the anterior abdominal wall and findings are as noted above.   A 12 mm assistant trocar was placed 10 cm left lateral to the umbilical trocar and a 8 mm robotic trocar was placed 10 cm lateral to the assistant port.  On the right side, an 8 mm robotic trocar was placed 10 cm lateral to the umbilical trocar and the second 8 mm robotic trocar was placed 10 cm lateral and 1 cm inferior to the umbilical port. All of the trocars were introduced under direct visualization. The patient was then placed in steep Trendelenburg position, and the robot was docked at a 45 degree angle to the axis of the patient on the patient's left. The Cadiere forceps were placed in arm 1, the monopolar scissors in arm 2 and the Bipolar forceps in arm 3.  Right abdominal adhesive disease was dissected utilizing monopolar scissors. Anterior pelvic adhesive disease also dissected utilizing monopolar scissors. Cautery was utilized sparingly due to proximity to bowel.     The right fallopian tube was grasped and the mesosalpinx was cut up to the level of the cornua using the monopolar scissors. The right round ligament was grasped, cauterized and transected with the monopolar scissors while being held on gentle countertraction by the other instruments. The anterior and posterior leaves of the broad ligament were then opened on the right side to skeletonize the right uterine arteries. The bladder flap was developed anteriorly. We then skeletonized the infundibulopelvic ligament on the right. The retroperitoneal space was further dissected to identify the right ureter, which was noted to be well below the infundibulopelvic and utero-ovarian ligaments.  We then repeated this on the left side and in a similar fashion, the left mesosalpinx was cut up the level for the left cornua.  The left round ligament was transected, opened the broad ligament, skeletonized the uterine arteries, and uteroovarian ligament, and identified the left ureter.  The monopolar scissors were removed and the robotic vessel sealer was inserted.   At this time the bilateral uteroovarian ligaments and bilateral uterine arteries were then coagulated and transected using the vessel sealer. The vessel sealer was removed and monopolar reinserted.  The bladder flap was further developed and the colpotomy was performed atop the SHANNA II manipulator using the monopolar scissors. The uterus, cervix and bilateral tubes were then delivered into the vagina and handed off the field. An Asepto bulb was placed in the vagina to maintain the pneumoperitoneum. The large needle  was inserted in Arm 2 and the leobardo- suture cut in arm 1.  The vaginal cuff was then closed using #0 Stratefix suture in a running non-locking fashion.   A second horizontal 0 PDS imbricating layer was utilized at the vaginal cuff.  Excellent hemostasis was noted at all the pedicles.  The needle drivers were removed and the Cadiere and monopolar scissor reinserted.    At this point in time, we then turned our attention to the sacral promontory. Bowel was moved out of the pelvis, so that the sacral promontory was adequately visualized. The peritoneum over the promontory was then grasped and an incision was made in the peritoneum using the monopolar scissors. The dissection was carried down until the anterior longitudinal ligament was visualized. The peritoneal incision was then carried down along the right pelvic sidewall staying medial to the ureter and extending over the apex of the vagina to the left lateral aspect of the vaginal cuff. A Lucite stella was then placed in the vagina. The bladder was dissected away from the anterior  vaginal wall down to the level of the superior trigone. The peritoneum was then dissected away from the posterior vaginal wall and the rectovaginal space was then entered as the rectum was dissected away from the posterior vaginal wall.      The monopolar scissors and cadiere forcep were removed and the needle drivers inserted.  The Colpoplast Restorelle Y polypropylene mesh was then introduced into the abdomen with the anterior leaf cut to 4 cm and a posterior leaf cut to 6 cm. The anterior mesh was then affixed to the anterior vaginal wall with 4 corner sutures of 2-0 Prolene. 4 interrupted sutures of 2-0 PDS were then placed transversely along the anterior vaginal wall.     The posterior leaf of the mesh was then affixed to the posterior vaginal wall using 4 corner sutures of 2-0 Prolene.  An additional 6 sutures of 2-0 PDS was placed transversely along the posterior vaginal wall .The sacral promontory was then again visualized and 2 sutures of 2-0 Prolene were then placed through the anterior longitdunial ligament with the first suture approximately 1 cm distal to the promontory and the first suture placed a centimeter proximal to the first.    The tail of the mesh was then brought up towards the sacral promontory and the mesh was adjusted in a way such as to support the vagina without placing undue tension on the vagina. Excess mesh was then trimmed and the 2-0 Prolene sutures were then placed through the mesh and the mesh was then tied down to the sacral promontory.  The peritoneum overlying the mesh was re-approximated in a running non-locking fashion using 2-0 Monocryl with Lapra-Ty at the ends of the suture.   Excellent hemostasis was noted.      Following this, the Uribe catheter was removed and cystourehtroscopy was then performed. There was noted to be efflux of urine from bilateral ureteral orficies and there was no evidence of injury to the bladder or urethra. The bladder was then drained a Uribe  catheter was then reinserted. The robot was then undocked. The assistant trocar was then removed and was then closed using a Ralph-Marquez device and a suture of #0 Vicryl suture. The remaining trocars were removed while visualizing with the scope. Lastly the umbilical trocar was removed. The patient was taken out of [place] position and given 5 large breaths to expel all remaining carbon dioxide gas from the abdomen. The fascia of the umbilical trocar was then closed in a running fashion using 0 vicryl suture.  All remaining skin incisions were closed in a subcuticular fashion using 4-0 Monocryl.  Steri-Strips and Tegaderm were placed over all 5 incisions. Good hemostastis was noted from all trocar sites.    Sponge and needle counts were correct xs 2 - The patient was then awakened from anesthesia and brought to the PACU in stable condition. Dr. Roberts was present for the entire procedure.     Bg Azar MD  [facility] [id number]

## 2023-06-08 LAB
LAB AP CASE REPORT: NORMAL
LAB AP CLINICAL INFORMATION: NORMAL
PATH REPORT.FINAL DX SPEC: NORMAL
PATH REPORT.GROSS SPEC: NORMAL

## 2023-08-09 DIAGNOSIS — G47.419 NARCOLEPSY WITHOUT CATAPLEXY: ICD-10-CM

## 2023-08-09 RX ORDER — DEXTROAMPHETAMINE SACCHARATE, AMPHETAMINE ASPARTATE, DEXTROAMPHETAMINE SULFATE AND AMPHETAMINE SULFATE 2.5; 2.5; 2.5; 2.5 MG/1; MG/1; MG/1; MG/1
TABLET ORAL
Qty: 90 TABLET | Refills: 0 | Status: SHIPPED | OUTPATIENT
Start: 2023-08-09

## 2023-08-09 RX ORDER — DEXTROAMPHETAMINE SACCHARATE, AMPHETAMINE ASPARTATE MONOHYDRATE, DEXTROAMPHETAMINE SULFATE AND AMPHETAMINE SULFATE 5; 5; 5; 5 MG/1; MG/1; MG/1; MG/1
20 CAPSULE, EXTENDED RELEASE ORAL EVERY MORNING
Qty: 90 CAPSULE | Refills: 0 | Status: SHIPPED | OUTPATIENT
Start: 2023-08-09

## 2023-09-17 NOTE — ED PROVIDER NOTES
No Time: 3:06 PM EDT  Date of encounter:  4/22/2023  Independent Historian/Clinical History and Information was obtained by:   Patient  Chief Complaint: fall    History is limited by: N/A    History of Present Illness:  Patient is a 58 y.o. year old female who presents to the emergency department for evaluation of fall with onset today. Pt states that she was 6 feet up on the ladder to get some metal on roof when she fell. Pt states that she is not sure whether she hit gravel or concrete. Pt has neck and back pain. Pt states that she did hit her head but denies LOC.     HPI    Patient Care Team  Primary Care Provider: Josephine Torres MD    Past Medical History:     Allergies   Allergen Reactions   • Molds & Smuts Dermatitis   • Morphine And Related Unknown - High Severity   • Sulfamethoxazole-Trimethoprim Other (See Comments)     Crews-Buzz reaction     • Pravastatin Other (See Comments)     pain in hands and feet     • Acetaminophen Nausea Only     Other reaction(s): nausea   • Chicken Meat (Diagnostic) GI Intolerance     Other reaction(s): vomiting     • Ibuprofen GI Intolerance     Other reaction(s): severe GI upset   • Statins Unknown - High Severity     Pain in hands and feet   • Elavil [Amitriptyline] Anxiety     Past Medical History:   Diagnosis Date   • Chronic EBV infection    • Fibromyalgia    • GERD (gastroesophageal reflux disease)    • Hypercholesteremia    • Hypertension    • Narcolepsy    • Sleep apnea      Past Surgical History:   Procedure Laterality Date   • ANKLE SURGERY      x3 2007,2008,2013   • CERVICAL CORPECTOMY      2011 C5-6, C6-7 12/18/2014, Anterior Cervical Corpectomy C6 Arthrodesis at C5-C6 and C6-C7 Dr. Joya   • LAPAROSCOPIC TUBAL LIGATION  2005   • SINUS SURGERY  2005   • TONSILLECTOMY  1980     Family History   Problem Relation Age of Onset   • Hypertension Mother    • Hyperlipidemia Mother    • Heart disease Mother    • Heart disease Father    • Hypertension Father    •  Heart disease Brother        Home Medications:  Prior to Admission medications    Medication Sig Start Date End Date Taking? Authorizing Provider   acyclovir (ZOVIRAX) 200 MG capsule Every 12 (Twelve) Hours. 2/9/23   Angela Blanco MD   ammonium lactate (LAC-HYDRIN) 12 % lotion Apply  topically to the appropriate area as directed.    Emergency, Nurse GELACIO Wyatt   amphetamine-dextroamphetamine (Adderall) 10 MG tablet One tab in afternoon  90 day supply 4/10/23   Ramila Mart MD   amphetamine-dextroamphetamine XR (ADDERALL XR) 20 MG 24 hr capsule Take  by mouth. 3/28/14   Angela Blanco MD   Biotin 40691 MCG tablet  4/19/22   Angela Blanco MD   cyclobenzaprine (FLEXERIL) 10 MG tablet Take 1 tablet by mouth 3 (Three) Times a Day As Needed for Muscle Spasms. 8/11/21   Michael Joya MD   EVENING PRIMROSE OIL PO Take  by mouth Daily.    Emergency, Nurse Epic, RN   fluticasone (FLONASE) 50 MCG/ACT nasal spray  1/13/23   Angela Blanco MD   HYDROcodone-acetaminophen (NORCO) 7.5-325 MG per tablet Take 1 tablet by mouth Every 6 (Six) Hours As Needed for Moderate Pain.    Angela Blanco MD   Lactobacillus Acid-Pectin (Acidophilus/Citrus Pectin) tablet tablet  3/23/22   Angela Blanco MD   loratadine (CLARITIN) 10 MG tablet Take 1 tablet by mouth Daily. 4/28/22 1/6/24  Angela Blanco MD   losartan-hydrochlorothiazide (HYZAAR) 100-12.5 MG per tablet  2/22/22   Angela Blanco MD   montelukast (SINGULAIR) 10 MG tablet Daily. 1/6/23   Angela Blanco MD   Narcan 4 MG/0.1ML nasal spray  5/4/21   Angela Blanoc MD   traMADol (ULTRAM) 50 MG tablet Take 1 tablet by mouth 2 (Two) Times a Day As Needed for Moderate Pain . 12/15/21   Michael Joya MD   Turmeric Curcumin 500 MG capsule See administration instructions.    Angela Blanco MD   vitamin D (ERGOCALCIFEROL) 1.25 MG (13049 UT) capsule capsule  3/10/22   Angela Blanco MD   Zinc 50  "MG tablet Daily.    Provider, Historical, MD        Social History:   Social History     Tobacco Use   • Smoking status: Former     Packs/day: 0.50     Years: 15.00     Pack years: 7.50     Types: Cigarettes   • Smokeless tobacco: Never   Vaping Use   • Vaping Use: Never used   Substance Use Topics   • Alcohol use: No   • Drug use: Never         Review of Systems:  Review of Systems   Constitutional: Negative for chills and fever.   HENT: Negative for congestion, ear pain and sore throat.    Eyes: Negative for pain.   Respiratory: Negative for cough, chest tightness and shortness of breath.    Cardiovascular: Negative for chest pain.   Gastrointestinal: Negative for abdominal pain, diarrhea, nausea and vomiting.   Genitourinary: Negative for flank pain and hematuria.   Musculoskeletal: Positive for back pain and neck pain. Negative for joint swelling.   Skin: Negative for pallor.   Neurological: Positive for headaches. Negative for seizures.   All other systems reviewed and are negative.       Physical Exam:  /84   Pulse 61   Resp 16   Ht 162.6 cm (64\")   Wt 82.8 kg (182 lb 8.7 oz)   SpO2 98%   BMI 31.33 kg/m²     Physical Exam  Vitals and nursing note reviewed.   Constitutional:       General: She is not in acute distress.     Appearance: Normal appearance. She is not toxic-appearing.   HENT:      Head: Normocephalic and atraumatic.      Comments: Hematoma and 1cm superficial laceration to left occipital scalp     Mouth/Throat:      Mouth: Mucous membranes are moist.   Eyes:      General: No scleral icterus.  Cardiovascular:      Rate and Rhythm: Normal rate and regular rhythm.      Pulses: Normal pulses.      Heart sounds: Normal heart sounds.   Pulmonary:      Effort: Pulmonary effort is normal. No respiratory distress.      Breath sounds: Normal breath sounds.   Abdominal:      General: Abdomen is flat.      Palpations: Abdomen is soft.      Tenderness: There is no abdominal tenderness. "   Musculoskeletal:         General: Normal range of motion.      Cervical back: Normal range of motion and neck supple.      Thoracic back: Tenderness present. No bony tenderness.      Lumbar back: No tenderness or bony tenderness.      Comments: Bruising, tenderness and swelling to left leg and left lateral leg.    Skin:     General: Skin is warm and dry.   Neurological:      Mental Status: She is alert and oriented to person, place, and time. Mental status is at baseline.                  Procedures:  Laceration Repair    Date/Time: 4/22/2023 5:31 PM  Performed by: Beau Oakes MD  Authorized by: Beau Oakes MD     Consent:     Consent obtained:  Verbal    Consent given by:  Patient    Risks, benefits, and alternatives were discussed: yes      Risks discussed:  Pain    Alternatives discussed:  No treatment  Universal protocol:     Test results available: yes      Imaging studies available: yes      Patient identity confirmed:  Verbally with patient and arm band  Anesthesia:     Anesthesia method:  None  Laceration details:     Location:  Scalp    Scalp location:  Occipital    Length (cm):  1  Pre-procedure details:     Preparation:  Imaging obtained to evaluate for foreign bodies  Exploration:     Limited defect created (wound extended): no      Imaging outcome: foreign body not noted      Contaminated: no    Treatment:     Area cleansed with:  Saline    Amount of cleaning:  Standard    Irrigation solution:  Sterile saline    Irrigation method:  Syringe    Visualized foreign bodies/material removed: no      Debridement:  None  Skin repair:     Repair method:  Staples    Number of staples:  1  Approximation:     Approximation:  Close  Repair type:     Repair type:  Simple  Post-procedure details:     Dressing:  Antibiotic ointment    Procedure completion:  Tolerated well, no immediate complications          Medical Decision Making:      Comorbidities that affect care:    HTN, fibromyalgia    External Notes  reviewed:    Past clinic notes      The following orders were placed and all results were independently analyzed by me:  Orders Placed This Encounter   Procedures   • Laceration Repair   • CT Cervical Spine Without Contrast   • XR Spine Thoracic 3 View   • CT Head Without Contrast   • XR Tibia Fibula 2 View Left   • XR Femur 2 View Left   • XR Ribs Left With PA Chest       Medications Given in the Emergency Department:  Medications   HYDROcodone-acetaminophen (NORCO) 5-325 MG per tablet 1 tablet (has no administration in time range)   bacitracin 500 UNIT/GM ointment 0.9 g (has no administration in time range)   ketorolac (TORADOL) injection 30 mg (30 mg Intramuscular Given 4/22/23 1610)        ED Course:         Labs:    Lab Results (last 24 hours)     ** No results found for the last 24 hours. **           Imaging:    XR Ribs Left With PA Chest    Result Date: 4/22/2023  PROCEDURE: XR RIBS LEFT W PA CHEST  COMPARISON: [facility], , CHEST PA/AP & LAT 2V, 12/14/2020, 14:23.  INDICATIONS: FELL FROM LADDER COMPLAINS OF MID BACK PAIN INTO POSTERIOR LEFT SIDED RIBS  FINDINGS:   The lungs are well-expanded. The heart and pulmonary vasculature are within normal limits. No pleural effusions are identified. There are no active appearing infiltrates.  No displaced left rib fractures are identified.  No evidence of pneumothorax.  IMPRESSION: No active disease.  RADHA OCHOA MD       Electronically Signed and Approved By: RADHA OCHOA MD on 4/22/2023 at 15:48             XR Spine Thoracic 3 View    Result Date: 4/22/2023  PROCEDURE: XR SPINE THORACIC 3 VW  COMPARISON: [facility], , CHEST PA/AP & LAT 2V, 12/14/2020, 14:23.  INDICATIONS: FELL FROM LADDER COMPLAINS OF MID BACK PAIN INTO POSTERIOR LEFT SIDED RIBS  FINDINGS:   Degenerative changes are present throughout the thoracic spine.  No evidence of acute fracture or subluxation.  There are no lytic or sclerotic lesions.  IMPRESSION:  Degenerative change.  No acute osseous abnormalities are identified.   RADHA OCHOA MD       Electronically Signed and Approved By: RADHA OCHOA MD on 4/22/2023 at 15:47             XR Femur 2 View Left    Result Date: 4/22/2023  PROCEDURE: XR FEMUR 2 VW LEFT  COMPARISON: [facility], CR, XR RIBS LEFT W PA CHEST, 4/22/2023, 15:17.  INDICATIONS: fall from ladder; pain/bruising to lateral L upper & lower leg  FINDINGS:   No fractures or acute osseous abnormalities are identified in the left femur.  There is soft tissue swelling laterally in the mid left thigh.  IMPRESSION: Soft tissue swelling.  No acute osseous abnormalities are identified in the left femur.   RADHA OCHOA MD       Electronically Signed and Approved By: RADHA OCHOA MD on 4/22/2023 at 15:50             XR Tibia Fibula 2 View Left    Result Date: 4/22/2023  PROCEDURE: XR TIBIA FIBULA 2 VW LEFT  COMPARISON: None  INDICATIONS: fall from ladder; pain/bruising to lateral L upper & lower leg  FINDINGS:   No fractures or acute osseous abnormalities are identified in the left lower leg.  IMPRESSION: No osseous abnormalities are identified in the left lower leg.   RADHA OCHOA MD       Electronically Signed and Approved By: RADHA OCHOA MD on 4/22/2023 at 15:51             CT Head Without Contrast    Result Date: 4/22/2023  PROCEDURE: CT HEAD WO CONTRAST  COMPARISON:  [facility], CT, CT SINUS WO CONTRAST, 2/22/2023, 16:58. INDICATIONS: eval left occipital hematoma (fall from 6 feet); concussion  PROTOCOL:   Standard imaging protocol performed    RADIATION:   DLP: 952.9 mGy*cm   MA and/or KV was adjusted to minimize radiation dose.     TECHNIQUE: Axial images of the head without intravenous contrast.  FINDINGS:  The ventricles have a normal size and configuration. There is no evidence of acute intracranial hemorrhage, mass or midline shift. No extra-axial fluid collections are identified. There are no skull  fractures. The visualized paranasal sinuses and mastoid air cells are grossly clear.  There are postoperative changes in the maxillary sinuses.  Mild posterior scalp contusion.  IMPRESSION:  Mild posterior scalp contusion.  No acute intracranial abnormalities are identified.  RADHA OCHOA MD       Electronically Signed and Approved By: RADHA OCHOA MD on 4/22/2023 at 15:53             CT Cervical Spine Without Contrast    Result Date: 4/22/2023  PROCEDURE: CT CERVICAL SPINE WO CONTRAST  COMPARISON: [facility], CT, CT SINUS WO CONTRAST, 2/22/2023, 16:58.  [facility], CR, C-SPINE 2 OR 3 VIEWS, 3/27/2012, 14:11.  [facility], CT, CT HEAD WO CONTRAST, 4/22/2023, 15:32.  INDICATIONS: Neck pain, acute, prior cervical surgery  PROTOCOL:   Standard imaging protocol performed    RADIATION:   DLP: 463.6 mGy*cm   MA and/or KV was adjusted to minimize radiation dose.     TECHNIQUE: Axial images of the cervical spine without intravenous contrast.  Reformatted images.  FINDINGS: Prior ACDF at C5-C6 and C6-C7.  The hardware is intact.  Disc degeneration is present at C4-C5 and C7-T1.  No evidence of acute fracture or subluxation.  There is osseous fusion of the right facets of C2 and C3.  There are no lytic or sclerotic lesions.  No evidence of paraspinal soft tissue mass or adenopathy..  IMPRESSION:  Postoperative and degenerative change.  No acute osseous abnormalities are identified in the cervical spine.  RADHA OCHOA MD       Electronically Signed and Approved By: RADHA OCHOA MD on 4/22/2023 at 15:57                 Differential Diagnosis and Discussion:    Extremity Pain: Differential diagnosis includes but is not limited to soft tissue sprain, tendonitis, tendon injury, dislocation, fracture, deep vein thrombosis, arterial insufficiency, osteoarthritis, bursitis, and ligamentous damage.  Laceration: Laceration was evaluated for arterial injury, ligamentous damage,  and other neurovascular injury.  Trauma:  Differential diagnosis considered but not limited to were subarachnoid hemorrhage, intracranial bleeding, pneumothorax, cardiac contusion, lung contusion, intra-abdominal bleeding, and compartment syndrome of any extremity or other significant traumatic pathology    All X-rays impressions were independently interpreted by me.  CT scan radiology impression was interpreted by me.    MDM               This patient is a pleasant 58-year-old female that was standing on a ladder cleaning off her carport roof when she lost her balance and fell to the ground below injuring the back of her head and causing neck pain and mid back pain and also bruising to the left ribs and left thigh and lower leg.    Plain films of the ribs were negative for fracture and plain films of the left femur and left tibia and fibula were negative.    Thoracic x-rays were negative for fracture.    CT scan of the head and cervical spine were negative for any acute findings.    She did have a hematoma over the left occipital area of the scalp and a small 1 cm laceration that we cleaned up and I approximated with a staple and we dressed with topical bacitracin ointment.    She was given instructions to have the staple taken out in 7 to 10 days.    She will prescribed a couple of days of pain medicine to use as needed and supportive care instructions were given.                    Patient Care Considerations:          Consultants/Shared Management Plan:        Social Determinants of Health:    Patient is independent, reliable, and has access to care.       Disposition and Care Coordination:    Discharged: The patient is suitable and stable for discharge with no need for consideration of observation or admission.    I have explained the patient´s condition, diagnoses and treatment plan based on the information available to me at this time. I have answered questions and addressed any concerns. The patient has a  good  understanding of the patient´s diagnosis, condition, and treatment plan as can be expected at this point. The vital signs have been stable. The patient´s condition is stable and appropriate for discharge from the emergency department.      The patient will pursue further outpatient evaluation with the primary care physician or other designated or consulting physician as outlined in the discharge instructions. They are agreeable to this plan of care and follow-up instructions have been explained in detail. The patient has received these instructions in written format and have expressed an understanding of the discharge instructions. The patient is aware that any significant change in condition or worsening of symptoms should prompt an immediate return to this or the closest emergency department or call to 911.  I have explained discharge medications and the need for follow up with the patient/caretakers. This was also printed in the discharge instructions. Patient was discharged with the following medications and follow up:      Medication List      Changed    * HYDROcodone-acetaminophen 7.5-325 MG per tablet  Commonly known as: NORCO  What changed: Another medication with the same name was added. Make sure you understand how and when to take each.     * HYDROcodone-acetaminophen 5-325 MG per tablet  Commonly known as: NORCO  Take 1 tablet by mouth Every 6 (Six) Hours As Needed for Severe Pain.  What changed: You were already taking a medication with the same name, and this prescription was added. Make sure you understand how and when to take each.         * This list has 2 medication(s) that are the same as other medications prescribed for you. Read the directions carefully, and ask your doctor or other care provider to review them with you.               Where to Get Your Medications      These medications were sent to [address] [phone]  PH - [phone] FX  [address] [facility] [place] [address]    Phone: [phone]   · HYDROcodone-acetaminophen 5-325 MG per tablet      Josephine Torres MD  [address] [place] [address] [phone]    Call in 3 days  As needed, If symptoms worsen, for a follow-up appointment       Final diagnoses:   Fall from ladder, initial encounter   Laceration of scalp, initial encounter   Hematoma of scalp, initial encounter   Acute strain of neck muscle, initial encounter   Contusion of left thigh, initial encounter   Contusion of mid back, initial encounter        ED Disposition     ED Disposition   Discharge    Condition   Stable    Comment   --             This medical record created using voice recognition software.             Lucas Landry  04/22/23 1173       Beau Oakes MD  04/22/23 3151

## 2023-10-10 ENCOUNTER — OFFICE VISIT (OUTPATIENT)
Dept: SLEEP MEDICINE | Facility: HOSPITAL | Age: 58
End: 2023-10-10
Payer: MEDICARE

## 2023-10-10 VITALS — HEIGHT: 64 IN | WEIGHT: 181.8 LBS | OXYGEN SATURATION: 94 % | HEART RATE: 70 BPM | BODY MASS INDEX: 31.04 KG/M2

## 2023-10-10 DIAGNOSIS — G47.419 NARCOLEPSY WITHOUT CATAPLEXY: ICD-10-CM

## 2023-10-10 DIAGNOSIS — G47.33 OSA ON CPAP: Primary | ICD-10-CM

## 2023-10-10 DIAGNOSIS — E66.9 OBESITY (BMI 30-39.9): ICD-10-CM

## 2023-10-10 PROCEDURE — G0463 HOSPITAL OUTPT CLINIC VISIT: HCPCS

## 2023-10-10 RX ORDER — IPRATROPIUM BROMIDE 42 UG/1
SPRAY, METERED NASAL
COMMUNITY
Start: 2023-09-29

## 2023-10-10 NOTE — PROGRESS NOTES
Follow Up Sleep Disorders Center Note     Chief Complaint:  DILCIA     Primary Care Physician: Josephine Torres MD    Interval History:   The patient is a 58 y.o. female  who was last seen in the sleep lab: 4/10/2023.  Sleep study from 2011 showed AHI 6.1 RDI 66.2.  Not a candidate for oral mandibular device or for inspire therapy.  Treated with CPAP however difficulty with dryness of the throat.  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  Treated with Adderall XR 20 mg every morning and 10 mg extended release prescription comes from his from Sleep center.  At last visit both prescriptions were switched over to back to sleep.  Patient is to get 90-day supplies from [facility]. 90-day supply of 10 mg immediate release was ordered on August 9, 2023 90-day supply of 20 mg extended release was ordered on August 9, 2023.  Presents today for 6-month follow-up.    Downloaded PAP Data Reviewed For Compliance:  DME is [employer].  Downloads between 7/12/2023 - 10/9/2023.  Average usage is 8 hours 53 minutes.  Average AHI is 6.1.  Average auto CPAP pressure is 7 cm H2O    I have reviewed the above results and compared them with the patient's last downloads and reviewed with the patient.    Review of Systems:    A complete review of systems was done and all were negative with the exception of morning headache anxiety postnasal drip    Social History:    Social History     Socioeconomic History    Marital status:     Number of children: 1    Years of education: College Graduate   Tobacco Use    Smoking status: Former     Packs/day: 0.50     Years: 15.00     Additional pack years: 0.00     Total pack years: 7.50     Types: Cigarettes    Smokeless tobacco: Never   Vaping Use    Vaping Use: Never used   Substance and Sexual Activity    Alcohol use: No    Drug use: Never    Sexual activity: Defer       Allergies:  Molds & smuts, Morphine and related,  "Sulfamethoxazole-trimethoprim, Pravastatin, Acetaminophen, Chicken meat (diagnostic), Ibuprofen, Statins, and Elavil [amitriptyline]     Medication Review:  Reviewed.      Vital Signs:    Vitals:    10/10/23 1100   Pulse: 70   SpO2: 94%   Weight: 82.5 kg (181 lb 12.8 oz)   Height: 162.6 cm (64.02\")     Body mass index is 31.19 kg/mý.    Physical Exam:    Constitutional:  Well developed 58 y.o. female that appears in no apparent distress.  Awake & oriented times 3.  Normal mood with normal recent and remote memory and normal judgement.  Eyes:  Conjunctivae normal.  Oropharynx: Previously, moist mucous membranes.    Self-administered [place] Sleepiness Scale test results:  6  0-5 Lower normal daytime sleepiness  6-10 Higher normal daytime sleepiness  11-12 Mild, 13-15 Moderate, & 16-24 Severe excessive daytime sleepiness    Impression:   1. DILCIA on CPAP    2. Narcolepsy without cataplexy    3. Obesity (BMI 30-39.9)        Obstructive sleep apnea adequately treated with CPAP 7 cm H2O. The patient appears to be at goal with good compliance and usage. The patient has no complaints of hypersomnolence.  Patient will call next month when she is due for refill; 90-day supply sent to [facility].  Plan for follow-up in April 2024.    Plan:  Good sleep hygiene measures should be maintained.  Weight loss would be beneficial in this patient who obese by Body mass index is 31.19 kg/mý..      After evaluating the patient and assessing results available, the patient is benefiting from the treatment being provided.     The patient will continue CPAP 7 cm H2O.  After clinical evaluation and review of downloads, I recommend no changes to the patient's pressures.  A new prescription will be sent to the patient's DME.    Caution during activities that require prolonged concentration is strongly advised if sleepiness returns. Changing of PAP supplies regularly is important for effective use. Patient needs to change cushion on the " mask or plugs on nasal pillows along with disposable filters once every month and change mask frame, tubing, headgear and Velcro straps every 6 months at the minimum.    I answered all of the patient's questions.  The patient will call for any problems and will follow up in April 2024.      Jens Nelson MD  Sleep Medicine  10/10/23  11:45 EDT

## 2023-11-10 DIAGNOSIS — G47.419 NARCOLEPSY WITHOUT CATAPLEXY: ICD-10-CM

## 2023-11-10 RX ORDER — DEXTROAMPHETAMINE SACCHARATE, AMPHETAMINE ASPARTATE MONOHYDRATE, DEXTROAMPHETAMINE SULFATE AND AMPHETAMINE SULFATE 5; 5; 5; 5 MG/1; MG/1; MG/1; MG/1
20 CAPSULE, EXTENDED RELEASE ORAL EVERY MORNING
Qty: 90 CAPSULE | Refills: 0 | Status: CANCELLED | OUTPATIENT
Start: 2023-11-10

## 2023-11-10 RX ORDER — DEXTROAMPHETAMINE SACCHARATE, AMPHETAMINE ASPARTATE, DEXTROAMPHETAMINE SULFATE AND AMPHETAMINE SULFATE 2.5; 2.5; 2.5; 2.5 MG/1; MG/1; MG/1; MG/1
TABLET ORAL
Qty: 90 TABLET | Refills: 0 | Status: CANCELLED | OUTPATIENT
Start: 2023-11-10

## 2023-11-10 NOTE — TELEPHONE ENCOUNTER
Patient called in for refill to be sent to [facility] pharmacy     - amphetamine-dextroamphetamine (Adderall) 10 MG tablet   - amphetamine-dextroamphetamine XR (ADDERALL XR) 20 MG 24 hr capsule

## 2023-11-13 DIAGNOSIS — G47.419 NARCOLEPSY WITHOUT CATAPLEXY: ICD-10-CM

## 2023-11-13 RX ORDER — DEXTROAMPHETAMINE SACCHARATE, AMPHETAMINE ASPARTATE, DEXTROAMPHETAMINE SULFATE AND AMPHETAMINE SULFATE 2.5; 2.5; 2.5; 2.5 MG/1; MG/1; MG/1; MG/1
TABLET ORAL
Qty: 90 TABLET | Refills: 0 | Status: SHIPPED | OUTPATIENT
Start: 2023-11-13

## 2023-11-13 RX ORDER — DEXTROAMPHETAMINE SACCHARATE, AMPHETAMINE ASPARTATE MONOHYDRATE, DEXTROAMPHETAMINE SULFATE AND AMPHETAMINE SULFATE 5; 5; 5; 5 MG/1; MG/1; MG/1; MG/1
20 CAPSULE, EXTENDED RELEASE ORAL EVERY MORNING
Qty: 90 CAPSULE | Refills: 0 | Status: SHIPPED | OUTPATIENT
Start: 2023-11-13

## 2023-12-08 ENCOUNTER — LAB REQUISITION (OUTPATIENT)
Dept: LAB | Facility: HOSPITAL | Age: 58
End: 2023-12-08
Payer: MEDICARE

## 2023-12-08 DIAGNOSIS — D83.9 COMMON VARIABLE IMMUNODEFICIENCY, UNSPECIFIED: ICD-10-CM

## 2023-12-08 LAB
ALBUMIN SERPL-MCNC: 4.4 G/DL (ref 3.5–5.2)
ALBUMIN/GLOB SERPL: 1.6 G/DL
ALP SERPL-CCNC: 167 U/L (ref 39–117)
ALT SERPL W P-5'-P-CCNC: 48 U/L (ref 1–33)
ANION GAP SERPL CALCULATED.3IONS-SCNC: 10.1 MMOL/L (ref 5–15)
AST SERPL-CCNC: 35 U/L (ref 1–32)
BACTERIA UR QL AUTO: ABNORMAL /HPF
BASOPHILS # BLD AUTO: 0.01 10*3/MM3 (ref 0–0.2)
BASOPHILS NFR BLD AUTO: 0.1 % (ref 0–1.5)
BILIRUB SERPL-MCNC: 0.3 MG/DL (ref 0–1.2)
BILIRUB UR QL STRIP: NEGATIVE
BUN SERPL-MCNC: 13 MG/DL (ref 8–23)
BUN/CREAT SERPL: 18.1 (ref 7–25)
CALCIUM SPEC-SCNC: 9.6 MG/DL (ref 8.6–10.5)
CHLORIDE SERPL-SCNC: 97 MMOL/L (ref 98–107)
CLARITY UR: CLEAR
CO2 SERPL-SCNC: 27.9 MMOL/L (ref 22–29)
COLOR UR: YELLOW
CREAT SERPL-MCNC: 0.72 MG/DL (ref 0.57–1)
DEPRECATED RDW RBC AUTO: 42.8 FL (ref 37–54)
EGFRCR SERPLBLD CKD-EPI 2021: 95.9 ML/MIN/1.73
EOSINOPHIL # BLD AUTO: 0.04 10*3/MM3 (ref 0–0.4)
EOSINOPHIL NFR BLD AUTO: 0.5 % (ref 0.3–6.2)
ERYTHROCYTE [DISTWIDTH] IN BLOOD BY AUTOMATED COUNT: 13 % (ref 12.3–15.4)
GLOBULIN UR ELPH-MCNC: 2.7 GM/DL
GLUCOSE SERPL-MCNC: 101 MG/DL (ref 65–99)
GLUCOSE UR STRIP-MCNC: NEGATIVE MG/DL
HCT VFR BLD AUTO: 41.2 % (ref 34–46.6)
HGB BLD-MCNC: 13.7 G/DL (ref 12–15.9)
HGB UR QL STRIP.AUTO: NEGATIVE
HYALINE CASTS UR QL AUTO: ABNORMAL /LPF
IGA1 MFR SER: <50 MG/DL (ref 70–400)
IGG1 SER-MCNC: 948 MG/DL (ref 700–1600)
IGM SERPL-MCNC: 70 MG/DL (ref 40–230)
IMM GRANULOCYTES # BLD AUTO: 0.01 10*3/MM3 (ref 0–0.05)
IMM GRANULOCYTES NFR BLD AUTO: 0.1 % (ref 0–0.5)
KETONES UR QL STRIP: NEGATIVE
LEUKOCYTE ESTERASE UR QL STRIP.AUTO: ABNORMAL
LYMPHOCYTES # BLD AUTO: 2.92 10*3/MM3 (ref 0.7–3.1)
LYMPHOCYTES NFR BLD AUTO: 37.7 % (ref 19.6–45.3)
MCH RBC QN AUTO: 30 PG (ref 26.6–33)
MCHC RBC AUTO-ENTMCNC: 33.3 G/DL (ref 31.5–35.7)
MCV RBC AUTO: 90.4 FL (ref 79–97)
MONOCYTES # BLD AUTO: 0.42 10*3/MM3 (ref 0.1–0.9)
MONOCYTES NFR BLD AUTO: 5.4 % (ref 5–12)
NEUTROPHILS NFR BLD AUTO: 4.34 10*3/MM3 (ref 1.7–7)
NEUTROPHILS NFR BLD AUTO: 56.2 % (ref 42.7–76)
NITRITE UR QL STRIP: NEGATIVE
NRBC BLD AUTO-RTO: 0 /100 WBC (ref 0–0.2)
PH UR STRIP.AUTO: 6.5 [PH] (ref 5–8)
PLATELET # BLD AUTO: 270 10*3/MM3 (ref 140–450)
PMV BLD AUTO: 11.7 FL (ref 6–12)
POTASSIUM SERPL-SCNC: 3.5 MMOL/L (ref 3.5–5.2)
PROT SERPL-MCNC: 7.1 G/DL (ref 6–8.5)
PROT UR QL STRIP: NEGATIVE
RBC # BLD AUTO: 4.56 10*6/MM3 (ref 3.77–5.28)
RBC # UR STRIP: ABNORMAL /HPF
REF LAB TEST METHOD: ABNORMAL
SODIUM SERPL-SCNC: 135 MMOL/L (ref 136–145)
SP GR UR STRIP: 1.01 (ref 1–1.03)
SQUAMOUS #/AREA URNS HPF: ABNORMAL /HPF
UROBILINOGEN UR QL STRIP: ABNORMAL
WBC # UR STRIP: ABNORMAL /HPF
WBC NRBC COR # BLD AUTO: 7.74 10*3/MM3 (ref 3.4–10.8)

## 2023-12-08 PROCEDURE — 81001 URINALYSIS AUTO W/SCOPE: CPT | Performed by: ALLERGY & IMMUNOLOGY

## 2023-12-08 PROCEDURE — 82785 ASSAY OF IGE: CPT | Performed by: ALLERGY & IMMUNOLOGY

## 2023-12-08 PROCEDURE — 85025 COMPLETE CBC W/AUTO DIFF WBC: CPT | Performed by: ALLERGY & IMMUNOLOGY

## 2023-12-08 PROCEDURE — 80053 COMPREHEN METABOLIC PANEL: CPT | Performed by: ALLERGY & IMMUNOLOGY

## 2023-12-08 PROCEDURE — 82787 IGG 1 2 3 OR 4 EACH: CPT | Performed by: ALLERGY & IMMUNOLOGY

## 2023-12-08 PROCEDURE — 86317 IMMUNOASSAY INFECTIOUS AGENT: CPT | Performed by: ALLERGY & IMMUNOLOGY

## 2023-12-08 PROCEDURE — 82784 ASSAY IGA/IGD/IGG/IGM EACH: CPT | Performed by: ALLERGY & IMMUNOLOGY

## 2023-12-11 LAB
IGG SERPL-MCNC: 912 MG/DL (ref 586–1602)
IGG1 SER-MCNC: 463 MG/DL (ref 248–810)
IGG2 SER-MCNC: 494 MG/DL (ref 130–555)
IGG3 SER-MCNC: 47 MG/DL (ref 15–102)
IGG4 SER-MCNC: 11 MG/DL (ref 2–96)

## 2023-12-13 LAB — IGE SERPL-ACNC: 8 IU/ML (ref 6–495)

## 2023-12-15 LAB
S PN DA SERO 19F IGG SER IA-MCNC: 15.8 UG/ML
S PNEUM DA 1 IGG SER IA-MCNC: 3.6 UG/ML
S PNEUM DA 10A IGG SER IA-MCNC: 8.8 UG/ML
S PNEUM DA 11A IGG SER IA-MCNC: 2.9 UG/ML
S PNEUM DA 12F IGG SER IA-MCNC: 3.1 UG/ML
S PNEUM DA 14 IGG SER IA-MCNC: >18.7 UG/ML
S PNEUM DA 15B IGG SER IA-MCNC: 7.2 UG/ML
S PNEUM DA 17F IGG SER IA-MCNC: 6.8 UG/ML
S PNEUM DA 18C IGG SER IA-MCNC: >8.1 UG/ML
S PNEUM DA 19A IGG SER IA-MCNC: 9.1 UG/ML
S PNEUM DA 2 IGG SER IA-MCNC: 10.2 UG/ML
S PNEUM DA 20A IGG SER IA-MCNC: 18.8 UG/ML
S PNEUM DA 22F IGG SER IA-MCNC: 5 UG/ML
S PNEUM DA 23F IGG SER IA-MCNC: 9.3 UG/ML
S PNEUM DA 3 IGG SER IA-MCNC: 1.5 UG/ML
S PNEUM DA 33F IGG SER IA-MCNC: >10.1 UG/ML
S PNEUM DA 4 IGG SER IA-MCNC: 3.2 UG/ML
S PNEUM DA 5 IGG SER IA-MCNC: 10.9 UG/ML
S PNEUM DA 6B IGG SER IA-MCNC: 5.3 UG/ML
S PNEUM DA 7F IGG SER IA-MCNC: 10.3 UG/ML
S PNEUM DA 8 IGG SER IA-MCNC: 3.3 UG/ML
S PNEUM DA 9N IGG SER IA-MCNC: 3.5 UG/ML
S PNEUM DA 9V IGG SER IA-MCNC: 2.5 UG/ML

## 2024-01-04 ENCOUNTER — TRANSCRIBE ORDERS (OUTPATIENT)
Dept: ADMINISTRATIVE | Facility: HOSPITAL | Age: 59
End: 2024-01-04
Payer: MEDICARE

## 2024-01-04 DIAGNOSIS — D83.9 COMMON VARIABLE IMMUNODEFICIENCY: Primary | ICD-10-CM

## 2024-01-05 ENCOUNTER — TELEPHONE (OUTPATIENT)
Dept: PHARMACY | Facility: HOSPITAL | Age: 59
End: 2024-01-05
Payer: MEDICARE

## 2024-01-05 NOTE — TELEPHONE ENCOUNTER
Called  and spoke with Hilton at [facility] regarding order for IVIG.  Informed her that the order was for Gammagard. Informed her that Gamunex C is our formulary product. Inquired if we could get the order changed.Also inquired about the frequency of 28 weeks. Inquired if it was supposed to be every 28 days/4 weeks.She said it was supposed to be every 4 weeks. She said they would resend the order for Gamunex C every 4 week.

## 2024-01-10 DIAGNOSIS — D83.9 COMMON VARIABLE IMMUNODEFICIENCY: Primary | ICD-10-CM

## 2024-01-15 ENCOUNTER — HOSPITAL ENCOUNTER (OUTPATIENT)
Dept: INFUSION THERAPY | Facility: HOSPITAL | Age: 59
Discharge: HOME | End: 2024-01-15
Admitting: ALLERGY & IMMUNOLOGY
Payer: MEDICARE

## 2024-01-15 ENCOUNTER — TRANSCRIBE ORDERS (OUTPATIENT)
Dept: ADMINISTRATIVE | Facility: HOSPITAL | Age: 59
End: 2024-01-15
Payer: MEDICARE

## 2024-01-15 ENCOUNTER — APPOINTMENT (OUTPATIENT)
Dept: LAB | Facility: HOSPITAL | Age: 59
End: 2024-01-15
Payer: MEDICARE

## 2024-01-15 VITALS
DIASTOLIC BLOOD PRESSURE: 85 MMHG | TEMPERATURE: 97.7 F | BODY MASS INDEX: 30.52 KG/M2 | WEIGHT: 178.79 LBS | OXYGEN SATURATION: 98 % | HEIGHT: 64 IN | SYSTOLIC BLOOD PRESSURE: 138 MMHG | RESPIRATION RATE: 20 BRPM | HEART RATE: 72 BPM

## 2024-01-15 DIAGNOSIS — D83.9 COMMON VARIABLE IMMUNODEFICIENCY: Primary | ICD-10-CM

## 2024-01-15 PROCEDURE — 25010000002 IMMUNE GLOBULIN (HUMAN) 5 GM/50ML SOLUTION 50 ML VIAL: Performed by: ALLERGY & IMMUNOLOGY

## 2024-01-15 PROCEDURE — 96366 THER/PROPH/DIAG IV INF ADDON: CPT

## 2024-01-15 PROCEDURE — 25010000002 IMMUNE GLOBULIN (HUMAN) 20 GM/200ML SOLUTION 200 ML VIAL: Performed by: ALLERGY & IMMUNOLOGY

## 2024-01-15 PROCEDURE — 25010000002 IMMUNE GLOBULIN (HUMAN) 10 GM/100ML SOLUTION 100 ML VIAL: Performed by: ALLERGY & IMMUNOLOGY

## 2024-01-15 PROCEDURE — 96365 THER/PROPH/DIAG IV INF INIT: CPT

## 2024-01-15 RX ADMIN — IMMUNE GLOBULIN (HUMAN) 35 G: 10 INJECTION INTRAVENOUS; SUBCUTANEOUS at 10:06

## 2024-02-07 ENCOUNTER — TELEPHONE (OUTPATIENT)
Dept: PHARMACY | Facility: HOSPITAL | Age: 59
End: 2024-02-07
Payer: MEDICARE

## 2024-02-07 DIAGNOSIS — G47.419 NARCOLEPSY WITHOUT CATAPLEXY: ICD-10-CM

## 2024-02-07 RX ORDER — DEXTROAMPHETAMINE SACCHARATE, AMPHETAMINE ASPARTATE MONOHYDRATE, DEXTROAMPHETAMINE SULFATE AND AMPHETAMINE SULFATE 5; 5; 5; 5 MG/1; MG/1; MG/1; MG/1
20 CAPSULE, EXTENDED RELEASE ORAL EVERY MORNING
Qty: 90 CAPSULE | Refills: 0 | Status: SHIPPED | OUTPATIENT
Start: 2024-02-07

## 2024-02-07 RX ORDER — DEXTROAMPHETAMINE SACCHARATE, AMPHETAMINE ASPARTATE, DEXTROAMPHETAMINE SULFATE AND AMPHETAMINE SULFATE 2.5; 2.5; 2.5; 2.5 MG/1; MG/1; MG/1; MG/1
TABLET ORAL
Qty: 90 TABLET | Refills: 0 | Status: SHIPPED | OUTPATIENT
Start: 2024-02-07

## 2024-02-07 NOTE — TELEPHONE ENCOUNTER
Called [facility] and spoke with Concepción.  Inquired if we could get patients monthly medical necessity note and IVIG order sent. Also asked if we could get order clarified as they never sent clarification last month. Order still says every 28 weeks instead of 28 days or 4 weeks.  She said the order is definitely supposed to say every 4 weeks. Said she would get order changed and signed and get medical necessity and order sent over right away. Gave her fax and phone number.

## 2024-02-12 ENCOUNTER — HOSPITAL ENCOUNTER (OUTPATIENT)
Dept: INFUSION THERAPY | Facility: HOSPITAL | Age: 59
Discharge: HOME | End: 2024-02-12
Admitting: ALLERGY & IMMUNOLOGY
Payer: MEDICARE

## 2024-02-12 VITALS
OXYGEN SATURATION: 99 % | DIASTOLIC BLOOD PRESSURE: 83 MMHG | HEART RATE: 60 BPM | WEIGHT: 180.34 LBS | BODY MASS INDEX: 30.79 KG/M2 | RESPIRATION RATE: 18 BRPM | HEIGHT: 64 IN | TEMPERATURE: 97.4 F | SYSTOLIC BLOOD PRESSURE: 132 MMHG

## 2024-02-12 DIAGNOSIS — D83.9 COMMON VARIABLE IMMUNODEFICIENCY: Primary | ICD-10-CM

## 2024-02-12 LAB
ALBUMIN SERPL-MCNC: 4.3 G/DL (ref 3.5–5.2)
ALBUMIN/GLOB SERPL: 1.5 G/DL
ALP SERPL-CCNC: 148 U/L (ref 39–117)
ALT SERPL W P-5'-P-CCNC: 28 U/L (ref 1–33)
ANION GAP SERPL CALCULATED.3IONS-SCNC: 11.3 MMOL/L (ref 5–15)
AST SERPL-CCNC: 25 U/L (ref 1–32)
BASOPHILS # BLD AUTO: 0.01 10*3/MM3 (ref 0–0.2)
BASOPHILS NFR BLD AUTO: 0.2 % (ref 0–1.5)
BILIRUB SERPL-MCNC: 0.5 MG/DL (ref 0–1.2)
BUN SERPL-MCNC: 14 MG/DL (ref 8–23)
BUN/CREAT SERPL: 18.9 (ref 7–25)
CALCIUM SPEC-SCNC: 9.8 MG/DL (ref 8.6–10.5)
CHLORIDE SERPL-SCNC: 102 MMOL/L (ref 98–107)
CO2 SERPL-SCNC: 26.7 MMOL/L (ref 22–29)
CREAT SERPL-MCNC: 0.74 MG/DL (ref 0.57–1)
DEPRECATED RDW RBC AUTO: 42.3 FL (ref 37–54)
EGFRCR SERPLBLD CKD-EPI 2021: 92.8 ML/MIN/1.73
EOSINOPHIL # BLD AUTO: 0.06 10*3/MM3 (ref 0–0.4)
EOSINOPHIL NFR BLD AUTO: 1 % (ref 0.3–6.2)
ERYTHROCYTE [DISTWIDTH] IN BLOOD BY AUTOMATED COUNT: 13 % (ref 12.3–15.4)
GLOBULIN UR ELPH-MCNC: 2.8 GM/DL
GLUCOSE SERPL-MCNC: 99 MG/DL (ref 65–99)
HCT VFR BLD AUTO: 40.9 % (ref 34–46.6)
HGB BLD-MCNC: 13.6 G/DL (ref 12–15.9)
IMM GRANULOCYTES # BLD AUTO: 0.02 10*3/MM3 (ref 0–0.05)
IMM GRANULOCYTES NFR BLD AUTO: 0.3 % (ref 0–0.5)
LYMPHOCYTES # BLD AUTO: 2.44 10*3/MM3 (ref 0.7–3.1)
LYMPHOCYTES NFR BLD AUTO: 40.1 % (ref 19.6–45.3)
MCH RBC QN AUTO: 29.6 PG (ref 26.6–33)
MCHC RBC AUTO-ENTMCNC: 33.3 G/DL (ref 31.5–35.7)
MCV RBC AUTO: 88.9 FL (ref 79–97)
MONOCYTES # BLD AUTO: 0.38 10*3/MM3 (ref 0.1–0.9)
MONOCYTES NFR BLD AUTO: 6.3 % (ref 5–12)
NEUTROPHILS NFR BLD AUTO: 3.17 10*3/MM3 (ref 1.7–7)
NEUTROPHILS NFR BLD AUTO: 52.1 % (ref 42.7–76)
NRBC BLD AUTO-RTO: 0 /100 WBC (ref 0–0.2)
PLATELET # BLD AUTO: 269 10*3/MM3 (ref 140–450)
PMV BLD AUTO: 10.9 FL (ref 6–12)
POTASSIUM SERPL-SCNC: 3.9 MMOL/L (ref 3.5–5.2)
PROT SERPL-MCNC: 7.1 G/DL (ref 6–8.5)
RBC # BLD AUTO: 4.6 10*6/MM3 (ref 3.77–5.28)
SODIUM SERPL-SCNC: 140 MMOL/L (ref 136–145)
WBC NRBC COR # BLD AUTO: 6.08 10*3/MM3 (ref 3.4–10.8)

## 2024-02-12 PROCEDURE — 80053 COMPREHEN METABOLIC PANEL: CPT | Performed by: ALLERGY & IMMUNOLOGY

## 2024-02-12 PROCEDURE — 25010000002 IMMUNE GLOBULIN (HUMAN) 10 GM/100ML SOLUTION 100 ML VIAL: Performed by: ALLERGY & IMMUNOLOGY

## 2024-02-12 PROCEDURE — 85025 COMPLETE CBC W/AUTO DIFF WBC: CPT | Performed by: ALLERGY & IMMUNOLOGY

## 2024-02-12 PROCEDURE — 25010000002 IMMUNE GLOBULIN (HUMAN) 20 GM/200ML SOLUTION 200 ML VIAL: Performed by: ALLERGY & IMMUNOLOGY

## 2024-02-12 PROCEDURE — 96366 THER/PROPH/DIAG IV INF ADDON: CPT

## 2024-02-12 PROCEDURE — 25010000002 IMMUNE GLOBULIN (HUMAN) 5 GM/50ML SOLUTION 50 ML VIAL: Performed by: ALLERGY & IMMUNOLOGY

## 2024-02-12 PROCEDURE — 96365 THER/PROPH/DIAG IV INF INIT: CPT

## 2024-02-12 RX ADMIN — IMMUNE GLOBULIN (HUMAN) 35 G: 10 INJECTION INTRAVENOUS; SUBCUTANEOUS at 10:43

## 2024-03-07 ENCOUNTER — TELEPHONE (OUTPATIENT)
Dept: OTOLARYNGOLOGY | Facility: CLINIC | Age: 59
End: 2024-03-07
Payer: MEDICARE

## 2024-03-07 NOTE — TELEPHONE ENCOUNTER
Caller:  TERA SHELTON    Relationship: [unfilled] SELF    Best call back number: [phone]    What is your medical concern? PT CALLED FOR AN APPT FOR HARD SCOPE OF THE SINUS.  NEXT AVAILABLE IS 08.07.2024. PT WANTED DR CURTIS TO BE AWARE. HER IMMUNE SYSTEM IS MUCH WEAKER.PLEASE CALL WITH ANY CONCERNS. THANK YOU

## 2024-03-19 ENCOUNTER — HOSPITAL ENCOUNTER (OUTPATIENT)
Dept: INFUSION THERAPY | Facility: HOSPITAL | Age: 59
Discharge: HOME | End: 2024-03-19
Admitting: ALLERGY & IMMUNOLOGY
Payer: MEDICARE

## 2024-03-19 VITALS
TEMPERATURE: 98.1 F | HEART RATE: 80 BPM | RESPIRATION RATE: 20 BRPM | SYSTOLIC BLOOD PRESSURE: 143 MMHG | OXYGEN SATURATION: 97 % | WEIGHT: 180.56 LBS | HEIGHT: 64 IN | DIASTOLIC BLOOD PRESSURE: 91 MMHG | BODY MASS INDEX: 30.83 KG/M2

## 2024-03-19 DIAGNOSIS — D83.9 COMMON VARIABLE IMMUNODEFICIENCY: Primary | ICD-10-CM

## 2024-03-19 PROCEDURE — 25010000002 IMMUNE GLOBULIN (HUMAN) 5 GM/50ML SOLUTION 50 ML VIAL: Performed by: INTERNAL MEDICINE

## 2024-03-19 PROCEDURE — 25010000002 IMMUNE GLOBULIN (HUMAN) 20 GM/200ML SOLUTION 200 ML VIAL: Performed by: INTERNAL MEDICINE

## 2024-03-19 PROCEDURE — 96366 THER/PROPH/DIAG IV INF ADDON: CPT

## 2024-03-19 PROCEDURE — 25010000002 IMMUNE GLOBULIN (HUMAN) 10 GM/100ML SOLUTION 100 ML VIAL: Performed by: INTERNAL MEDICINE

## 2024-03-19 PROCEDURE — 96365 THER/PROPH/DIAG IV INF INIT: CPT

## 2024-03-19 RX ADMIN — IMMUNE GLOBULIN (HUMAN) 35 G: 10 INJECTION INTRAVENOUS; SUBCUTANEOUS at 10:22

## 2024-04-16 ENCOUNTER — HOSPITAL ENCOUNTER (OUTPATIENT)
Dept: INFUSION THERAPY | Facility: HOSPITAL | Age: 59
Discharge: HOME | End: 2024-04-16
Admitting: ALLERGY & IMMUNOLOGY
Payer: MEDICARE

## 2024-04-16 VITALS
OXYGEN SATURATION: 99 % | TEMPERATURE: 98.3 F | WEIGHT: 181 LBS | RESPIRATION RATE: 20 BRPM | SYSTOLIC BLOOD PRESSURE: 153 MMHG | HEART RATE: 65 BPM | DIASTOLIC BLOOD PRESSURE: 83 MMHG | HEIGHT: 64 IN | BODY MASS INDEX: 30.9 KG/M2

## 2024-04-16 DIAGNOSIS — D83.9 COMMON VARIABLE IMMUNODEFICIENCY: Primary | ICD-10-CM

## 2024-04-16 PROCEDURE — 96366 THER/PROPH/DIAG IV INF ADDON: CPT

## 2024-04-16 PROCEDURE — 25010000002 IMMUNE GLOBULIN (HUMAN) 10 GM/100ML SOLUTION 100 ML VIAL: Performed by: ALLERGY & IMMUNOLOGY

## 2024-04-16 PROCEDURE — 96365 THER/PROPH/DIAG IV INF INIT: CPT

## 2024-04-16 PROCEDURE — 25010000002 IMMUNE GLOBULIN (HUMAN) 5 GM/50ML SOLUTION 50 ML VIAL: Performed by: ALLERGY & IMMUNOLOGY

## 2024-04-16 PROCEDURE — 25010000002 IMMUNE GLOBULIN (HUMAN) 20 GM/200ML SOLUTION 200 ML VIAL: Performed by: ALLERGY & IMMUNOLOGY

## 2024-04-16 RX ADMIN — IMMUNE GLOBULIN (HUMAN) 35 G: 10 INJECTION INTRAVENOUS; SUBCUTANEOUS at 09:41

## 2024-04-18 ENCOUNTER — OFFICE VISIT (OUTPATIENT)
Dept: SLEEP MEDICINE | Facility: HOSPITAL | Age: 59
End: 2024-04-18
Payer: MEDICARE

## 2024-04-18 VITALS — OXYGEN SATURATION: 97 % | WEIGHT: 178.4 LBS | HEART RATE: 74 BPM | BODY MASS INDEX: 30.46 KG/M2 | HEIGHT: 64 IN

## 2024-04-18 DIAGNOSIS — G47.33 OBSTRUCTIVE SLEEP APNEA: Primary | ICD-10-CM

## 2024-04-18 DIAGNOSIS — G47.419 NARCOLEPSY WITHOUT CATAPLEXY: ICD-10-CM

## 2024-04-18 DIAGNOSIS — E66.9 OBESITY (BMI 30-39.9): ICD-10-CM

## 2024-04-18 PROCEDURE — 1159F MED LIST DOCD IN RCRD: CPT | Performed by: FAMILY MEDICINE

## 2024-04-18 PROCEDURE — G0463 HOSPITAL OUTPT CLINIC VISIT: HCPCS

## 2024-04-18 PROCEDURE — 1160F RVW MEDS BY RX/DR IN RCRD: CPT | Performed by: FAMILY MEDICINE

## 2024-04-18 RX ORDER — OLOPATADINE HYDROCHLORIDE 1 MG/ML
SOLUTION/ DROPS OPHTHALMIC
COMMUNITY
Start: 2024-03-14

## 2024-04-18 RX ORDER — DEXTROAMPHETAMINE SACCHARATE, AMPHETAMINE ASPARTATE MONOHYDRATE, DEXTROAMPHETAMINE SULFATE AND AMPHETAMINE SULFATE 5; 5; 5; 5 MG/1; MG/1; MG/1; MG/1
20 CAPSULE, EXTENDED RELEASE ORAL EVERY MORNING
Qty: 90 CAPSULE | Refills: 0 | Status: SHIPPED | OUTPATIENT
Start: 2024-04-18

## 2024-04-18 RX ORDER — CYANOCOBALAMIN 1000 UG/ML
INJECTION, SOLUTION INTRAMUSCULAR; SUBCUTANEOUS
COMMUNITY
Start: 2024-01-30

## 2024-04-18 RX ORDER — FEXOFENADINE HCL 180 MG/1
TABLET ORAL
COMMUNITY
Start: 2024-04-10

## 2024-04-18 RX ORDER — SODIUM CHLORIDE 0.65 %
AEROSOL, SPRAY (ML) NASAL
COMMUNITY
Start: 2024-01-22

## 2024-04-18 RX ORDER — DEXTROAMPHETAMINE SACCHARATE, AMPHETAMINE ASPARTATE, DEXTROAMPHETAMINE SULFATE AND AMPHETAMINE SULFATE 2.5; 2.5; 2.5; 2.5 MG/1; MG/1; MG/1; MG/1
TABLET ORAL
Qty: 90 TABLET | Refills: 0 | Status: SHIPPED | OUTPATIENT
Start: 2024-04-18

## 2024-04-18 NOTE — PROGRESS NOTES
"Follow Up Sleep Disorders Center Note     Chief Complaint:  DILCIA     Primary Care Physician: Mike Keen MD    Interval History:   The patient is a 59 y.o. female  who was last seen in the sleep lab: 10/10/2023.  Presents today for 6-month follow-up on DILCIA and narcolepsy without cataplexy. Sleep study from 2011 showed AHI 6.1 RDI 66.2.  Not a candidate for oral mandibular device or for inspire therapy.  Treated with CPAP however difficulty with dryness of the throat.  Also diagnosed with narcolepsy without cataplexy; sleep study done at [facility].  Treated with Adderall XR 20 mg every morning and 10 mg extended release.  Patient is to get 90-day supplies from [facility].    DME: [facility]; Data range: 1/18/2024 - 4/17/2024; Average usage: 8 hours 16 minutes; Average AHI: 6.6, Average PAP pressure: 7 cm H2O.    Review of Systems:    A complete review of systems was done and all were negative with the exception of morning headache postnasal drip dry mouth nasal congestion    Social History:    Social History     Socioeconomic History    Marital status:     Number of children: 1    Years of education: College Graduate   Tobacco Use    Smoking status: Former     Current packs/day: 0.50     Average packs/day: 0.5 packs/day for 15.0 years (7.5 ttl pk-yrs)     Types: Cigarettes    Smokeless tobacco: Never   Vaping Use    Vaping status: Never Used   Substance and Sexual Activity    Alcohol use: No    Drug use: Never    Sexual activity: Defer       Allergies:  Molds & smuts, Morphine and related, Statins, Sulfamethoxazole-trimethoprim, Pravastatin, Acetaminophen, Chicken meat (diagnostic), Elavil [amitriptyline], and Ibuprofen     Medication Review:  Reviewed.      Vital Signs:    Vitals:    04/18/24 0900   Pulse: 74   SpO2: 97%   Weight: 80.9 kg (178 lb 6.4 oz)   Height: 162.6 cm (64.02\")     Body mass index is 30.61 kg/m².    Physical Exam:    Constitutional:  Well developed 59 y.o. female that appears " in no apparent distress.  Awake & oriented times 3.  Normal mood with normal recent and remote memory and normal judgement.  Eyes:  Conjunctivae normal.  Oropharynx: Previously, moist mucous membranes.    Self-administered [place] Sleepiness Scale test results: 5   0-5 Lower normal daytime sleepiness  6-10 Higher normal daytime sleepiness  11-12 Mild, 13-15 Moderate, & 16-24 Severe excessive daytime sleepiness    Impression:   1. Obstructive sleep apnea    2. Narcolepsy without cataplexy    3. Obesity (BMI 30-39.9)        DILCIA adequately under control with CPAP set pressure of 7 cm H2O with good compliance and usage.  Some variability in AHI.  Will change pressure to 8 with help residual AHI.  Patient okay to continue Adderall 20 mg XR every morning and Adderall 10 mg immediate release every afternoon.  Refill order placed today.    Plan:  Good sleep hygiene measures should be maintained.  Weight loss would be beneficial in this patient who obese by Body mass index is 30.61 kg/m²..      After evaluating the patient and assessing results available, the patient is benefiting from the treatment being provided.     The patient will continue CPAP.  After clinical evaluation and review of downloads, I recommend no changes to the patient's pressures.  A new prescription will be sent to the patient's DME.    Caution during activities that require prolonged concentration is strongly advised if sleepiness returns. Changing of PAP supplies regularly is important for effective use. Patient needs to change cushion on the mask or plugs on nasal pillows along with disposable filters once every month and change mask frame, tubing, headgear and Velcro straps every 6 months at the minimum.    I answered all of the patient's questions.  The patient will call for any problems and will follow up in 6 months.      Jens Nelson MD  Sleep Medicine  04/18/24  13:50 EDT

## 2024-04-18 NOTE — ADDENDUM NOTE
Addended by: BRITTANY PETERS on: 4/18/2024 01:52 PM     Modules accepted: Orders, Level of Service

## 2024-04-29 ENCOUNTER — TRANSCRIBE ORDERS (OUTPATIENT)
Dept: ADMINISTRATIVE | Facility: HOSPITAL | Age: 59
End: 2024-04-29
Payer: MEDICARE

## 2024-04-29 ENCOUNTER — HOSPITAL ENCOUNTER (OUTPATIENT)
Dept: GENERAL RADIOLOGY | Facility: HOSPITAL | Age: 59
Discharge: HOME | End: 2024-04-29
Payer: MEDICARE

## 2024-04-29 DIAGNOSIS — M25.531 RIGHT WRIST PAIN: Primary | ICD-10-CM

## 2024-04-29 DIAGNOSIS — M25.531 RIGHT WRIST PAIN: ICD-10-CM

## 2024-04-29 PROCEDURE — 73110 X-RAY EXAM OF WRIST: CPT

## 2024-05-10 DIAGNOSIS — G47.419 NARCOLEPSY WITHOUT CATAPLEXY: ICD-10-CM

## 2024-05-10 RX ORDER — DEXTROAMPHETAMINE SACCHARATE, AMPHETAMINE ASPARTATE, DEXTROAMPHETAMINE SULFATE AND AMPHETAMINE SULFATE 2.5; 2.5; 2.5; 2.5 MG/1; MG/1; MG/1; MG/1
TABLET ORAL
Qty: 90 TABLET | Refills: 0 | Status: CANCELLED | OUTPATIENT
Start: 2024-05-10

## 2024-05-10 RX ORDER — DEXTROAMPHETAMINE SACCHARATE, AMPHETAMINE ASPARTATE MONOHYDRATE, DEXTROAMPHETAMINE SULFATE AND AMPHETAMINE SULFATE 5; 5; 5; 5 MG/1; MG/1; MG/1; MG/1
20 CAPSULE, EXTENDED RELEASE ORAL EVERY MORNING
Qty: 90 CAPSULE | Refills: 0 | Status: CANCELLED | OUTPATIENT
Start: 2024-05-10

## 2024-05-13 DIAGNOSIS — G47.419 NARCOLEPSY WITHOUT CATAPLEXY: ICD-10-CM

## 2024-05-13 RX ORDER — DEXTROAMPHETAMINE SACCHARATE, AMPHETAMINE ASPARTATE, DEXTROAMPHETAMINE SULFATE AND AMPHETAMINE SULFATE 2.5; 2.5; 2.5; 2.5 MG/1; MG/1; MG/1; MG/1
10 TABLET ORAL
Qty: 30 TABLET | Refills: 0 | Status: SHIPPED | OUTPATIENT
Start: 2024-05-13

## 2024-05-13 RX ORDER — DEXTROAMPHETAMINE SACCHARATE, AMPHETAMINE ASPARTATE MONOHYDRATE, DEXTROAMPHETAMINE SULFATE AND AMPHETAMINE SULFATE 5; 5; 5; 5 MG/1; MG/1; MG/1; MG/1
20 CAPSULE, EXTENDED RELEASE ORAL EVERY MORNING
Qty: 30 CAPSULE | Refills: 0 | Status: SHIPPED | OUTPATIENT
Start: 2024-05-13

## 2024-05-14 ENCOUNTER — HOSPITAL ENCOUNTER (OUTPATIENT)
Dept: INFUSION THERAPY | Facility: HOSPITAL | Age: 59
Discharge: HOME | End: 2024-05-14
Admitting: ALLERGY & IMMUNOLOGY
Payer: MEDICARE

## 2024-05-14 VITALS
DIASTOLIC BLOOD PRESSURE: 68 MMHG | RESPIRATION RATE: 18 BRPM | OXYGEN SATURATION: 99 % | HEIGHT: 64 IN | TEMPERATURE: 98 F | BODY MASS INDEX: 30.22 KG/M2 | WEIGHT: 177.03 LBS | HEART RATE: 64 BPM | SYSTOLIC BLOOD PRESSURE: 126 MMHG

## 2024-05-14 DIAGNOSIS — D83.9 COMMON VARIABLE IMMUNODEFICIENCY: Primary | ICD-10-CM

## 2024-05-14 PROCEDURE — 96365 THER/PROPH/DIAG IV INF INIT: CPT

## 2024-05-14 PROCEDURE — 25010000002 IMMUNE GLOBULIN (HUMAN) 5 GM/50ML SOLUTION 50 ML VIAL: Performed by: ALLERGY & IMMUNOLOGY

## 2024-05-14 PROCEDURE — 25010000002 IMMUNE GLOBULIN (HUMAN) 20 GM/200ML SOLUTION 200 ML VIAL: Performed by: ALLERGY & IMMUNOLOGY

## 2024-05-14 PROCEDURE — 25010000002 IMMUNE GLOBULIN (HUMAN) 10 GM/100ML SOLUTION 100 ML VIAL: Performed by: ALLERGY & IMMUNOLOGY

## 2024-05-14 PROCEDURE — 96366 THER/PROPH/DIAG IV INF ADDON: CPT

## 2024-05-14 RX ADMIN — IMMUNE GLOBULIN (HUMAN) 35 G: 10 INJECTION INTRAVENOUS; SUBCUTANEOUS at 10:23

## 2024-05-15 ENCOUNTER — OFFICE VISIT (OUTPATIENT)
Dept: OTOLARYNGOLOGY | Facility: CLINIC | Age: 59
End: 2024-05-15
Payer: MEDICARE

## 2024-05-15 VITALS
HEART RATE: 66 BPM | SYSTOLIC BLOOD PRESSURE: 136 MMHG | DIASTOLIC BLOOD PRESSURE: 90 MMHG | HEIGHT: 64 IN | TEMPERATURE: 97.7 F | BODY MASS INDEX: 30.39 KG/M2

## 2024-05-15 DIAGNOSIS — H61.21 HEARING LOSS OF RIGHT EAR DUE TO CERUMEN IMPACTION: ICD-10-CM

## 2024-05-15 DIAGNOSIS — J33.8 NASAL SINUS POLYP: Primary | ICD-10-CM

## 2024-05-15 DIAGNOSIS — J32.9 FUNGAL SINUSITIS: ICD-10-CM

## 2024-05-15 DIAGNOSIS — B49 FUNGAL SINUSITIS: ICD-10-CM

## 2024-05-15 DIAGNOSIS — R51.9 HEADACHE DISORDER: ICD-10-CM

## 2024-05-15 DIAGNOSIS — J30.0 VASOMOTOR RHINITIS: ICD-10-CM

## 2024-05-15 RX ORDER — IPRATROPIUM BROMIDE 42 UG/1
2 SPRAY, METERED NASAL 3 TIMES DAILY
Qty: 15 ML | Refills: 10 | Status: SHIPPED | OUTPATIENT
Start: 2024-05-15

## 2024-05-15 RX ORDER — IMMUNE GLOBULIN INFUSION (HUMAN) 100 MG/ML
INJECTION, SOLUTION INTRAVENOUS; SUBCUTANEOUS
COMMUNITY
Start: 2023-11-20

## 2024-05-15 RX ORDER — DEXTROAMPHETAMINE SULFATE 10 MG/1
CAPSULE, EXTENDED RELEASE ORAL
COMMUNITY

## 2024-05-15 NOTE — PROGRESS NOTES
Patient Name: Maia Hui   Visit Date: 05/15/2024   Patient ID: [id number]  Provider: Pranav Fernandes MD    Sex: female  Location: [facility]   YOB: 1965  Location Address: [address] [place] [address], [address] [place] [address],?[place]?[id number]    Primary Care Provider Mike Keen MD  Location Phone: [phone]    Referring Provider: No ref. provider found        Chief Complaint  FLEX SCOPE FOLLOW UP    History of Present Illness  Maia Hui is a 59 y.o. female who presents to [facility] for FLEX SCOPE FOLLOW UP.  Patient has history of chronic sinusitis, obstructive sleep apnea, sinus polyposis and underwent functional endoscopic sinus surgery revision on 12/18/2020 fungus.  Patient also has history of right facial weakness secondary to shingles.  Patient is on IVIG for immune deficiency as well as immunotherapy for allergy.  Patient did have a CT of sinuses obtained on 2/22/2023 which was negative except for surgical changes.  Patient was last seen at [facility] clinic by Dr. Fernandes on 9/25/2023.  Rigid nasal endoscopy at that time showed all sinuses, ethmoid, frontal, and maxillary were clear.  At that visit patient was also complaining about what sounded like vasomotor rhinitis and was started on Atrovent nasal spray 0.06%.    Past Medical History:   Diagnosis Date    Asthma     MILD    Chronic EBV infection     Combined immunodeficiency disorder     RECEIVES IVIG EVERY 4 WEEKS    Cutaneous lupus erythematosus     Depression     MILD    Fibromyalgia     GERD (gastroesophageal reflux disease)     Hematoma     LEFT THIGH. FROM FALL. WILL LET DR SANDOVAL KNOW. HEALING    History of shingles     Hypercholesteremia     Hypertension     Narcolepsy     PONV (postoperative nausea and vomiting)     Greenwood Sanchez auricular syndrome     Rib pain     RESOLVING FROM FALL. HAD XRAY. NORMAL POST FALL. MD AWARE OF    Sjogren's disease     Sleep apnea     CPAP     Uterovaginal prolapse        Past Surgical History:   Procedure Laterality Date    ANKLE SURGERY Right     x3 2007,2008,2013    BLADDER REPAIR      Bladder Mesh    CERVICAL CORPECTOMY      2011 C5-6, C6-7 12/18/2014, Anterior Cervical Corpectomy C6 Arthrodesis at C5-C6 and C6-C7 Dr. Joya    COLONOSCOPY      HAND SURGERY      FUSION OF RIGHT THUMB. CUBITAL TUNNEL RELEASE RIGHT    HYSTERECTOMY      LAPAROSCOPIC TUBAL LIGATION  2005    SINUS SURGERY  2005    TONSILLECTOMY  1980    TOTAL LAPAROSCOPIC HYSTERECTOMY AND COLPOPEXY N/A 06/07/2023    Procedure: ROBOTIC LAPAROSCOPIC HYSTERECTOMY, SALPINGECTOMY, SACROCOLPOPEXY, LYSIS OF ADHENSIONS, CYSTOSCOPY;  Surgeon: Jono Roberts MD;  Location: [facility];  Service: Robotics - [facility];  Laterality: N/A;         Current Outpatient Medications:     acyclovir (ZOVIRAX) 200 MG capsule, Take 1 capsule by mouth 2 (Two) Times a Day., Disp: , Rfl:     ammonium lactate (LAC-HYDRIN) 12 % lotion, Apply 1 application topically to the appropriate area as directed As Needed for Dry Skin., Disp: , Rfl:     amphetamine-dextroamphetamine (Adderall) 10 MG tablet, Take 1 tablet by mouth Daily With Lunch., Disp: 30 tablet, Rfl: 0    amphetamine-dextroamphetamine XR (ADDERALL XR) 20 MG 24 hr capsule, Take 1 capsule by mouth Every Morning, Disp: 30 capsule, Rfl: 0    Biotin 66698 MCG tablet, Take 1 tablet by mouth Daily., Disp: , Rfl:     cyanocobalamin 1000 MCG/ML injection, , Disp: , Rfl:     cyclobenzaprine (FLEXERIL) 10 MG tablet, Take 1 tablet by mouth 3 (Three) Times a Day As Needed for Muscle Spasms., Disp: 90 tablet, Rfl: 3    Deep Sea Nasal Spray 0.65 % nasal spray, , Disp: , Rfl:     dextroamphetamine (DEXEDRINE SPANSULE) 10 MG 24 hr capsule, 1 cap(s) orally once a day (in the morning) for 30 day(s), Disp: , Rfl:     EVENING PRIMROSE OIL PO, Take 1 tablet by mouth Every Evening. HOLD FOR SURGERY., Disp: , Rfl:     fexofenadine (ALLEGRA) 180 MG tablet, , Disp: , Rfl:      fluticasone (FLONASE) 50 MCG/ACT nasal spray, 2 sprays into the nostril(s) as directed by provider Daily., Disp: , Rfl:     immune globulin, human, (Gammagard) 1 GM/10ML solution infusion, 30 grams intravenously every 4 weeks for 28 day(s), Disp: , Rfl:     ipratropium (ATROVENT) 0.06 % nasal spray, 2 sprays into the nostril(s) as directed by provider 3 (Three) Times a Day., Disp: 15 mL, Rfl: 10    Lactobacillus Acid-Pectin (Acidophilus/Citrus Pectin) tablet tablet, Take 1 tablet by mouth Daily., Disp: , Rfl:     losartan-hydrochlorothiazide (HYZAAR) 100-12.5 MG per tablet, Take 1 tablet by mouth Daily., Disp: , Rfl:     montelukast (SINGULAIR) 10 MG tablet, Take 1 tablet by mouth Daily., Disp: , Rfl:     Narcan 4 MG/0.1ML nasal spray, 1 spray into the nostril(s) as directed by provider As Needed (FOR OD)., Disp: , Rfl:     olopatadine (PATANOL) 0.1 % ophthalmic solution, , Disp: , Rfl:     traMADol (ULTRAM) 50 MG tablet, Take 1 tablet by mouth 2 (Two) Times a Day As Needed for Moderate Pain ., Disp: 30 tablet, Rfl: 0    Turmeric Curcumin 500 MG capsule, Take 500 mg by mouth 2 (Two) Times a Day. WILL HOLD FOR SURGERY, Disp: , Rfl:     vitamin D (ERGOCALCIFEROL) 1.25 MG (54559 UT) capsule capsule, Take 1 capsule by mouth Every 7 (Seven) Days. SUNDAYS, Disp: , Rfl:     Zinc 50 MG tablet, Take 1 tablet by mouth Daily., Disp: , Rfl:     loratadine (CLARITIN) 10 MG tablet, Take 1 tablet by mouth Daily., Disp: 30 tablet, Rfl: 20  No current facility-administered medications for this visit.    Facility-Administered Medications Ordered in Other Visits:     Pharmacy to enter IVIG order 35,000 each, 35 g, Intravenous, Once, Benji Deras MD     Allergies   Allergen Reactions    Molds & Smuts Dermatitis    Morphine And Related Other (See Comments)     MAKES HER FEEL LIKE MUSCLE TIGHTEN UP AND THEN SHE PASSES OUT    Statins Other (See Comments)     Pain in hands and feet    Sulfamethoxazole-Trimethoprim Other (See Comments)  "    Crews-Buzz reaction      Pravastatin Myalgia           Acetaminophen Nausea Only    Chicken Meat (Diagnostic) GI Intolerance           Elavil [Amitriptyline] Anxiety    Ibuprofen GI Intolerance     Other reaction(s): severe GI upset       Social History     Tobacco Use    Smoking status: Former     Current packs/day: 0.50     Average packs/day: 0.5 packs/day for 15.0 years (7.5 ttl pk-yrs)     Types: Cigarettes    Smokeless tobacco: Never   Vaping Use    Vaping status: Never Used   Substance Use Topics    Alcohol use: No    Drug use: Never        Objective     Vital Signs:   Vitals:    05/15/24 1122   BP: 136/90   BP Location: Left arm   Patient Position: Sitting   Cuff Size: Adult   Pulse: 66   Temp: 97.7 °F (36.5 °C)   TempSrc: Temporal   Height: 162.6 cm (64\")       Tobacco Use: Medium Risk (5/15/2024)    Patient History     Smoking Tobacco Use: Former     Smokeless Tobacco Use: Never     Passive Exposure: Not on file         Physical Exam    Constitutional   Appearance  well developed, well-nourished, alert and in no acute distress, voice clear and strong    Head   Inspection  no deformities or lesions      Face   Inspection  no facial lesions; House-Brackmann I/VI bilaterally   Palpation  no TMJ crepitus nor  muscle tenderness bilaterally     Eyes/Vision   Visual Fields  extraocular movements are intact, no spontaneous or gaze-induced nystagmus  Conjunctivae  clear   Sclerae  clear   Pupils and Irises  pupils equal, round, and reactive to light.   Nystagmus  not present     Ears, Nose, Mouth and Throat  Ears  External Ears  appearance within normal limits, no lesions present   Otoscopic Examination  tympanic membrane appearance within normal limits bilaterally without perforations, well-aerated middle ears   Hearing  intact to conversational voice both ears   Tunning fork testing    Rinne:  Zamudio:    Nose  External Nose  appearance normal   Intranasal Exam  mucosa within normal limits, " vestibules normal, no intranasal lesions present, septum midline, sinuses non tender to percussion   Modified [place] Test:    Oral Cavity  Oral Mucosa  oral mucosa normal without pallor or cyanosis   Lips  lip appearance normal   Teeth  normal dentition for age   Gums  gums pink, non-swollen, no bleeding present   Tongue  tongue appearance normal; normal mobility   Palate  hard palate normal, soft palate appearance normal with symmetric mobility     Throat  Oropharynx  no inflammation or lesions present, tonsils within normal limits   Hypopharynx  appearance within normal limits   Larynx  voice normal     Neck  Inspection/Palpation  normal appearance, no masses or tenderness, trachea midline; thyroid size normal, nontender, no nodules or masses present on palpation     Lymphatic  Neck  no lymphadenopathy present   Supraclavicular Nodes  no lymphadenopathy present   Preauricular Nodes  no lymphadenopathy present     Respiratory  Respiratory Effort  breathing unlabored   Inspection of Chest  normal appearance, no retractions     Musculoskeletal   Cervical back: Normal range of motion and neck supple.      Skin and Subcutaneous Tissue  General Inspection  Regarding face and neck - there are no rashes present, no lesions present, and no areas of discoloration     Neurologic  Cranial Nerves  cranial nerves II-XII are grossly intact bilaterally   Gait and Station  normal gait, able to stand without diffculty    Psychiatric  Judgement and Insight  judgment and insight intact   Mood and Affect  mood normal, affect appropriate       RESULTS REVIEWED    I have reviewed the following information:   []  Previous Internal Note  []  Previous External Note:   []  Ordered Tests & Results:      Pathology:   Lab Results   Component Value Date    CASEREPORT  06/07/2023     Surgical Pathology Report                         Case: [id number]                                  Authorizing Provider:  Jono Roberts MD       Collected:      "      06/07/2023 09:31 AM          Ordering Location:     [facility]  Received:            06/07/2023 01:37 PM                                 MAIN OR                                                                      Pathologist:           Francisco Chase MD                                                         Specimen:    Uterus, Cervix, Bilateral Fallopian Tubes                                                  CLININFO  06/07/2023     Prolapse.      FINALDX  06/07/2023     1. Uterus, Cervix and Bilateral Fallopian Tubes:     A. Cervix:      1. Nabothian cyst and focal squamous metaplasia.    2. No dysplasia nor malignancy identified.   B. Endometrium:      1. Largely denuded endometrium.    2. No atypical endometrial hyperplasia nor malignancy identified.   C. Myometrium:      1. No neoplasm nor malignancy identified.   D. Serosa:    1. No endometriosis nor malignancy identified.   E. Right and left fallopian tube segments:    1. Complete cross section of fimbriated fallopian tube segments and paratubal cysts.    2. No endometriosis nor malignancy identified.    ROLY/vijay      GROSSDES  06/07/2023     1. Uterus, Cervix, Bilateral Fallopian Tubes.  The specimen is received labeled with the patient's name and \"uterus, cervix, bilateral fallopian tubes\" and consists of a 23 gram uterus with attached cervix and bilateral free floating fallopian tubes.  Uterine corpus measures 3.0 x 3.0 x 2.0 cm.  The serosa is pink purple and abundantly scabrous along the anterior aspect.  The attached cervix measures 2.5 cm in length and up to 2.2 cm in diameter.  The exocervix is pink and glistening surrounding a 0.7 cm patulous os.  The uterus is bivalved to reveal a corrugated cervical canal with no recognizable Nabothian cysts.  The endometrial cavity measures 1.2 x 0.8 cm.  The endometrium is tan to white with a maximum thickness of less than 0.1 cm.  The endometrium is tan pink with a maximum thickness of " 0.9 cm.  There are no intramural nodules or masses.  The bilateral pink purple fimbriated fallopian tubes average 3.8 cm in length with a diameter of 0.5 cm.  One fallopian tube has a recognizable white circular clip.  On sectioning, there are unremarkable stellate lesions.  Representative sections are submitted as follows:   1A - anterior cervix  1B - posterior cervix  1C-1D - anterior endomyometrium  1E-1F - posterior endomyometrium  1G - fallopian tube with clip  1H - second fallopian tube    lp/uso/mena/sabino       MICRO  01/30/2023      Comment:      Microscopic examination performed.         Calcium   Date Value Ref Range Status   02/12/2024 9.8 8.6 - 10.5 mg/dL Final       XR Wrist 3+ View Right    Result Date: 4/30/2024  Degenerative disease as described. No fracture or dislocation.   Electronically Signed By-Dr. Yonny Cantu MD On:4/30/2024 4:21 AM        I have discussed the interpretation of the above results with the patient.    Ear Cerumen Removal    Date/Time: 5/15/2024 11:55 AM    Performed by: Pranav Fernandes MD  Authorized by: Pranav Fernandes MD    Anesthesia:  Local Anesthetic: none  Location details: right ear  Patient tolerance: patient tolerated the procedure well with no immediate complications  Comments: Right ear was examined under otoscope and epithelial debris impaction was cleaned with alligator forceps.  Both tympanic membranes are otherwise unremarkable.  Procedure type: instrumentation, alligator forceps   Sedation:  Patient sedated: no        Nasal endoscopy    Date/Time: 5/15/2024 11:58 AM    Performed by: Pranav Fernandes MD  Authorized by: Pranav Fernandes MD    Consent:     Consent obtained:  Verbal    Consent given by:  Patient    Risks discussed:  Bleeding and pain    Alternatives discussed:  No treatment and delayed treatment  Procedure details:     Medication:  Afrin    Instrument: rigid nasal endoscopy      Scope location: bilateral nare    Post-procedure details:     Patient  tolerance of procedure:  Tolerated well  Comments:      After topical anesthetic and decongestant application, rigid nasal endoscopy was performed in both nostrils showing ethmoid cavities, frontal recess, maxillary antrostomies all examined and noted to be unremarkable with widely patent ostia with no evidence of any polyps or fungus present.  Great nasal airway is present on both sides along with both middle turbinates well medialized with middle meatus wide open.  Patient tolerated procedure well.            Assessment and Plan   Diagnoses and all orders for this visit:    1. Nasal sinus polyp (Primary)  -     $ Nasal / Sinus Endoscopy    2. Fungal sinusitis  -     $ Nasal / Sinus Endoscopy    3. Headache disorder    4. Vasomotor rhinitis  -     ipratropium (ATROVENT) 0.06 % nasal spray; 2 sprays into the nostril(s) as directed by provider 3 (Three) Times a Day.  Dispense: 15 mL; Refill: 10    5. Hearing loss of right ear due to cerumen impaction  -     Ear Cerumen Removal        Maia Hui  reports that she has quit smoking. Her smoking use included cigarettes. She has a 7.5 pack-year smoking history. She has never used smokeless tobacco.       Plan:  Patient Instructions   1.  Patient is doing very well with no evidence of any recurrent sinus polyps or fungus on rigid nasal endoscopy.  2.  Patient also had some symptoms of flutter but most likely due to tensor tympani muscle spasm that had resolved.  Otherwise patient has cerumen and epithelial debris impaction in the right ear that was removed uneventfully.  3.  Patient apparently got nasal spray changed by allergy physician to Flonase.  However she may use Flonase but I think she may still have vasomotor rhinitis and she did have some excellent results with it.  Therefore I am going to reorder Atrovent nasal spray.  4.  I will see patient in 1 year with rigid nasal endoscopy.      Follow Up   Return in about 1 year (around 5/15/2025), or Follow-up with  rigid nasal endoscopy in 1 year.  Patient was given instructions and counseling regarding her condition or for health maintenance advice. Please see specific information pulled into the AVS if appropriate.

## 2024-05-15 NOTE — PATIENT INSTRUCTIONS
1.  Patient is doing very well with no evidence of any recurrent sinus polyps or fungus on rigid nasal endoscopy.  2.  Patient also had some symptoms of flutter but most likely due to tensor tympani muscle spasm that had resolved.  Otherwise patient has cerumen and epithelial debris impaction in the right ear that was removed uneventfully.  3.  Patient apparently got nasal spray changed by allergy physician to Flonase.  However she may use Flonase but I think she may still have vasomotor rhinitis and she did have some excellent results with it.  Therefore I am going to reorder Atrovent nasal spray.  4.  I will see patient in 1 year with rigid nasal endoscopy.

## 2024-06-11 ENCOUNTER — HOSPITAL ENCOUNTER (OUTPATIENT)
Dept: INFUSION THERAPY | Facility: HOSPITAL | Age: 59
Discharge: HOME | End: 2024-06-11
Admitting: ALLERGY & IMMUNOLOGY
Payer: MEDICARE

## 2024-06-11 VITALS
SYSTOLIC BLOOD PRESSURE: 144 MMHG | OXYGEN SATURATION: 98 % | TEMPERATURE: 97.9 F | RESPIRATION RATE: 18 BRPM | HEIGHT: 64 IN | DIASTOLIC BLOOD PRESSURE: 92 MMHG | HEART RATE: 67 BPM | BODY MASS INDEX: 30.94 KG/M2 | WEIGHT: 181.22 LBS

## 2024-06-11 DIAGNOSIS — D83.9 COMMON VARIABLE IMMUNODEFICIENCY: Primary | ICD-10-CM

## 2024-06-11 PROCEDURE — 96366 THER/PROPH/DIAG IV INF ADDON: CPT

## 2024-06-11 PROCEDURE — 25010000002 IMMUNE GLOBULIN (HUMAN) 5 GM/50ML SOLUTION 50 ML VIAL: Performed by: ALLERGY & IMMUNOLOGY

## 2024-06-11 PROCEDURE — 25010000002 IMMUNE GLOBULIN (HUMAN) 20 GM/200ML SOLUTION 200 ML VIAL: Performed by: ALLERGY & IMMUNOLOGY

## 2024-06-11 PROCEDURE — 25010000002 IMMUNE GLOBULIN (HUMAN) 10 GM/100ML SOLUTION 100 ML VIAL: Performed by: ALLERGY & IMMUNOLOGY

## 2024-06-11 PROCEDURE — 96365 THER/PROPH/DIAG IV INF INIT: CPT

## 2024-06-11 RX ADMIN — IMMUNE GLOBULIN (HUMAN) 35 G: 10 INJECTION INTRAVENOUS; SUBCUTANEOUS at 10:02

## 2024-07-09 ENCOUNTER — HOSPITAL ENCOUNTER (OUTPATIENT)
Dept: INFUSION THERAPY | Facility: HOSPITAL | Age: 59
Discharge: HOME | End: 2024-07-09
Admitting: ALLERGY & IMMUNOLOGY
Payer: MEDICARE

## 2024-07-09 VITALS
HEIGHT: 64 IN | HEART RATE: 80 BPM | DIASTOLIC BLOOD PRESSURE: 76 MMHG | OXYGEN SATURATION: 97 % | BODY MASS INDEX: 30.64 KG/M2 | RESPIRATION RATE: 20 BRPM | WEIGHT: 179.45 LBS | TEMPERATURE: 97.7 F | SYSTOLIC BLOOD PRESSURE: 128 MMHG

## 2024-07-09 DIAGNOSIS — D83.9 COMMON VARIABLE IMMUNODEFICIENCY: Primary | ICD-10-CM

## 2024-07-09 LAB
ALBUMIN SERPL-MCNC: 4.3 G/DL (ref 3.5–5.2)
ALBUMIN/GLOB SERPL: 1.7 G/DL
ALP SERPL-CCNC: 139 U/L (ref 39–117)
ALT SERPL W P-5'-P-CCNC: 7 U/L (ref 1–33)
ANION GAP SERPL CALCULATED.3IONS-SCNC: 8.5 MMOL/L (ref 5–15)
AST SERPL-CCNC: 18 U/L (ref 1–32)
BASOPHILS # BLD AUTO: 0.02 10*3/MM3 (ref 0–0.2)
BASOPHILS NFR BLD AUTO: 0.3 % (ref 0–1.5)
BILIRUB SERPL-MCNC: 0.3 MG/DL (ref 0–1.2)
BUN SERPL-MCNC: 16 MG/DL (ref 8–23)
BUN/CREAT SERPL: 22.9 (ref 7–25)
CALCIUM SPEC-SCNC: 9 MG/DL (ref 8.6–10.5)
CHLORIDE SERPL-SCNC: 102 MMOL/L (ref 98–107)
CO2 SERPL-SCNC: 29.5 MMOL/L (ref 22–29)
CREAT SERPL-MCNC: 0.7 MG/DL (ref 0.57–1)
DEPRECATED RDW RBC AUTO: 43.1 FL (ref 37–54)
EGFRCR SERPLBLD CKD-EPI 2021: 98.5 ML/MIN/1.73
EOSINOPHIL # BLD AUTO: 0.06 10*3/MM3 (ref 0–0.4)
EOSINOPHIL NFR BLD AUTO: 0.9 % (ref 0.3–6.2)
ERYTHROCYTE [DISTWIDTH] IN BLOOD BY AUTOMATED COUNT: 13.2 % (ref 12.3–15.4)
GLOBULIN UR ELPH-MCNC: 2.6 GM/DL
GLUCOSE SERPL-MCNC: 101 MG/DL (ref 65–99)
HCT VFR BLD AUTO: 40.8 % (ref 34–46.6)
HGB BLD-MCNC: 13.7 G/DL (ref 12–15.9)
IGG1 SER-MCNC: 1132 MG/DL (ref 700–1600)
IMM GRANULOCYTES # BLD AUTO: 0.03 10*3/MM3 (ref 0–0.05)
IMM GRANULOCYTES NFR BLD AUTO: 0.4 % (ref 0–0.5)
LYMPHOCYTES # BLD AUTO: 2.43 10*3/MM3 (ref 0.7–3.1)
LYMPHOCYTES NFR BLD AUTO: 35.7 % (ref 19.6–45.3)
MCH RBC QN AUTO: 30.2 PG (ref 26.6–33)
MCHC RBC AUTO-ENTMCNC: 33.6 G/DL (ref 31.5–35.7)
MCV RBC AUTO: 89.9 FL (ref 79–97)
MONOCYTES # BLD AUTO: 0.41 10*3/MM3 (ref 0.1–0.9)
MONOCYTES NFR BLD AUTO: 6 % (ref 5–12)
NEUTROPHILS NFR BLD AUTO: 3.85 10*3/MM3 (ref 1.7–7)
NEUTROPHILS NFR BLD AUTO: 56.7 % (ref 42.7–76)
NRBC BLD AUTO-RTO: 0 /100 WBC (ref 0–0.2)
PLATELET # BLD AUTO: 274 10*3/MM3 (ref 140–450)
PMV BLD AUTO: 11.1 FL (ref 6–12)
POTASSIUM SERPL-SCNC: 3.7 MMOL/L (ref 3.5–5.2)
PROT SERPL-MCNC: 6.9 G/DL (ref 6–8.5)
RBC # BLD AUTO: 4.54 10*6/MM3 (ref 3.77–5.28)
SODIUM SERPL-SCNC: 140 MMOL/L (ref 136–145)
WBC NRBC COR # BLD AUTO: 6.8 10*3/MM3 (ref 3.4–10.8)

## 2024-07-09 PROCEDURE — 80053 COMPREHEN METABOLIC PANEL: CPT | Performed by: ALLERGY & IMMUNOLOGY

## 2024-07-09 PROCEDURE — 96366 THER/PROPH/DIAG IV INF ADDON: CPT

## 2024-07-09 PROCEDURE — 85025 COMPLETE CBC W/AUTO DIFF WBC: CPT | Performed by: ALLERGY & IMMUNOLOGY

## 2024-07-09 PROCEDURE — 96365 THER/PROPH/DIAG IV INF INIT: CPT

## 2024-07-09 PROCEDURE — 82784 ASSAY IGA/IGD/IGG/IGM EACH: CPT | Performed by: ALLERGY & IMMUNOLOGY

## 2024-07-09 PROCEDURE — 25010000002 IMMUNE GLOBULIN (HUMAN) 20 GM/200ML SOLUTION 200 ML VIAL: Performed by: ALLERGY & IMMUNOLOGY

## 2024-07-09 PROCEDURE — 25010000002 IMMUNE GLOBULIN (HUMAN) 10 GM/100ML SOLUTION 100 ML VIAL: Performed by: ALLERGY & IMMUNOLOGY

## 2024-07-09 PROCEDURE — 25010000002 IMMUNE GLOBULIN (HUMAN) 5 GM/50ML SOLUTION 50 ML VIAL: Performed by: ALLERGY & IMMUNOLOGY

## 2024-07-09 RX ADMIN — IMMUNE GLOBULIN (HUMAN) 35 G: 10 INJECTION INTRAVENOUS; SUBCUTANEOUS at 10:06

## 2024-08-06 ENCOUNTER — HOSPITAL ENCOUNTER (OUTPATIENT)
Dept: INFUSION THERAPY | Facility: HOSPITAL | Age: 59
Discharge: HOME | End: 2024-08-06
Admitting: ALLERGY & IMMUNOLOGY
Payer: MEDICARE

## 2024-08-06 VITALS
HEART RATE: 72 BPM | SYSTOLIC BLOOD PRESSURE: 128 MMHG | DIASTOLIC BLOOD PRESSURE: 84 MMHG | RESPIRATION RATE: 18 BRPM | OXYGEN SATURATION: 98 % | TEMPERATURE: 97.6 F | WEIGHT: 180.12 LBS | BODY MASS INDEX: 30.75 KG/M2 | HEIGHT: 64 IN

## 2024-08-06 DIAGNOSIS — D83.9 COMMON VARIABLE IMMUNODEFICIENCY: Primary | ICD-10-CM

## 2024-08-06 PROCEDURE — 25010000002 IMMUNE GLOBULIN (HUMAN) 10 GM/100ML SOLUTION 100 ML VIAL: Performed by: ALLERGY & IMMUNOLOGY

## 2024-08-06 PROCEDURE — 25010000002 IMMUNE GLOBULIN (HUMAN) 5 GM/50ML SOLUTION 50 ML VIAL: Performed by: ALLERGY & IMMUNOLOGY

## 2024-08-06 PROCEDURE — 96366 THER/PROPH/DIAG IV INF ADDON: CPT

## 2024-08-06 PROCEDURE — 96365 THER/PROPH/DIAG IV INF INIT: CPT

## 2024-08-06 PROCEDURE — 25010000002 IMMUNE GLOBULIN (HUMAN) 20 GM/200ML SOLUTION 200 ML VIAL: Performed by: ALLERGY & IMMUNOLOGY

## 2024-08-06 RX ADMIN — IMMUNE GLOBULIN (HUMAN) 35 G: 10 INJECTION INTRAVENOUS; SUBCUTANEOUS at 10:40

## 2024-08-09 DIAGNOSIS — G47.419 NARCOLEPSY WITHOUT CATAPLEXY: ICD-10-CM

## 2024-08-13 RX ORDER — DEXTROAMPHETAMINE SACCHARATE, AMPHETAMINE ASPARTATE MONOHYDRATE, DEXTROAMPHETAMINE SULFATE AND AMPHETAMINE SULFATE 5; 5; 5; 5 MG/1; MG/1; MG/1; MG/1
20 CAPSULE, EXTENDED RELEASE ORAL EVERY MORNING
Qty: 30 CAPSULE | Refills: 0 | Status: SHIPPED | OUTPATIENT
Start: 2024-08-13

## 2024-08-13 RX ORDER — DEXTROAMPHETAMINE SACCHARATE, AMPHETAMINE ASPARTATE, DEXTROAMPHETAMINE SULFATE AND AMPHETAMINE SULFATE 2.5; 2.5; 2.5; 2.5 MG/1; MG/1; MG/1; MG/1
10 TABLET ORAL
Qty: 30 TABLET | Refills: 0 | Status: SHIPPED | OUTPATIENT
Start: 2024-08-13

## 2024-09-03 ENCOUNTER — HOSPITAL ENCOUNTER (OUTPATIENT)
Dept: INFUSION THERAPY | Facility: HOSPITAL | Age: 59
Discharge: HOME | End: 2024-09-03
Admitting: ALLERGY & IMMUNOLOGY
Payer: MEDICARE

## 2024-09-03 VITALS
TEMPERATURE: 98.2 F | OXYGEN SATURATION: 100 % | WEIGHT: 179.23 LBS | DIASTOLIC BLOOD PRESSURE: 84 MMHG | HEART RATE: 66 BPM | BODY MASS INDEX: 30.6 KG/M2 | RESPIRATION RATE: 20 BRPM | HEIGHT: 64 IN | SYSTOLIC BLOOD PRESSURE: 128 MMHG

## 2024-09-03 DIAGNOSIS — D83.9 COMMON VARIABLE IMMUNODEFICIENCY: Primary | ICD-10-CM

## 2024-09-03 PROCEDURE — 25010000002 IMMUNE GLOBULIN (HUMAN) 10 GM/100ML SOLUTION 100 ML VIAL: Performed by: ALLERGY & IMMUNOLOGY

## 2024-09-03 PROCEDURE — 25010000002 IMMUNE GLOBULIN (HUMAN) 20 GM/200ML SOLUTION 200 ML VIAL: Performed by: ALLERGY & IMMUNOLOGY

## 2024-09-03 PROCEDURE — 25010000002 IMMUNE GLOBULIN (HUMAN) 5 GM/50ML SOLUTION 50 ML VIAL: Performed by: ALLERGY & IMMUNOLOGY

## 2024-09-03 PROCEDURE — 96366 THER/PROPH/DIAG IV INF ADDON: CPT

## 2024-09-03 PROCEDURE — 96365 THER/PROPH/DIAG IV INF INIT: CPT

## 2024-09-03 RX ADMIN — IMMUNE GLOBULIN (HUMAN) 35 G: 10 INJECTION INTRAVENOUS; SUBCUTANEOUS at 10:12

## 2024-10-01 ENCOUNTER — HOSPITAL ENCOUNTER (OUTPATIENT)
Dept: INFUSION THERAPY | Facility: HOSPITAL | Age: 59
Discharge: HOME | End: 2024-10-01
Admitting: ALLERGY & IMMUNOLOGY
Payer: MEDICARE

## 2024-10-01 VITALS
HEART RATE: 70 BPM | RESPIRATION RATE: 18 BRPM | WEIGHT: 179.68 LBS | OXYGEN SATURATION: 99 % | DIASTOLIC BLOOD PRESSURE: 96 MMHG | BODY MASS INDEX: 30.67 KG/M2 | HEIGHT: 64 IN | SYSTOLIC BLOOD PRESSURE: 131 MMHG | TEMPERATURE: 97.7 F

## 2024-10-01 DIAGNOSIS — D83.9 COMMON VARIABLE IMMUNODEFICIENCY: Primary | ICD-10-CM

## 2024-10-01 PROCEDURE — 96366 THER/PROPH/DIAG IV INF ADDON: CPT

## 2024-10-01 PROCEDURE — 25010000002 IMMUNE GLOBULIN (HUMAN) 5 GM/50ML SOLUTION: Performed by: ALLERGY & IMMUNOLOGY

## 2024-10-01 PROCEDURE — 25010000002 IMMUNE GLOBULIN (HUMAN) 10 GM/100ML SOLUTION: Performed by: ALLERGY & IMMUNOLOGY

## 2024-10-01 PROCEDURE — 96365 THER/PROPH/DIAG IV INF INIT: CPT

## 2024-10-01 PROCEDURE — 25010000002 IMMUNE GLOBULIN (HUMAN) 20 GM/200ML SOLUTION: Performed by: ALLERGY & IMMUNOLOGY

## 2024-10-01 RX ADMIN — IMMUNE GLOBULIN (HUMAN) 20 G: 10 INJECTION INTRAVENOUS; SUBCUTANEOUS at 09:49

## 2024-10-01 RX ADMIN — IMMUNE GLOBULIN (HUMAN) 5 G: 10 INJECTION INTRAVENOUS; SUBCUTANEOUS at 12:59

## 2024-10-01 RX ADMIN — IMMUNE GLOBULIN (HUMAN) 10 G: 10 INJECTION INTRAVENOUS; SUBCUTANEOUS at 12:21

## 2024-10-11 DIAGNOSIS — G47.419 NARCOLEPSY WITHOUT CATAPLEXY: ICD-10-CM

## 2024-10-11 NOTE — TELEPHONE ENCOUNTER
Patient called for refill on    - amphetamine-dextroamphetamine (Adderall) 10 MG tablet   - amphetamine-dextroamphetamine XR (ADDERALL XR) 20 MG 24 hr capsule

## 2024-10-14 RX ORDER — DEXTROAMPHETAMINE SACCHARATE, AMPHETAMINE ASPARTATE, DEXTROAMPHETAMINE SULFATE AND AMPHETAMINE SULFATE 2.5; 2.5; 2.5; 2.5 MG/1; MG/1; MG/1; MG/1
10 TABLET ORAL
Qty: 30 TABLET | Refills: 0 | Status: SHIPPED | OUTPATIENT
Start: 2024-10-14

## 2024-10-14 RX ORDER — DEXTROAMPHETAMINE SACCHARATE, AMPHETAMINE ASPARTATE MONOHYDRATE, DEXTROAMPHETAMINE SULFATE AND AMPHETAMINE SULFATE 5; 5; 5; 5 MG/1; MG/1; MG/1; MG/1
20 CAPSULE, EXTENDED RELEASE ORAL EVERY MORNING
Qty: 30 CAPSULE | Refills: 0 | Status: SHIPPED | OUTPATIENT
Start: 2024-10-14

## 2024-10-22 ENCOUNTER — OFFICE VISIT (OUTPATIENT)
Dept: SLEEP MEDICINE | Facility: HOSPITAL | Age: 59
End: 2024-10-22
Payer: MEDICARE

## 2024-10-22 VITALS — OXYGEN SATURATION: 97 % | HEART RATE: 87 BPM | WEIGHT: 181.1 LBS | HEIGHT: 64 IN | BODY MASS INDEX: 30.92 KG/M2

## 2024-10-22 DIAGNOSIS — G47.419 NARCOLEPSY WITHOUT CATAPLEXY: ICD-10-CM

## 2024-10-22 DIAGNOSIS — G47.33 OBSTRUCTIVE SLEEP APNEA: Primary | ICD-10-CM

## 2024-10-22 DIAGNOSIS — E66.9 OBESITY (BMI 30-39.9): ICD-10-CM

## 2024-10-22 PROCEDURE — G0463 HOSPITAL OUTPT CLINIC VISIT: HCPCS

## 2024-10-22 PROCEDURE — 99214 OFFICE O/P EST MOD 30 MIN: CPT | Performed by: FAMILY MEDICINE

## 2024-10-22 PROCEDURE — 1160F RVW MEDS BY RX/DR IN RCRD: CPT | Performed by: FAMILY MEDICINE

## 2024-10-22 PROCEDURE — 1159F MED LIST DOCD IN RCRD: CPT | Performed by: FAMILY MEDICINE

## 2024-10-22 RX ORDER — DEXTROAMPHETAMINE SACCHARATE, AMPHETAMINE ASPARTATE MONOHYDRATE, DEXTROAMPHETAMINE SULFATE AND AMPHETAMINE SULFATE 5; 5; 5; 5 MG/1; MG/1; MG/1; MG/1
20 CAPSULE, EXTENDED RELEASE ORAL EVERY MORNING
Qty: 90 CAPSULE | Refills: 0 | Status: SHIPPED | OUTPATIENT
Start: 2024-11-12

## 2024-10-22 RX ORDER — DEXTROAMPHETAMINE SACCHARATE, AMPHETAMINE ASPARTATE, DEXTROAMPHETAMINE SULFATE AND AMPHETAMINE SULFATE 2.5; 2.5; 2.5; 2.5 MG/1; MG/1; MG/1; MG/1
10 TABLET ORAL
Qty: 90 TABLET | Refills: 0 | Status: SHIPPED | OUTPATIENT
Start: 2024-11-12

## 2024-10-22 NOTE — PROGRESS NOTES
"Follow Up [facility] Note     Chief Complaint:  DILCIA     Primary Care Physician: Mike Keen MD    Interval History:   The patient is a 59 y.o. female  who was last seen in the sleep lab: 4/18/2024.  Presents today for 6-month follow-up on DILCIA and narcolepsy without cataplexy. Sleep study from 2011 showed AHI 6.1 RDI 66.2.  Not a candidate for oral mandibular device or for inspire therapy.  Treated with CPAP however difficulty with dryness of the throat.  Also diagnosed with narcolepsy without cataplexy; sleep study done at [facility].  Treated with Adderall XR 20 mg every morning and 10 mg extended release.  Patient is to get 90-day supplies from [facility].    DME: [facility]; Data range: 7/24/2024 - 10/21/2024; Average usage: 8 hours 19 minutes; Average AHI: 6.8, Average PAP pressure: 8 cm H2O.    Review of Systems:    A complete review of systems was done and all were negative with the exception of morning headache postnasal drip dry mouth nasal congestion    Social History:    Social History     Socioeconomic History    Marital status:     Number of children: 1    Years of education: College Graduate   Tobacco Use    Smoking status: Former     Current packs/day: 0.50     Average packs/day: 0.5 packs/day for 15.0 years (7.5 ttl pk-yrs)     Types: Cigarettes    Smokeless tobacco: Never   Vaping Use    Vaping status: Never Used   Substance and Sexual Activity    Alcohol use: No    Drug use: Never    Sexual activity: Defer       Allergies:  Molds & smuts, Morphine and codeine, Statins, Sulfamethoxazole-trimethoprim, Pravastatin, Acetaminophen, Chicken meat (diagnostic), Elavil [amitriptyline], and Ibuprofen     Medication Review:  Reviewed.      Vital Signs:    Vitals:    10/22/24 0900   Pulse: 87   SpO2: 97%   Weight: 82.1 kg (181 lb 1.6 oz)   Height: 162.6 cm (64.02\")     Body mass index is 31.07 kg/m².    Physical Exam:    Constitutional:  Well developed 59 y.o. female that appears " in no apparent distress.  Awake & oriented times 3.  Normal mood with normal recent and remote memory and normal judgement.  Eyes:  Conjunctivae normal.  Oropharynx: Previously, moist mucous membranes.    Self-administered [place] Sleepiness Scale test results: 5   0-5 Lower normal daytime sleepiness  6-10 Higher normal daytime sleepiness  11-12 Mild, 13-15 Moderate, & 16-24 Severe excessive daytime sleepiness    Impression:   1. Obstructive sleep apnea    2. Narcolepsy without cataplexy    3. Obesity (BMI 30-39.9)          DILCIA adequately under control with CPAP set pressure of 8 cm H2O with good compliance and usage.  Patient okay to continue Adderall 20 mg XR every morning and Adderall 10 mg immediate release every afternoon.  Refill order placed today.  However data from November 12, 2020 for 90-day supply.    Plan:  Good sleep hygiene measures should be maintained.  Weight loss would be beneficial in this patient who obese by Body mass index is 31.07 kg/m²..      After evaluating the patient and assessing results available, the patient is benefiting from the treatment being provided.     The patient will continue CPAP.  After clinical evaluation and review of downloads, I recommend no changes to the patient's pressures.  A new prescription will be sent to the patient's DME.    Caution during activities that require prolonged concentration is strongly advised if sleepiness returns. Changing of PAP supplies regularly is important for effective use. Patient needs to change cushion on the mask or plugs on nasal pillows along with disposable filters once every month and change mask frame, tubing, headgear and Velcro straps every 6 months at the minimum.    I answered all of the patient's questions.  The patient will call for any problems and will follow up in 6 months.      Jens Nelson MD  Sleep Medicine  10/22/24  11:14 EDT

## 2024-10-29 ENCOUNTER — HOSPITAL ENCOUNTER (OUTPATIENT)
Dept: INFUSION THERAPY | Facility: HOSPITAL | Age: 59
Discharge: HOME | End: 2024-10-29
Admitting: ALLERGY & IMMUNOLOGY
Payer: MEDICARE

## 2024-10-29 VITALS
HEART RATE: 71 BPM | SYSTOLIC BLOOD PRESSURE: 142 MMHG | DIASTOLIC BLOOD PRESSURE: 76 MMHG | HEIGHT: 64 IN | BODY MASS INDEX: 30.86 KG/M2 | RESPIRATION RATE: 18 BRPM | TEMPERATURE: 97.8 F | OXYGEN SATURATION: 99 % | WEIGHT: 180.78 LBS

## 2024-10-29 DIAGNOSIS — D83.9 COMMON VARIABLE IMMUNODEFICIENCY: Primary | ICD-10-CM

## 2024-10-29 PROCEDURE — 25010000002 IMMUNE GLOBULIN (HUMAN) 5 GM/50ML SOLUTION: Performed by: ALLERGY & IMMUNOLOGY

## 2024-10-29 PROCEDURE — 25010000002 IMMUNE GLOBULIN (HUMAN) 10 GM/100ML SOLUTION: Performed by: ALLERGY & IMMUNOLOGY

## 2024-10-29 PROCEDURE — 96366 THER/PROPH/DIAG IV INF ADDON: CPT

## 2024-10-29 PROCEDURE — 96365 THER/PROPH/DIAG IV INF INIT: CPT

## 2024-10-29 PROCEDURE — 25010000002 IMMUNE GLOBULIN (HUMAN) 20 GM/200ML SOLUTION: Performed by: ALLERGY & IMMUNOLOGY

## 2024-10-29 RX ADMIN — IMMUNE GLOBULIN (HUMAN) 20 G: 10 INJECTION INTRAVENOUS; SUBCUTANEOUS at 11:28

## 2024-10-29 RX ADMIN — IMMUNE GLOBULIN (HUMAN) 10 G: 10 INJECTION INTRAVENOUS; SUBCUTANEOUS at 10:35

## 2024-10-29 RX ADMIN — IMMUNE GLOBULIN (HUMAN) 5 G: 10 INJECTION INTRAVENOUS; SUBCUTANEOUS at 09:42

## 2024-11-07 ENCOUNTER — OFFICE VISIT (OUTPATIENT)
Dept: ORTHOPEDIC SURGERY | Facility: CLINIC | Age: 59
End: 2024-11-07
Payer: MEDICARE

## 2024-11-07 VITALS — OXYGEN SATURATION: 96 % | WEIGHT: 180 LBS | HEIGHT: 64 IN | HEART RATE: 75 BPM | BODY MASS INDEX: 30.73 KG/M2

## 2024-11-07 DIAGNOSIS — M16.11 PRIMARY OSTEOARTHRITIS OF RIGHT HIP: Primary | ICD-10-CM

## 2024-11-07 RX ORDER — DICLOFENAC SODIUM 75 MG/1
75 TABLET, DELAYED RELEASE ORAL 2 TIMES DAILY
Qty: 60 TABLET | Refills: 1 | Status: SHIPPED | OUTPATIENT
Start: 2024-11-07

## 2024-11-07 NOTE — PROGRESS NOTES
Chief Complaint  Pain and Initial Evaluation of the Right Hip       Subjective      Maia Hui presents to [facility] for an evaluation  of her right hip. Her right hip has been bothering her for several months. She locates her pain to the lateral aspect of her hip and groin pain. She has a hard time lifting her hip to get in and out of the car and walking over curbs. She has pain with prolonged walking as well. She reports no specific injury, trauma, falls and surgery on her hip. She has been taking over the counter medications for pain control with little relief. She recently had an MRI on her right that was obtained by her family doctor. She has had prior steroid injections on her neck in the past and states she had a bad reaction to this injection.     Allergies   Allergen Reactions    Molds & Smuts Dermatitis    Morphine And Codeine Other (See Comments)     MAKES HER FEEL LIKE MUSCLE TIGHTEN UP AND THEN SHE PASSES OUT    Statins Other (See Comments)     Pain in hands and feet    Sulfamethoxazole-Trimethoprim Other (See Comments)     Crews-Buzz reaction      Pravastatin Myalgia           Acetaminophen Nausea Only    Chicken Meat (Diagnostic) GI Intolerance           Elavil [Amitriptyline] Anxiety    Ibuprofen GI Intolerance     Other reaction(s): severe GI upset        Social History     Socioeconomic History    Marital status:     Number of children: 1    Years of education: College Graduate   Tobacco Use    Smoking status: Former     Current packs/day: 0.50     Average packs/day: 0.5 packs/day for 15.0 years (7.5 ttl pk-yrs)     Types: Cigarettes    Smokeless tobacco: Never   Vaping Use    Vaping status: Never Used   Substance and Sexual Activity    Alcohol use: No    Drug use: Never    Sexual activity: Defer        I reviewed the patient's chief complaint, history of present illness, review of systems, past medical history, surgical history, family history,  "social history, medications, and allergy list.     Review of Systems     Constitutional: Denies fevers, chills, weight loss  Cardiovascular: Denies chest pain, shortness of breath  Skin: Denies rashes, acute skin changes  Neurologic: Denies headache, loss of consciousness  MSK: right hip pain       Vital Signs:   Pulse 75   Ht 162.6 cm (64\")   Wt 81.6 kg (180 lb)   SpO2 96%   BMI 30.90 kg/m²            Ortho Exam    Physical Exam  General:Alert. No acute distress     Right lower extremity: flexion to 90 degrees, external rotation  to 20 degrees, internal rotation to 10 degrees, negative  straight leg raise, equal leg lengths, tender to the lateral hip, distal neurovascularly intact, calf soft, positive  pulses, positive EHL, FHL, GS, and TA. Sensation intact to all 5 nerves of the foot.      Procedures    Imaging Results (Most Recent)       Procedure Component Value Units Date/Time    IMAGING SCANNED [[id number]] Resulted: 11/07/24     Updated: 10/30/24 1530             Result Review :       No results found.         Assessment and Plan     Diagnoses and all orders for this visit:    1. Primary osteoarthritis of right hip (Primary)    Other orders  -     IMAGING SCANNED        The patient presents here today for an evaluation  of her right hip.     Discussed operative treatment options regarding a right hip arthroplasty versus non operative treatment options regarding injections, medications and therapy.     She wishes to proceed with conservative treatment options.     Order placed today for physical therapy and home exercises given today.      Diclofenac sent into the pharmacy today.     Call or return if worsening symptoms.    Follow Up     6 - 8 weeks     Patient was given instructions and counseling regarding her condition or for health maintenance advice. Please see specific information pulled into the AVS if appropriate.     Scribed for Mick Herrera MD by Janae Castellon.  11/07/24   11:21 EST    I " have personally performed the services described in this document as scribed by the above individual and it is both accurate and complete. Mick Herrera MD 11/07/24

## 2024-11-26 ENCOUNTER — HOSPITAL ENCOUNTER (OUTPATIENT)
Dept: INFUSION THERAPY | Facility: HOSPITAL | Age: 59
Discharge: HOME | End: 2024-11-26
Admitting: ALLERGY & IMMUNOLOGY
Payer: MEDICARE

## 2024-11-26 VITALS
WEIGHT: 181.66 LBS | HEIGHT: 64 IN | SYSTOLIC BLOOD PRESSURE: 136 MMHG | RESPIRATION RATE: 18 BRPM | HEART RATE: 68 BPM | OXYGEN SATURATION: 100 % | BODY MASS INDEX: 31.01 KG/M2 | TEMPERATURE: 97.8 F | DIASTOLIC BLOOD PRESSURE: 76 MMHG

## 2024-11-26 DIAGNOSIS — D83.9 COMMON VARIABLE IMMUNODEFICIENCY: Primary | ICD-10-CM

## 2024-11-26 PROCEDURE — 96365 THER/PROPH/DIAG IV INF INIT: CPT

## 2024-11-26 PROCEDURE — 96366 THER/PROPH/DIAG IV INF ADDON: CPT

## 2024-11-26 PROCEDURE — 25010000002 IMMUNE GLOBULIN (HUMAN) 10 GM/100ML SOLUTION: Performed by: ALLERGY & IMMUNOLOGY

## 2024-11-26 PROCEDURE — 25010000002 IMMUNE GLOBULIN (HUMAN) 5 GM/50ML SOLUTION: Performed by: ALLERGY & IMMUNOLOGY

## 2024-11-26 PROCEDURE — 25010000002 IMMUNE GLOBULIN (HUMAN) 20 GM/200ML SOLUTION: Performed by: ALLERGY & IMMUNOLOGY

## 2024-11-26 RX ADMIN — IMMUNE GLOBULIN (HUMAN) 10 G: 10 INJECTION INTRAVENOUS; SUBCUTANEOUS at 10:37

## 2024-11-26 RX ADMIN — IMMUNE GLOBULIN (HUMAN) 5 G: 10 INJECTION INTRAVENOUS; SUBCUTANEOUS at 09:44

## 2024-11-26 RX ADMIN — IMMUNE GLOBULIN (HUMAN) 20 G: 10 INJECTION INTRAVENOUS; SUBCUTANEOUS at 11:24

## 2024-12-24 ENCOUNTER — HOSPITAL ENCOUNTER (OUTPATIENT)
Dept: INFUSION THERAPY | Facility: HOSPITAL | Age: 59
Discharge: HOME | End: 2024-12-24
Admitting: ALLERGY & IMMUNOLOGY
Payer: MEDICARE

## 2024-12-24 VITALS
DIASTOLIC BLOOD PRESSURE: 72 MMHG | HEIGHT: 64 IN | OXYGEN SATURATION: 97 % | SYSTOLIC BLOOD PRESSURE: 127 MMHG | BODY MASS INDEX: 31.13 KG/M2 | RESPIRATION RATE: 20 BRPM | HEART RATE: 70 BPM | TEMPERATURE: 97.5 F | WEIGHT: 182.32 LBS

## 2024-12-24 DIAGNOSIS — D83.9 COMMON VARIABLE IMMUNODEFICIENCY: Primary | ICD-10-CM

## 2024-12-24 LAB
ALBUMIN SERPL-MCNC: 4.3 G/DL (ref 3.5–5.2)
ALBUMIN/GLOB SERPL: 1.4 G/DL
ALP SERPL-CCNC: 124 U/L (ref 39–117)
ALT SERPL W P-5'-P-CCNC: 10 U/L (ref 1–33)
ANION GAP SERPL CALCULATED.3IONS-SCNC: 9.6 MMOL/L (ref 5–15)
AST SERPL-CCNC: 15 U/L (ref 1–32)
BASOPHILS # BLD AUTO: 0.01 10*3/MM3 (ref 0–0.2)
BASOPHILS NFR BLD AUTO: 0.1 % (ref 0–1.5)
BILIRUB SERPL-MCNC: 0.4 MG/DL (ref 0–1.2)
BUN SERPL-MCNC: 16 MG/DL (ref 8–23)
BUN/CREAT SERPL: 19.5 (ref 7–25)
CALCIUM SPEC-SCNC: 9.5 MG/DL (ref 8.6–10.5)
CHLORIDE SERPL-SCNC: 100 MMOL/L (ref 98–107)
CO2 SERPL-SCNC: 29.4 MMOL/L (ref 22–29)
CREAT SERPL-MCNC: 0.82 MG/DL (ref 0.57–1)
DEPRECATED RDW RBC AUTO: 44.9 FL (ref 37–54)
EGFRCR SERPLBLD CKD-EPI 2021: 81.5 ML/MIN/1.73
EOSINOPHIL # BLD AUTO: 0.06 10*3/MM3 (ref 0–0.4)
EOSINOPHIL NFR BLD AUTO: 0.8 % (ref 0.3–6.2)
ERYTHROCYTE [DISTWIDTH] IN BLOOD BY AUTOMATED COUNT: 13.5 % (ref 12.3–15.4)
GLOBULIN UR ELPH-MCNC: 3 GM/DL
GLUCOSE SERPL-MCNC: 88 MG/DL (ref 65–99)
HCT VFR BLD AUTO: 41.6 % (ref 34–46.6)
HGB BLD-MCNC: 13.7 G/DL (ref 12–15.9)
IGG1 SER-MCNC: 1054 MG/DL (ref 700–1600)
IMM GRANULOCYTES # BLD AUTO: 0.01 10*3/MM3 (ref 0–0.05)
IMM GRANULOCYTES NFR BLD AUTO: 0.1 % (ref 0–0.5)
LYMPHOCYTES # BLD AUTO: 2.67 10*3/MM3 (ref 0.7–3.1)
LYMPHOCYTES NFR BLD AUTO: 37.3 % (ref 19.6–45.3)
MCH RBC QN AUTO: 29.7 PG (ref 26.6–33)
MCHC RBC AUTO-ENTMCNC: 32.9 G/DL (ref 31.5–35.7)
MCV RBC AUTO: 90 FL (ref 79–97)
MONOCYTES # BLD AUTO: 0.42 10*3/MM3 (ref 0.1–0.9)
MONOCYTES NFR BLD AUTO: 5.9 % (ref 5–12)
NEUTROPHILS NFR BLD AUTO: 3.99 10*3/MM3 (ref 1.7–7)
NEUTROPHILS NFR BLD AUTO: 55.8 % (ref 42.7–76)
NRBC BLD AUTO-RTO: 0 /100 WBC (ref 0–0.2)
PLATELET # BLD AUTO: 277 10*3/MM3 (ref 140–450)
PMV BLD AUTO: 11.1 FL (ref 6–12)
POTASSIUM SERPL-SCNC: 3.7 MMOL/L (ref 3.5–5.2)
PROT SERPL-MCNC: 7.3 G/DL (ref 6–8.5)
RBC # BLD AUTO: 4.62 10*6/MM3 (ref 3.77–5.28)
SODIUM SERPL-SCNC: 139 MMOL/L (ref 136–145)
WBC NRBC COR # BLD AUTO: 7.16 10*3/MM3 (ref 3.4–10.8)

## 2024-12-24 PROCEDURE — 80053 COMPREHEN METABOLIC PANEL: CPT | Performed by: ALLERGY & IMMUNOLOGY

## 2024-12-24 PROCEDURE — 85025 COMPLETE CBC W/AUTO DIFF WBC: CPT | Performed by: ALLERGY & IMMUNOLOGY

## 2024-12-24 PROCEDURE — 25010000002 IMMUNE GLOBULIN (HUMAN) 5 GM/50ML SOLUTION: Performed by: ALLERGY & IMMUNOLOGY

## 2024-12-24 PROCEDURE — 96366 THER/PROPH/DIAG IV INF ADDON: CPT

## 2024-12-24 PROCEDURE — 25010000002 IMMUNE GLOBULIN (HUMAN) 20 GM/200ML SOLUTION: Performed by: ALLERGY & IMMUNOLOGY

## 2024-12-24 PROCEDURE — 25010000002 IMMUNE GLOBULIN (HUMAN) 10 GM/100ML SOLUTION: Performed by: ALLERGY & IMMUNOLOGY

## 2024-12-24 PROCEDURE — 82784 ASSAY IGA/IGD/IGG/IGM EACH: CPT | Performed by: ALLERGY & IMMUNOLOGY

## 2024-12-24 PROCEDURE — 96365 THER/PROPH/DIAG IV INF INIT: CPT

## 2024-12-24 RX ADMIN — IMMUNE GLOBULIN (HUMAN) 10 G: 10 INJECTION INTRAVENOUS; SUBCUTANEOUS at 10:11

## 2024-12-24 RX ADMIN — IMMUNE GLOBULIN (HUMAN) 20 G: 10 INJECTION INTRAVENOUS; SUBCUTANEOUS at 11:06

## 2024-12-24 RX ADMIN — IMMUNE GLOBULIN (HUMAN) 5 G: 10 INJECTION INTRAVENOUS; SUBCUTANEOUS at 09:39

## 2025-01-02 ENCOUNTER — OFFICE VISIT (OUTPATIENT)
Dept: ORTHOPEDIC SURGERY | Facility: CLINIC | Age: 60
End: 2025-01-02
Payer: MEDICARE

## 2025-01-02 VITALS
SYSTOLIC BLOOD PRESSURE: 137 MMHG | BODY MASS INDEX: 31.07 KG/M2 | DIASTOLIC BLOOD PRESSURE: 88 MMHG | HEART RATE: 71 BPM | OXYGEN SATURATION: 95 % | WEIGHT: 182 LBS | HEIGHT: 64 IN

## 2025-01-02 DIAGNOSIS — S73.191D TEAR OF RIGHT ACETABULAR LABRUM, SUBSEQUENT ENCOUNTER: ICD-10-CM

## 2025-01-02 DIAGNOSIS — M16.11 PRIMARY OSTEOARTHRITIS OF RIGHT HIP: Primary | ICD-10-CM

## 2025-01-02 PROBLEM — S73.191A TEAR OF RIGHT ACETABULAR LABRUM: Status: ACTIVE | Noted: 2025-01-02

## 2025-01-02 RX ORDER — DICLOFENAC SODIUM 75 MG/1
75 TABLET, DELAYED RELEASE ORAL 2 TIMES DAILY
Qty: 180 TABLET | Refills: 0 | Status: SHIPPED | OUTPATIENT
Start: 2025-01-02 | End: 2025-04-02

## 2025-01-02 RX ORDER — CYCLOBENZAPRINE HCL 10 MG
10 TABLET ORAL 3 TIMES DAILY
Qty: 30 TABLET | Refills: 0 | Status: SHIPPED | OUTPATIENT
Start: 2025-01-02 | End: 2025-01-12

## 2025-01-02 NOTE — PROGRESS NOTES
"Chief Complaint  Follow-up of the Right Hip    Subjective          History of Present Illness      Maia Hui is a 59 y.o. female  presents to [facility] for     Patient presents for follow-up evaluation of right hip pain right hip osteoarthritis and labral tear.  She saw Dr. Herrera initially he talked surgical versus conservative treatment.  She states she was able to go to about 3 visits of therapy and she has been doing home exercises at least 3 times a week.  She states the hip is \"doing okay \".  She states it is not better but not worse.  She never received the diclofenac from [facility] they did not fill that for her she states when she stands for too long or sits too long she gets pain in her hip she points to the posterior lateral hip as her areas of pain she cannot tolerate steroid injections otherwise she would consider this as a option for treatment.  She would like to avoid surgical intervention at this time      Allergies   Allergen Reactions    Molds & Smuts Dermatitis    Morphine And Codeine Other (See Comments)     MAKES HER FEEL LIKE MUSCLE TIGHTEN UP AND THEN SHE PASSES OUT    Statins Other (See Comments)     Pain in hands and feet    Sulfamethoxazole-Trimethoprim Other (See Comments)     Crews-Buzz reaction      Pravastatin Myalgia           Acetaminophen Nausea Only    Chicken Meat (Diagnostic) GI Intolerance           Elavil [Amitriptyline] Anxiety    Ibuprofen GI Intolerance     Other reaction(s): severe GI upset        Social History     Socioeconomic History    Marital status:     Number of children: 1    Years of education: College Graduate   Tobacco Use    Smoking status: Former     Current packs/day: 0.50     Average packs/day: 0.5 packs/day for 15.0 years (7.5 ttl pk-yrs)     Types: Cigarettes    Smokeless tobacco: Never   Vaping Use    Vaping status: Never Used   Substance and Sexual Activity    Alcohol use: No    Drug use: Never    " "Sexual activity: Defer        REVIEW OF SYSTEMS    Constitutional: Awake alert and oriented x3, no acute distress, denies fevers, chills, weight loss  Respiratory: No respiratory distress  Vascular: Brisk cap refill, Intact distal pulses, No cyanosis, compartments soft with no signs or symptoms of compartment syndrome or DVT.   Cardiovascular: Denies chest pain, shortness of breath  Skin: Denies rashes, acute skin changes  Neurologic: Denies headache, loss of consciousness  MSK: Right hip pain      Objective   Vital Signs:   /88   Pulse 71   Ht 162.6 cm (64.02\")   Wt 82.6 kg (182 lb)   SpO2 95%   BMI 31.22 kg/m²     Body mass index is 31.22 kg/m².    Physical Exam       Right lower extremity: flexion to 90 degrees, external rotation  to 20 degrees, internal rotation to 10 degrees, negative  straight leg raise, equal leg lengths, tender to the lateral hip, distal neurovascularly intact, calf soft, positive  pulses, positive EHL, FHL, GS, and TA. Sensation intact to all 5 nerves of the foot.           Procedures    Imaging Results (Most Recent)       None             Result Review :   The following data was reviewed by: DEVIN Boo on 01/02/2025:               Assessment and Plan    Diagnoses and all orders for this visit:    1. Primary osteoarthritis of right hip (Primary)    2. Tear of right acetabular labrum, subsequent encounter    Other orders  -     diclofenac (VOLTAREN) 75 MG EC tablet; Take 1 tablet by mouth 2 (Two) Times a Day for 90 days.  Dispense: 180 tablet; Refill: 0  -     cyclobenzaprine (FLEXERIL) 10 MG tablet; Take 1 tablet by mouth 3 times a day for 10 days.  Dispense: 30 tablet; Refill: 0        Discussed diagnosis and treatment options with the patient she was advised we can give pain cream prescription diclofenac refill and muscle relaxer Flexeril.  She agreed.  We advised her to avoid driving working or operating heavy machinery while on Flexeril.  We discussed if " patient would like to consider surgical intervention in the future to call back otherwise follow-up as needed  Call or return if worsening symptoms.    Follow Up   Return if symptoms worsen or fail to improve.  Patient was given instructions and counseling regarding her condition or for health maintenance advice. Please see specific information pulled into the AVS if appropriate.       EMR Dragon/Transcription disclaimer:  Part of this note may be an electronic transcription/translation of spoken language to printed text using the Dragon Dictation System

## 2025-01-07 ENCOUNTER — TRANSCRIBE ORDERS (OUTPATIENT)
Dept: ADMINISTRATIVE | Facility: HOSPITAL | Age: 60
End: 2025-01-07
Payer: MEDICARE

## 2025-01-07 DIAGNOSIS — D83.9 COMMON VARIABLE IMMUNODEFICIENCY: Primary | ICD-10-CM

## 2025-01-29 RX ORDER — DIPHENHYDRAMINE HCL 25 MG
50 CAPSULE ORAL ONCE AS NEEDED
Status: CANCELLED
Start: 2025-01-29

## 2025-01-30 ENCOUNTER — HOSPITAL ENCOUNTER (OUTPATIENT)
Dept: INFUSION THERAPY | Facility: HOSPITAL | Age: 60
Discharge: HOME | End: 2025-01-30
Payer: MEDICARE

## 2025-01-30 VITALS
TEMPERATURE: 98 F | RESPIRATION RATE: 18 BRPM | SYSTOLIC BLOOD PRESSURE: 127 MMHG | DIASTOLIC BLOOD PRESSURE: 88 MMHG | HEART RATE: 76 BPM | BODY MASS INDEX: 30.98 KG/M2 | WEIGHT: 181.44 LBS | HEIGHT: 64 IN | OXYGEN SATURATION: 99 %

## 2025-01-30 DIAGNOSIS — D83.9 COMMON VARIABLE IMMUNODEFICIENCY: Primary | ICD-10-CM

## 2025-01-30 PROCEDURE — 25010000002 IMMUNE GLOBULIN (HUMAN) 5 GM/50ML SOLUTION: Performed by: ALLERGY & IMMUNOLOGY

## 2025-01-30 PROCEDURE — 96366 THER/PROPH/DIAG IV INF ADDON: CPT

## 2025-01-30 PROCEDURE — 96365 THER/PROPH/DIAG IV INF INIT: CPT

## 2025-01-30 PROCEDURE — 25010000002 IMMUNE GLOBULIN (HUMAN) 10 GM/100ML SOLUTION: Performed by: ALLERGY & IMMUNOLOGY

## 2025-01-30 PROCEDURE — 25010000002 IMMUNE GLOBULIN (HUMAN) 20 GM/200ML SOLUTION: Performed by: ALLERGY & IMMUNOLOGY

## 2025-01-30 RX ORDER — DIPHENHYDRAMINE HCL 25 MG
50 CAPSULE ORAL ONCE AS NEEDED
Start: 2025-02-27

## 2025-01-30 RX ADMIN — IMMUNE GLOBULIN (HUMAN) 10 G: 10 INJECTION INTRAVENOUS; SUBCUTANEOUS at 10:20

## 2025-01-30 RX ADMIN — IMMUNE GLOBULIN (HUMAN) 5 G: 10 INJECTION INTRAVENOUS; SUBCUTANEOUS at 09:37

## 2025-01-30 RX ADMIN — IMMUNE GLOBULIN (HUMAN) 20 G: 10 INJECTION INTRAVENOUS; SUBCUTANEOUS at 11:12

## 2025-02-13 DIAGNOSIS — G47.419 NARCOLEPSY WITHOUT CATAPLEXY: ICD-10-CM

## 2025-02-14 RX ORDER — DEXTROAMPHETAMINE SACCHARATE, AMPHETAMINE ASPARTATE MONOHYDRATE, DEXTROAMPHETAMINE SULFATE AND AMPHETAMINE SULFATE 5; 5; 5; 5 MG/1; MG/1; MG/1; MG/1
20 CAPSULE, EXTENDED RELEASE ORAL EVERY MORNING
Qty: 90 CAPSULE | Refills: 0 | Status: SHIPPED | OUTPATIENT
Start: 2025-02-14

## 2025-02-14 RX ORDER — DEXTROAMPHETAMINE SACCHARATE, AMPHETAMINE ASPARTATE, DEXTROAMPHETAMINE SULFATE AND AMPHETAMINE SULFATE 2.5; 2.5; 2.5; 2.5 MG/1; MG/1; MG/1; MG/1
10 TABLET ORAL
Qty: 90 TABLET | Refills: 0 | Status: SHIPPED | OUTPATIENT
Start: 2025-02-14

## 2025-02-17 ENCOUNTER — OFFICE VISIT (OUTPATIENT)
Dept: CARDIOLOGY | Facility: CLINIC | Age: 60
End: 2025-02-17
Payer: MEDICARE

## 2025-02-17 VITALS
BODY MASS INDEX: 30.56 KG/M2 | DIASTOLIC BLOOD PRESSURE: 84 MMHG | WEIGHT: 179 LBS | HEIGHT: 64 IN | SYSTOLIC BLOOD PRESSURE: 124 MMHG

## 2025-02-17 DIAGNOSIS — R00.2 PALPITATIONS: Primary | ICD-10-CM

## 2025-02-17 DIAGNOSIS — I49.3 PVC (PREMATURE VENTRICULAR CONTRACTION): ICD-10-CM

## 2025-02-17 DIAGNOSIS — Z82.49 FAMILY HISTORY OF EARLY CAD: ICD-10-CM

## 2025-02-17 DIAGNOSIS — R06.02 SOB (SHORTNESS OF BREATH): ICD-10-CM

## 2025-02-17 PROCEDURE — G2211 COMPLEX E/M VISIT ADD ON: HCPCS | Performed by: INTERNAL MEDICINE

## 2025-02-17 PROCEDURE — 99214 OFFICE O/P EST MOD 30 MIN: CPT | Performed by: INTERNAL MEDICINE

## 2025-02-17 NOTE — ASSESSMENT & PLAN NOTE
Patient with increased heart palpitations most likely benign PACs or PVCs but also some increased shortness of breath we will go ahead and repeat her echocardiogram to see if any change in LV function or not

## 2025-02-17 NOTE — ASSESSMENT & PLAN NOTE
Patient with a family history of a brother with premature CAD and she has has been dyslipidemic but intolerant to statins recommended today calcium scoring as a means to better assess her risk as well as decide about possible Repatha/PSK 9 inhibitor treatment to help minimize and lower her risk in the future.

## 2025-02-17 NOTE — ASSESSMENT & PLAN NOTE
Patient with heart palpitation symptoms previously low frequency PACs and PVCs sounds most consistent with what she is experiencing right now we will go ahead and repeat 48-hour Holter monitor to evaluate frequency did discuss with her the potential pharmacologic treatment of beta-blocker or calcium channel blocker she feels overall that the symptoms are not significantly bothersome to try medication at this point.  Certainly can try over-the-counter magnesium and would avoid stimulants if possible

## 2025-02-17 NOTE — PROGRESS NOTES
Chief Complaint  Follow-up and Palpitations    Subjective    Patient is following back up due to currents of heart palpitations more frequently and nights with some abnormal sensation in her chest with that followed by a pause.  The spells have been in the past but now more frequent in nature she also does report some shortness of breath when putting her CPAP on at night she denies any PND orthopnea.  She does have mild lower extremity edema problems.  No syncope or presyncopal episodes.    Past Medical History:   Diagnosis Date    Asthma     MILD    Chronic EBV infection     Combined immunodeficiency disorder     RECEIVES IVIG EVERY 4 WEEKS    Cutaneous lupus erythematosus     Depression     MILD    Fibromyalgia     GERD (gastroesophageal reflux disease)     Hematoma     LEFT THIGH. FROM FALL. WILL LET DR SANDOVAL KNOW. HEALING    History of shingles     Hypercholesteremia     Hypertension     Narcolepsy     PONV (postoperative nausea and vomiting)     Brenda Sanchez auricular syndrome     Rib pain     RESOLVING FROM FALL. HAD XRAY. NORMAL POST FALL. MD AWARE OF    Sjogren's disease     Sleep apnea     CPAP    Uterovaginal prolapse          Current Outpatient Medications:     acyclovir (ZOVIRAX) 200 MG capsule, Take 1 capsule by mouth 2 (Two) Times a Day., Disp: , Rfl:     ammonium lactate (LAC-HYDRIN) 12 % lotion, Apply 1 application topically to the appropriate area as directed As Needed for Dry Skin., Disp: , Rfl:     amphetamine-dextroamphetamine (Adderall) 10 MG tablet, Take 1 tablet by mouth Daily With Lunch., Disp: 90 tablet, Rfl: 0    amphetamine-dextroamphetamine XR (ADDERALL XR) 20 MG 24 hr capsule, Take 1 capsule by mouth Every Morning, Disp: 90 capsule, Rfl: 0    Biotin 20032 MCG tablet, Take 1 tablet by mouth Daily., Disp: , Rfl:     cyanocobalamin 1000 MCG/ML injection, , Disp: , Rfl:     Deep Sea Nasal Spray 0.65 % nasal spray, , Disp: , Rfl:     diclofenac (VOLTAREN) 75 MG EC tablet, Take 1 tablet by  mouth 2 (Two) Times a Day for 90 days., Disp: 180 tablet, Rfl: 0    EVENING PRIMROSE OIL PO, Take 1 tablet by mouth Every Evening. HOLD FOR SURGERY., Disp: , Rfl:     fexofenadine (ALLEGRA) 180 MG tablet, , Disp: , Rfl:     Ibuprofen 3 %, Gabapentin 10 %, Baclofen 2 %, lidocaine 4 %, Ketamine HCl 4 %, Apply 1-2 g topically to the appropriate area as directed 3 (Three) to 4 (Four) times daily., Disp: 90 g, Rfl: 3    immune globulin, human, (Gammagard) 1 GM/10ML solution infusion, 30 grams intravenously every 4 weeks for 28 day(s), Disp: , Rfl:     ipratropium (ATROVENT) 0.06 % nasal spray, 2 sprays into the nostril(s) as directed by provider 3 (Three) Times a Day., Disp: 15 mL, Rfl: 10    Lactobacillus Acid-Pectin (Acidophilus/Citrus Pectin) tablet tablet, Take 1 tablet by mouth Daily., Disp: , Rfl:     loratadine (CLARITIN) 10 MG tablet, Take 1 tablet by mouth Daily., Disp: 30 tablet, Rfl: 20    losartan-hydrochlorothiazide (HYZAAR) 100-12.5 MG per tablet, Take 1 tablet by mouth Daily., Disp: , Rfl:     montelukast (SINGULAIR) 10 MG tablet, Take 1 tablet by mouth Daily., Disp: , Rfl:     Narcan 4 MG/0.1ML nasal spray, Administer 1 spray into the nostril(s) as directed by provider As Needed (FOR OD)., Disp: , Rfl:     olopatadine (PATANOL) 0.1 % ophthalmic solution, , Disp: , Rfl:     traMADol (ULTRAM) 50 MG tablet, Take 1 tablet by mouth 2 (Two) Times a Day As Needed for Moderate Pain ., Disp: 30 tablet, Rfl: 0    Turmeric Curcumin 500 MG capsule, Take 500 mg by mouth 2 (Two) Times a Day. WILL HOLD FOR SURGERY, Disp: , Rfl:     vitamin D (ERGOCALCIFEROL) 1.25 MG (87964 UT) capsule capsule, Take 1 capsule by mouth Every 7 (Seven) Days. SUNDAYS, Disp: , Rfl:     Zinc 50 MG tablet, Take 1 tablet by mouth Daily., Disp: , Rfl:     fluticasone (FLONASE) 50 MCG/ACT nasal spray, Administer 2 sprays into the nostril(s) as directed by provider Daily. (Patient not taking: Reported on 2/17/2025), Disp: , Rfl:     There are no  "discontinued medications.  Allergies   Allergen Reactions    Molds & Smuts Dermatitis    Morphine And Codeine Other (See Comments)     MAKES HER FEEL LIKE MUSCLE TIGHTEN UP AND THEN SHE PASSES OUT    Statins Other (See Comments)     Pain in hands and feet    Sulfamethoxazole-Trimethoprim Other (See Comments)     Crews-Buzz reaction      Pravastatin Myalgia           Acetaminophen Nausea Only    Chicken Meat (Diagnostic) GI Intolerance           Elavil [Amitriptyline] Anxiety    Ibuprofen GI Intolerance     Other reaction(s): severe GI upset        Social History     Tobacco Use    Smoking status: Former     Current packs/day: 0.50     Average packs/day: 0.5 packs/day for 15.0 years (7.5 ttl pk-yrs)     Types: Cigarettes    Smokeless tobacco: Never   Vaping Use    Vaping status: Never Used   Substance Use Topics    Alcohol use: No    Drug use: Never       Family History   Problem Relation Age of Onset    Hypertension Mother     Hyperlipidemia Mother     Heart disease Mother     Heart disease Father     Hypertension Father     Heart disease Brother     Malig Hyperthermia Neg Hx         Objective     /84 (BP Location: Right arm)   Ht 162.6 cm (64\")   Wt 81.2 kg (179 lb)   BMI 30.73 kg/m²       Physical Exam    General Appearance:   no acute distress  Alert and oriented x3  HENT:   lips not cyanotic  Atraumatic  Neck:  No jvd   supple  Respiratory:  no respiratory distress  normal breath sounds  no rales  Cardiovascular:  Regular rate and rhythm  no S3, no S4   no murmur  no rub  Extremities  No cyanosis  lower extremity edema: none    Skin:   warm, dry  No rashes      Result Review :     No results found for: \"PROBNP\"  CMP          7/9/2024    09:31 12/24/2024    09:12   CMP   Glucose 101  88    BUN 16  16    Creatinine 0.70  0.82    EGFR 98.5  81.5    Sodium 140  139    Potassium 3.7  3.7    Chloride 102  100    Calcium 9.0  9.5    Total Protein 6.9  7.3    Albumin 4.3  4.3    Globulin 2.6  3.0    Total " "Bilirubin 0.3  0.4    Alkaline Phosphatase 139  124    AST (SGOT) 18  15    ALT (SGPT) 7  10    Albumin/Globulin Ratio 1.7  1.4    BUN/Creatinine Ratio 22.9  19.5    Anion Gap 8.5  9.6      CBC w/diff          7/9/2024    09:31 12/24/2024    09:12   CBC w/Diff   WBC 6.80  7.16    RBC 4.54  4.62    Hemoglobin 13.7  13.7    Hematocrit 40.8  41.6    MCV 89.9  90.0    MCH 30.2  29.7    MCHC 33.6  32.9    RDW 13.2  13.5    Platelets 274  277    Neutrophil Rel % 56.7  55.8    Immature Granulocyte Rel % 0.4  0.1    Lymphocyte Rel % 35.7  37.3    Monocyte Rel % 6.0  5.9    Eosinophil Rel % 0.9  0.8    Basophil Rel % 0.3  0.1       No results found for: \"TSH\"   No results found for: \"FREET4\"   No results found for: \"DDIMERQUANT\"  No results found for: \"MG\"   No results found for: \"DIGOXIN\"   No results found for: \"TROPONINT\"          No results found for: \"POCTROP\"    Results for orders placed in visit on 05/12/22    Adult Transthoracic Echo Complete W/ Cont if Necessary Per Protocol    Interpretation Summary  · Calculated left ventricular EF = 62% Estimated left ventricular EF was in agreement with the calculated left ventricular EF.                 Diagnoses and all orders for this visit:    1. Palpitations (Primary)  Assessment & Plan:  Patient with heart palpitation symptoms previously low frequency PACs and PVCs sounds most consistent with what she is experiencing right now we will go ahead and repeat 48-hour Holter monitor to evaluate frequency did discuss with her the potential pharmacologic treatment of beta-blocker or calcium channel blocker she feels overall that the symptoms are not significantly bothersome to try medication at this point.  Certainly can try over-the-counter magnesium and would avoid stimulants if possible      2. SOB (shortness of breath)  Assessment & Plan:  Patient with increased heart palpitations most likely benign PACs or PVCs but also some increased shortness of breath we will go ahead and " repeat her echocardiogram to see if any change in LV function or not    Orders:  -     Adult Transthoracic Echo Complete W/ Cont if Necessary Per Protocol; Future  -     Holter Monitor - 48 Hour; Future    3. Family history of early CAD  Assessment & Plan:  Patient with a family history of a brother with premature CAD and she has has been dyslipidemic but intolerant to statins recommended today calcium scoring as a means to better assess her risk as well as decide about possible Repatha/PSK 9 inhibitor treatment to help minimize and lower her risk in the future.    Orders:  -     CT Cardiac Calcium Score Without Dye; Future    4. PVC (premature ventricular contraction)            Follow Up     Return in about 6 months (around 8/17/2025) for Follow with Eva Sexton.          Patient was given instructions and counseling regarding her condition or for health maintenance advice. Please see specific information pulled into the AVS if appropriate.     .

## 2025-02-27 ENCOUNTER — HOSPITAL ENCOUNTER (OUTPATIENT)
Facility: HOSPITAL | Age: 60
Discharge: HOME | End: 2025-02-27
Payer: MEDICARE

## 2025-02-27 ENCOUNTER — HOSPITAL ENCOUNTER (OUTPATIENT)
Dept: INFUSION THERAPY | Facility: HOSPITAL | Age: 60
Discharge: HOME | End: 2025-02-27
Payer: MEDICARE

## 2025-02-27 VITALS
DIASTOLIC BLOOD PRESSURE: 90 MMHG | HEART RATE: 80 BPM | TEMPERATURE: 98 F | OXYGEN SATURATION: 99 % | SYSTOLIC BLOOD PRESSURE: 142 MMHG | RESPIRATION RATE: 18 BRPM | HEIGHT: 64 IN | BODY MASS INDEX: 31.16 KG/M2 | WEIGHT: 182.54 LBS

## 2025-02-27 DIAGNOSIS — D83.9 COMMON VARIABLE IMMUNODEFICIENCY: Primary | ICD-10-CM

## 2025-02-27 DIAGNOSIS — R06.02 SOB (SHORTNESS OF BREATH): ICD-10-CM

## 2025-02-27 PROCEDURE — 96366 THER/PROPH/DIAG IV INF ADDON: CPT

## 2025-02-27 PROCEDURE — 25010000002 IMMUNE GLOBULIN (HUMAN) 20 GM/200ML SOLUTION: Performed by: ALLERGY & IMMUNOLOGY

## 2025-02-27 PROCEDURE — 96365 THER/PROPH/DIAG IV INF INIT: CPT

## 2025-02-27 PROCEDURE — 25010000002 IMMUNE GLOBULIN (HUMAN) 5 GM/50ML SOLUTION: Performed by: ALLERGY & IMMUNOLOGY

## 2025-02-27 PROCEDURE — 25010000002 IMMUNE GLOBULIN (HUMAN) 10 GM/100ML SOLUTION: Performed by: ALLERGY & IMMUNOLOGY

## 2025-02-27 PROCEDURE — 93306 TTE W/DOPPLER COMPLETE: CPT

## 2025-02-27 RX ORDER — DIPHENHYDRAMINE HCL 25 MG
50 CAPSULE ORAL ONCE AS NEEDED
Start: 2025-03-27

## 2025-02-27 RX ADMIN — IMMUNE GLOBULIN (HUMAN) 20 G: 10 INJECTION INTRAVENOUS; SUBCUTANEOUS at 14:33

## 2025-02-27 RX ADMIN — IMMUNE GLOBULIN (HUMAN) 10 G: 10 INJECTION INTRAVENOUS; SUBCUTANEOUS at 13:38

## 2025-02-27 RX ADMIN — IMMUNE GLOBULIN (HUMAN) 5 G: 10 INJECTION INTRAVENOUS; SUBCUTANEOUS at 12:55

## 2025-03-03 ENCOUNTER — TELEPHONE (OUTPATIENT)
Dept: CARDIOLOGY | Facility: CLINIC | Age: 60
End: 2025-03-03
Payer: MEDICARE

## 2025-03-03 LAB
AV MEAN PRESS GRAD SYS DOP V1V2: 3.5 MMHG
AV VMAX SYS DOP: 121.8 CM/SEC
BH CV ECHO MEAS - ACS: 1.66 CM
BH CV ECHO MEAS - AO MAX PG: 5.9 MMHG
BH CV ECHO MEAS - AO ROOT DIAM: 3 CM
BH CV ECHO MEAS - AO V2 VTI: 22.3 CM
BH CV ECHO MEAS - AVA(I,D): 2.46 CM2
BH CV ECHO MEAS - EDV(CUBED): 100 ML
BH CV ECHO MEAS - EDV(MOD-SP2): 64.3 ML
BH CV ECHO MEAS - EDV(MOD-SP4): 88.8 ML
BH CV ECHO MEAS - EF(MOD-SP2): 62.8 %
BH CV ECHO MEAS - EF(MOD-SP4): 65.5 %
BH CV ECHO MEAS - ESV(CUBED): 27.7 ML
BH CV ECHO MEAS - ESV(MOD-SP2): 23.9 ML
BH CV ECHO MEAS - ESV(MOD-SP4): 30.6 ML
BH CV ECHO MEAS - FS: 34.8 %
BH CV ECHO MEAS - IVS/LVPW: 1.02 CM
BH CV ECHO MEAS - IVSD: 1.05 CM
BH CV ECHO MEAS - LA DIMENSION: 3.5 CM
BH CV ECHO MEAS - LAT PEAK E' VEL: 10.1 CM/SEC
BH CV ECHO MEAS - LV DIASTOLIC VOL/BSA (35-75): 47.6 CM2
BH CV ECHO MEAS - LV MASS(C)D: 169.7 GRAMS
BH CV ECHO MEAS - LV MAX PG: 4.1 MMHG
BH CV ECHO MEAS - LV MEAN PG: 2.38 MMHG
BH CV ECHO MEAS - LV SYSTOLIC VOL/BSA (12-30): 16.4 CM2
BH CV ECHO MEAS - LV V1 MAX: 101.2 CM/SEC
BH CV ECHO MEAS - LV V1 VTI: 20.3 CM
BH CV ECHO MEAS - LVIDD: 4.6 CM
BH CV ECHO MEAS - LVIDS: 3 CM
BH CV ECHO MEAS - LVOT AREA: 2.7 CM2
BH CV ECHO MEAS - LVOT DIAM: 1.86 CM
BH CV ECHO MEAS - LVPWD: 1.03 CM
BH CV ECHO MEAS - MED PEAK E' VEL: 7 CM/SEC
BH CV ECHO MEAS - MV A MAX VEL: 75.8 CM/SEC
BH CV ECHO MEAS - MV DEC SLOPE: 201.1 CM/SEC2
BH CV ECHO MEAS - MV DEC TIME: 0.33 SEC
BH CV ECHO MEAS - MV E MAX VEL: 61.3 CM/SEC
BH CV ECHO MEAS - MV E/A: 0.81
BH CV ECHO MEAS - MV MAX PG: 2.01 MMHG
BH CV ECHO MEAS - MV MEAN PG: 1.05 MMHG
BH CV ECHO MEAS - MV P1/2T: 97.7 MSEC
BH CV ECHO MEAS - MV V2 VTI: 20.5 CM
BH CV ECHO MEAS - MVA(P1/2T): 2.25 CM2
BH CV ECHO MEAS - MVA(VTI): 2.7 CM2
BH CV ECHO MEAS - PA V2 MAX: 108.6 CM/SEC
BH CV ECHO MEAS - RAP SYSTOLE: 3 MMHG
BH CV ECHO MEAS - RVDD: 3.9 CM
BH CV ECHO MEAS - RVSP: 24.9 MMHG
BH CV ECHO MEAS - SV(LVOT): 55 ML
BH CV ECHO MEAS - SV(MOD-SP2): 40.4 ML
BH CV ECHO MEAS - SV(MOD-SP4): 58.2 ML
BH CV ECHO MEAS - SVI(LVOT): 29.5 ML/M2
BH CV ECHO MEAS - SVI(MOD-SP2): 21.7 ML/M2
BH CV ECHO MEAS - SVI(MOD-SP4): 31.2 ML/M2
BH CV ECHO MEAS - TAPSE (>1.6): 1.82 CM
BH CV ECHO MEAS - TR MAX PG: 21.9 MMHG
BH CV ECHO MEAS - TR MAX VEL: 234.1 CM/SEC
BH CV ECHO MEASUREMENTS AVERAGE E/E' RATIO: 7.17
BH CV XLRA - RV BASE: 3.9 CM
BH CV XLRA - RV MID: 2.8 CM
BH CV XLRA - TDI S': 10.4 CM/SEC
IVRT: 40 MS
LEFT ATRIUM VOLUME INDEX: 26.3 ML/M2
LV EF BIPLANE MOD: 64.1 %

## 2025-03-03 NOTE — TELEPHONE ENCOUNTER
----- Message from Eva Sexton sent at 3/3/2025  3:55 PM EST -----  Echocardiogram shows normal heart muscle function and no significant valve disease noted. Continue with holter as scheduled. Notify office of any new/worsening cardiac symptoms

## 2025-03-03 NOTE — TELEPHONE ENCOUNTER
Per Eva:  Echocardiogram shows normal heart muscle function and no significant valve disease noted. Continue with holter as scheduled. Notify office of any new/worsening cardiac symptoms     Left message for patient to call office.

## 2025-03-11 ENCOUNTER — HOSPITAL ENCOUNTER (EMERGENCY)
Facility: HOSPITAL | Age: 60
Discharge: HOME | End: 2025-03-12
Attending: EMERGENCY MEDICINE
Payer: MEDICARE

## 2025-03-11 DIAGNOSIS — A49.9 UTI (URINARY TRACT INFECTION), BACTERIAL: Primary | ICD-10-CM

## 2025-03-11 DIAGNOSIS — N39.0 UTI (URINARY TRACT INFECTION), BACTERIAL: Primary | ICD-10-CM

## 2025-03-11 LAB
ALBUMIN SERPL-MCNC: 4.2 G/DL (ref 3.5–5.2)
ALBUMIN/GLOB SERPL: 1.1 G/DL
ALP SERPL-CCNC: 124 U/L (ref 39–117)
ALT SERPL W P-5'-P-CCNC: 12 U/L (ref 1–33)
ANION GAP SERPL CALCULATED.3IONS-SCNC: 10.2 MMOL/L (ref 5–15)
AST SERPL-CCNC: 17 U/L (ref 1–32)
BACTERIA UR QL AUTO: ABNORMAL /HPF
BASOPHILS # BLD AUTO: 0.02 10*3/MM3 (ref 0–0.2)
BASOPHILS NFR BLD AUTO: 0.2 % (ref 0–1.5)
BILIRUB SERPL-MCNC: 0.3 MG/DL (ref 0–1.2)
BILIRUB UR QL STRIP: NEGATIVE
BUN SERPL-MCNC: 18 MG/DL (ref 8–23)
BUN/CREAT SERPL: 19.1 (ref 7–25)
CALCIUM SPEC-SCNC: 9.8 MG/DL (ref 8.6–10.5)
CHLORIDE SERPL-SCNC: 99 MMOL/L (ref 98–107)
CLARITY UR: CLEAR
CO2 SERPL-SCNC: 29.8 MMOL/L (ref 22–29)
COLOR UR: YELLOW
CREAT SERPL-MCNC: 0.94 MG/DL (ref 0.57–1)
D-LACTATE SERPL-SCNC: 1 MMOL/L (ref 0.5–2)
DEPRECATED RDW RBC AUTO: 43.4 FL (ref 37–54)
EGFRCR SERPLBLD CKD-EPI 2021: 68.7 ML/MIN/1.73
EOSINOPHIL # BLD AUTO: 0.04 10*3/MM3 (ref 0–0.4)
EOSINOPHIL NFR BLD AUTO: 0.5 % (ref 0.3–6.2)
ERYTHROCYTE [DISTWIDTH] IN BLOOD BY AUTOMATED COUNT: 13.2 % (ref 12.3–15.4)
FLUAV SUBTYP SPEC NAA+PROBE: NOT DETECTED
FLUBV RNA ISLT QL NAA+PROBE: NOT DETECTED
GLOBULIN UR ELPH-MCNC: 3.7 GM/DL
GLUCOSE SERPL-MCNC: 106 MG/DL (ref 65–99)
GLUCOSE UR STRIP-MCNC: NEGATIVE MG/DL
HCT VFR BLD AUTO: 43.2 % (ref 34–46.6)
HGB BLD-MCNC: 14.4 G/DL (ref 12–15.9)
HGB UR QL STRIP.AUTO: ABNORMAL
HOLD SPECIMEN: NORMAL
HOLD SPECIMEN: NORMAL
HYALINE CASTS UR QL AUTO: ABNORMAL /LPF
IMM GRANULOCYTES # BLD AUTO: 0.02 10*3/MM3 (ref 0–0.05)
IMM GRANULOCYTES NFR BLD AUTO: 0.2 % (ref 0–0.5)
KETONES UR QL STRIP: NEGATIVE
LEUKOCYTE ESTERASE UR QL STRIP.AUTO: ABNORMAL
LIPASE SERPL-CCNC: 31 U/L (ref 13–60)
LYMPHOCYTES # BLD AUTO: 3.32 10*3/MM3 (ref 0.7–3.1)
LYMPHOCYTES NFR BLD AUTO: 41.4 % (ref 19.6–45.3)
MCH RBC QN AUTO: 30.1 PG (ref 26.6–33)
MCHC RBC AUTO-ENTMCNC: 33.3 G/DL (ref 31.5–35.7)
MCV RBC AUTO: 90.4 FL (ref 79–97)
MONOCYTES # BLD AUTO: 0.46 10*3/MM3 (ref 0.1–0.9)
MONOCYTES NFR BLD AUTO: 5.7 % (ref 5–12)
NEUTROPHILS NFR BLD AUTO: 4.16 10*3/MM3 (ref 1.7–7)
NEUTROPHILS NFR BLD AUTO: 52 % (ref 42.7–76)
NITRITE UR QL STRIP: NEGATIVE
NRBC BLD AUTO-RTO: 0 /100 WBC (ref 0–0.2)
PH UR STRIP.AUTO: 5.5 [PH] (ref 5–8)
PLATELET # BLD AUTO: 274 10*3/MM3 (ref 140–450)
PMV BLD AUTO: 11.3 FL (ref 6–12)
POTASSIUM SERPL-SCNC: 3.9 MMOL/L (ref 3.5–5.2)
PROT SERPL-MCNC: 7.9 G/DL (ref 6–8.5)
PROT UR QL STRIP: NEGATIVE
RBC # BLD AUTO: 4.78 10*6/MM3 (ref 3.77–5.28)
RBC # UR STRIP: ABNORMAL /HPF
REF LAB TEST METHOD: ABNORMAL
RSV RNA NPH QL NAA+NON-PROBE: NOT DETECTED
SARS-COV-2 RNA RESP QL NAA+PROBE: NOT DETECTED
SODIUM SERPL-SCNC: 139 MMOL/L (ref 136–145)
SP GR UR STRIP: 1.01 (ref 1–1.03)
SQUAMOUS #/AREA URNS HPF: ABNORMAL /HPF
UROBILINOGEN UR QL STRIP: ABNORMAL
WBC # UR STRIP: ABNORMAL /HPF
WBC NRBC COR # BLD AUTO: 8.02 10*3/MM3 (ref 3.4–10.8)
WHOLE BLOOD HOLD COAG: NORMAL
WHOLE BLOOD HOLD SPECIMEN: NORMAL

## 2025-03-11 PROCEDURE — 81001 URINALYSIS AUTO W/SCOPE: CPT

## 2025-03-11 PROCEDURE — 80053 COMPREHEN METABOLIC PANEL: CPT

## 2025-03-11 PROCEDURE — 85025 COMPLETE CBC W/AUTO DIFF WBC: CPT

## 2025-03-11 PROCEDURE — 99284 EMERGENCY DEPT VISIT MOD MDM: CPT

## 2025-03-11 PROCEDURE — 36415 COLL VENOUS BLD VENIPUNCTURE: CPT

## 2025-03-11 PROCEDURE — 87480 CANDIDA DNA DIR PROBE: CPT

## 2025-03-11 PROCEDURE — 87637 SARSCOV2&INF A&B&RSV AMP PRB: CPT

## 2025-03-11 PROCEDURE — 83690 ASSAY OF LIPASE: CPT

## 2025-03-11 PROCEDURE — 87510 GARDNER VAG DNA DIR PROBE: CPT

## 2025-03-11 PROCEDURE — 83605 ASSAY OF LACTIC ACID: CPT

## 2025-03-11 PROCEDURE — 87660 TRICHOMONAS VAGIN DIR PROBE: CPT

## 2025-03-11 RX ORDER — PHENAZOPYRIDINE HYDROCHLORIDE 100 MG/1
100 TABLET, FILM COATED ORAL 3 TIMES DAILY PRN
Qty: 6 TABLET | Refills: 0 | Status: SHIPPED | OUTPATIENT
Start: 2025-03-11 | End: 2025-03-13

## 2025-03-11 RX ORDER — SODIUM CHLORIDE 0.9 % (FLUSH) 0.9 %
10 SYRINGE (ML) INJECTION AS NEEDED
Status: DISCONTINUED | OUTPATIENT
Start: 2025-03-11 | End: 2025-03-12 | Stop reason: HOSPADM

## 2025-03-11 RX ORDER — NITROFURANTOIN 25; 75 MG/1; MG/1
100 CAPSULE ORAL ONCE
Status: COMPLETED | OUTPATIENT
Start: 2025-03-12 | End: 2025-03-11

## 2025-03-11 RX ORDER — NITROFURANTOIN 25; 75 MG/1; MG/1
100 CAPSULE ORAL 2 TIMES DAILY
Qty: 10 CAPSULE | Refills: 0 | Status: SHIPPED | OUTPATIENT
Start: 2025-03-11 | End: 2025-03-16

## 2025-03-11 RX ORDER — PHENAZOPYRIDINE HYDROCHLORIDE 100 MG/1
200 TABLET, FILM COATED ORAL ONCE
Status: COMPLETED | OUTPATIENT
Start: 2025-03-12 | End: 2025-03-11

## 2025-03-11 RX ORDER — FLUCONAZOLE 150 MG/1
150 TABLET ORAL ONCE
Qty: 1 TABLET | Refills: 0 | Status: SHIPPED | OUTPATIENT
Start: 2025-03-12 | End: 2025-03-12

## 2025-03-11 RX ADMIN — PHENAZOPYRIDINE HYDROCHLORIDE 200 MG: 100 TABLET ORAL at 23:37

## 2025-03-11 RX ADMIN — NITROFURANTOIN MONOHYDRATE/MACROCRYSTALS 100 MG: 75; 25 CAPSULE ORAL at 23:37

## 2025-03-12 VITALS
TEMPERATURE: 98.1 F | RESPIRATION RATE: 16 BRPM | DIASTOLIC BLOOD PRESSURE: 106 MMHG | WEIGHT: 182.1 LBS | HEART RATE: 53 BPM | OXYGEN SATURATION: 100 % | HEIGHT: 64 IN | SYSTOLIC BLOOD PRESSURE: 155 MMHG | BODY MASS INDEX: 31.09 KG/M2

## 2025-03-12 LAB
CANDIDA SPECIES: NEGATIVE
GARDNERELLA VAGINALIS: NEGATIVE
T VAGINALIS DNA VAG QL PROBE+SIG AMP: NEGATIVE

## 2025-03-12 NOTE — ED PROVIDER NOTES
Time: 8:45 PM EDT  Date of encounter:  3/11/2025  Independent Historian/Clinical History and Information was obtained by:   Patient    History is limited by: N/A    Chief Complaint: Back pain      History of Present Illness:  Patient is a 60 y.o. year old female who presents to the emergency department for evaluation of back pain.  Patient states that she feels like she is starting to get sick because when she went to  a sick she had an increase in back pain.  Patient has been having some dysuria.  Patient states that she went to go wipe earlier and had blood in her vaginal vault that was bright red there is only a scant amount but it did startle her.  Patient feels like she is trying to run a fever but has not.  (Bailey Seaver, APRN, FNP-C)      Patient Care Team  Primary Care Provider: Mike Keen MD    Past Medical History:     Allergies   Allergen Reactions    Molds & Smuts Dermatitis    Morphine And Codeine Other (See Comments)     MAKES HER FEEL LIKE MUSCLE TIGHTEN UP AND THEN SHE PASSES OUT    Statins Other (See Comments)     Pain in hands and feet    Sulfamethoxazole-Trimethoprim Other (See Comments)     Crews-Buzz reaction      Pravastatin Myalgia           Acetaminophen Nausea Only    Chicken Meat (Diagnostic) GI Intolerance           Elavil [Amitriptyline] Anxiety    Ibuprofen GI Intolerance     Other reaction(s): severe GI upset     Past Medical History:   Diagnosis Date    Asthma     MILD    Chronic EBV infection     Combined immunodeficiency disorder     RECEIVES IVIG EVERY 4 WEEKS    Cutaneous lupus erythematosus     Depression     MILD    Fibromyalgia     GERD (gastroesophageal reflux disease)     Hematoma     LEFT THIGH. FROM FALL. WILL LET DR SANDOVAL KNOW. HEALING    History of shingles     Hypercholesteremia     Hypertension     Narcolepsy     PONV (postoperative nausea and vomiting)     Brenda Sanchez auricular syndrome     Rib pain     RESOLVING FROM FALL. HAD XRAY. NORMAL POST FALL.  MD AWARE OF    Sjogren's disease     Sleep apnea     CPAP    Uterovaginal prolapse      Past Surgical History:   Procedure Laterality Date    ANKLE SURGERY Right     x3 2007,2008,2013    BLADDER REPAIR      Bladder Mesh    CERVICAL CORPECTOMY      2011 C5-6, C6-7 12/18/2014, Anterior Cervical Corpectomy C6 Arthrodesis at C5-C6 and C6-C7 Dr. Joya    COLONOSCOPY      HAND SURGERY      FUSION OF RIGHT THUMB. CUBITAL TUNNEL RELEASE RIGHT    HYSTERECTOMY      LAPAROSCOPIC TUBAL LIGATION  2005    SINUS SURGERY  2005    TONSILLECTOMY  1980    TOTAL LAPAROSCOPIC HYSTERECTOMY AND COLPOPEXY N/A 06/07/2023    Procedure: ROBOTIC LAPAROSCOPIC HYSTERECTOMY, SALPINGECTOMY, SACROCOLPOPEXY, LYSIS OF ADHENSIONS, CYSTOSCOPY;  Surgeon: Jono Roberts MD;  Location: [facility] MAIN OR;  Service: Robotics - DaVinci;  Laterality: N/A;     Family History   Problem Relation Age of Onset    Hypertension Mother     Hyperlipidemia Mother     Heart disease Mother     Heart disease Father     Hypertension Father     Heart disease Brother     Malig Hyperthermia Neg Hx        Home Medications:  Prior to Admission medications    Medication Sig Start Date End Date Taking? Authorizing Provider   acyclovir (ZOVIRAX) 200 MG capsule Take 1 capsule by mouth 2 (Two) Times a Day. 2/9/23   Angela Blanco MD   ammonium lactate (LAC-HYDRIN) 12 % lotion Apply 1 application topically to the appropriate area as directed As Needed for Dry Skin.    Emergency, Nurse Edmundo, RN   amphetamine-dextroamphetamine (Adderall) 10 MG tablet Take 1 tablet by mouth Daily With Lunch. 2/14/25   Jens Nelson MD   amphetamine-dextroamphetamine XR (ADDERALL XR) 20 MG 24 hr capsule Take 1 capsule by mouth Every Morning 2/14/25   Jens Nelson MD   Biotin 50233 MCG tablet Take 1 tablet by mouth Daily. 4/19/22   Angela Blanco MD   cyanocobalamin 1000 MCG/ML injection  1/30/24   Angela Blanco MD   Deep Sea Nasal Spray 0.65 % nasal spray  1/22/24    Angela Blanco MD   diclofenac (VOLTAREN) 75 MG EC tablet Take 1 tablet by mouth 2 (Two) Times a Day for 90 days. 1/2/25 4/2/25  Robel Chacon PA   EVENING PRIMROSE OIL PO Take 1 tablet by mouth Every Evening. HOLD FOR SURGERY.    Emergency, Nurse Edmundo, RN   fexofenadine (ALLEGRA) 180 MG tablet  4/10/24   Angela Blanco MD   fluticasone (FLONASE) 50 MCG/ACT nasal spray Administer 2 sprays into the nostril(s) as directed by provider Daily.  Patient not taking: Reported on 2/17/2025 1/13/23   Angela Blanco MD   Ibuprofen 3 %, Gabapentin 10 %, Baclofen 2 %, lidocaine 4 %, Ketamine HCl 4 % Apply 1-2 g topically to the appropriate area as directed 3 (Three) to 4 (Four) times daily. 1/3/25 1/3/26  Nimesh Beauchamp MD   immune globulin, human, (Gammagard) 1 GM/10ML solution infusion 30 grams intravenously every 4 weeks for 28 day(s) 11/20/23   Angela Blanco MD   ipratropium (ATROVENT) 0.06 % nasal spray 2 sprays into the nostril(s) as directed by provider 3 (Three) Times a Day. 5/15/24   Pranav Fernandes MD   Lactobacillus Acid-Pectin (Acidophilus/Citrus Pectin) tablet tablet Take 1 tablet by mouth Daily. 3/23/22   Angela Blanco MD   loratadine (CLARITIN) 10 MG tablet Take 1 tablet by mouth Daily. 4/28/22 2/17/25  Angela Blanco MD   losartan-hydrochlorothiazide (HYZAAR) 100-12.5 MG per tablet Take 1 tablet by mouth Daily. 2/22/22   Angela Blanco MD   montelukast (SINGULAIR) 10 MG tablet Take 1 tablet by mouth Daily. 1/6/23   Angela Blanco MD   Narcan 4 MG/0.1ML nasal spray Administer 1 spray into the nostril(s) as directed by provider As Needed (FOR OD). 5/4/21   Angela Blanco MD   olopatadine (PATANOL) 0.1 % ophthalmic solution  3/14/24   Angela Blanco MD   traMADol (ULTRAM) 50 MG tablet Take 1 tablet by mouth 2 (Two) Times a Day As Needed for Moderate Pain . 12/15/21   Michael Joya MD   Turmeric Curcumin 500 MG capsule Take  "500 mg by mouth 2 (Two) Times a Day. WILL HOLD FOR SURGERY    Angela Blanco MD   vitamin D (ERGOCALCIFEROL) 1.25 MG (11518 UT) capsule capsule Take 1 capsule by mouth Every 7 (Seven) Days. SUNDAYS 3/10/22   Angela Blanco MD   Zinc 50 MG tablet Take 1 tablet by mouth Daily.    Angela Blanco MD        Social History:   Social History     Tobacco Use    Smoking status: Former     Current packs/day: 0.50     Average packs/day: 0.5 packs/day for 15.0 years (7.5 ttl pk-yrs)     Types: Cigarettes    Smokeless tobacco: Never   Vaping Use    Vaping status: Never Used   Substance Use Topics    Alcohol use: No    Drug use: Never         Review of Systems:  Review of Systems   Constitutional:  Positive for chills.   Musculoskeletal:  Positive for back pain.   Neurological:  Positive for headaches.        Physical Exam:  BP (!) 155/106 (Patient Position: Sitting)   Pulse 53   Temp 98.1 °F (36.7 °C) (Oral)   Resp 16   Ht 162.6 cm (64\")   Wt 82.6 kg (182 lb 1.6 oz)   SpO2 100%   BMI 31.26 kg/m²     Physical Exam  Exam conducted with a chaperone present.   HENT:      Head: Normocephalic.      Mouth/Throat:      Mouth: Mucous membranes are moist.   Eyes:      Pupils: Pupils are equal, round, and reactive to light.   Pulmonary:      Effort: Pulmonary effort is normal.   Abdominal:      General: There is no distension.   Genitourinary:     General: Normal vulva.      Exam position: Lithotomy position.      Labia:         Left: Tenderness present.       Vagina: Vaginal discharge, erythema and prolapsed vaginal walls present.      Comments: White, thick discharge present in vaginal vault.   Musculoskeletal:      Cervical back: Neck supple.   Skin:     General: Skin is warm and dry.   Neurological:      General: No focal deficit present.      Mental Status: She is alert and oriented to person, place, and time.   Psychiatric:         Mood and Affect: Mood normal.         Behavior: Behavior normal.            "         Medical Decision Making:      Comorbidities that affect care:    Asthma, Hypertension    External Notes reviewed:    None      The following orders were placed and all results were independently analyzed by me:  Orders Placed This Encounter   Procedures    COVID-19, FLU A/B, RSV PCR 1 HR TAT - Swab, Nasopharynx    Gardnerella vaginalis, Trichomonas vaginalis, Candida albicans, DNA - Swab, Vagina    Ida Draw    Comprehensive Metabolic Panel    Lipase    Urinalysis With Microscopic If Indicated (No Culture) - Urine, Clean Catch    Lactic Acid, Plasma    CBC Auto Differential    Urinalysis, Microscopic Only - Urine, Clean Catch    NPO Diet NPO Type: Strict NPO    Undress & Gown    Supplies To Bedside - Notify MD When Ready- Pelvic Cart / Set Up    Insert Peripheral IV    CBC & Differential    Green Top (Gel)    Lavender Top    Gold Top - SST    Light Blue Top       Medications Given in the Emergency Department:  Medications   sodium chloride 0.9 % flush 10 mL (has no administration in time range)   nitrofurantoin (macrocrystal-monohydrate) (MACROBID) capsule 100 mg (100 mg Oral Given 3/11/25 2337)   phenazopyridine (PYRIDIUM) tablet 200 mg (200 mg Oral Given 3/11/25 2337)        ED Course:    ED Course as of 03/12/25 0101   Tue Mar 11, 2025   2046 PROVIDER IN TRIAGE  Patient was evaluated by me in triage, Bailey Seaver, APRN, FNP-C.  Orders were placed and patient is currently awaiting final results and disposition.   [AS]   Wed Mar 12, 2025   0038 Gardnerella vaginalis, Trichomonas vaginalis, Candida albicans, DNA - Swab, Vagina [AA]      ED Course User Index  [AA] Tiff Meyer APRN  [AS] Seaver, Alyce B, APRN       Labs:    Lab Results (last 24 hours)       Procedure Component Value Units Date/Time    COVID-19, FLU A/B, RSV PCR 1 HR TAT - Swab, Nasopharynx [[id number]]  (Normal) Collected: 03/11/25 2047    Specimen: Swab from Nasopharynx Updated: 03/11/25 2143     COVID19 Not Detected     Influenza A PCR  Not Detected     Influenza B PCR Not Detected     RSV, PCR Not Detected    Narrative:      Fact sheet for providers: https://www.fda.gov/media/469403/download    Fact sheet for patients: https://www.fda.gov/media/978016/download    Test performed by PCR.    Urinalysis With Microscopic If Indicated (No Culture) - Urine, Clean Catch [[id number]]  (Abnormal) Collected: 03/11/25 2059    Specimen: Urine, Clean Catch Updated: 03/11/25 2146     Color, UA Yellow     Appearance, UA Clear     pH, UA 5.5     Specific Gravity, UA 1.012     Glucose, UA Negative     Ketones, UA Negative     Bilirubin, UA Negative     Blood, UA Moderate (2+)     Protein, UA Negative     Leuk Esterase, UA Moderate (2+)     Nitrite, UA Negative     Urobilinogen, UA 1.0 E.U./dL    Urinalysis, Microscopic Only - Urine, Clean Catch [[id number]]  (Abnormal) Collected: 03/11/25 2059    Specimen: Urine, Clean Catch Updated: 03/11/25 2146     RBC, UA 11-20 /HPF      WBC, UA 11-20 /HPF      Bacteria, UA None Seen /HPF      Squamous Epithelial Cells, UA 0-2 /HPF      Hyaline Casts, UA 0-2 /LPF      Methodology Automated Microscopy    CBC & Differential [[id number]]  (Abnormal) Collected: 03/11/25 2120    Specimen: Blood from Arm, Right Updated: 03/11/25 2126    Narrative:      The following orders were created for panel order CBC & Differential.  Procedure                               Abnormality         Status                     ---------                               -----------         ------                     CBC Auto Differential[[id number]]        Abnormal            Final result                 Please view results for these tests on the individual orders.    Comprehensive Metabolic Panel [[id number]]  (Abnormal) Collected: 03/11/25 2120    Specimen: Blood from Arm, Right Updated: 03/11/25 2150     Glucose 106 mg/dL      BUN 18 mg/dL      Creatinine 0.94 mg/dL      Sodium 139 mmol/L      Potassium 3.9 mmol/L      Chloride 99 mmol/L      CO2 29.8 mmol/L       Calcium 9.8 mg/dL      Total Protein 7.9 g/dL      Albumin 4.2 g/dL      ALT (SGPT) 12 U/L      AST (SGOT) 17 U/L      Alkaline Phosphatase 124 U/L      Total Bilirubin 0.3 mg/dL      Globulin 3.7 gm/dL      A/G Ratio 1.1 g/dL      BUN/Creatinine Ratio 19.1     Anion Gap 10.2 mmol/L      eGFR 68.7 mL/min/1.73     Narrative:      GFR Categories in Chronic Kidney Disease (CKD)      GFR Category          GFR (mL/min/1.73)    Interpretation  G1                     90 or greater         Normal or high (1)  G2                      60-89                Mild decrease (1)  G3a                   45-59                Mild to moderate decrease  G3b                   30-44                Moderate to severe decrease  G4                    15-29                Severe decrease  G5                    14 or less           Kidney failure          (1)In the absence of evidence of kidney disease, neither GFR category G1 or G2 fulfill the criteria for CKD.    eGFR calculation 2021 CKD-EPI creatinine equation, which does not include race as a factor    Lipase [[id number]]  (Normal) Collected: 03/11/25 2120    Specimen: Blood from Arm, Right Updated: 03/11/25 2150     Lipase 31 U/L     Lactic Acid, Plasma [[id number]]  (Normal) Collected: 03/11/25 2120    Specimen: Blood from Arm, Right Updated: 03/11/25 2148     Lactate 1.0 mmol/L     CBC Auto Differential [[id number]]  (Abnormal) Collected: 03/11/25 2120    Specimen: Blood from Arm, Right Updated: 03/11/25 2126     WBC 8.02 10*3/mm3      RBC 4.78 10*6/mm3      Hemoglobin 14.4 g/dL      Hematocrit 43.2 %      MCV 90.4 fL      MCH 30.1 pg      MCHC 33.3 g/dL      RDW 13.2 %      RDW-SD 43.4 fl      MPV 11.3 fL      Platelets 274 10*3/mm3      Neutrophil % 52.0 %      Lymphocyte % 41.4 %      Monocyte % 5.7 %      Eosinophil % 0.5 %      Basophil % 0.2 %      Immature Grans % 0.2 %      Neutrophils, Absolute 4.16 10*3/mm3      Lymphocytes, Absolute 3.32 10*3/mm3      Monocytes, Absolute  0.46 10*3/mm3      Eosinophils, Absolute 0.04 10*3/mm3      Basophils, Absolute 0.02 10*3/mm3      Immature Grans, Absolute 0.02 10*3/mm3      nRBC 0.0 /100 WBC     Gardnerella vaginalis, Trichomonas vaginalis, Candida albicans, DNA - Swab, Vagina [[id number]]  (Normal) Collected: 03/11/25 5192    Specimen: Swab from Vagina Updated: 03/12/25 0100     GARDNERELLA VAGINALIS Negative     TRICHOMONAS VAGINALIS Negative     CANDIDA SPECIES Negative             Imaging:    No Radiology Exams Resulted Within Past 24 Hours      Differential Diagnosis and Discussion:    Back Pain: The patient presents with back pain. My differential diagnosis includes but is not limited to acute spinal epidural abscess, acute spinal epidural bleed, cauda equina syndrome, abdominal aortic aneurysm, aortic dissection, kidney stone, pyelonephritis, musculoskeletal back pain, spinal fracture, and osteoarthritis.     PROCEDURES:    Labs were collected in the emergency department and all labs were reviewed and interpreted by me.  X-ray were performed in the emergency department and all X-ray impressions were independently interpreted by me.    No orders to display       Procedures    MDM     Amount and/or Complexity of Data Reviewed  Clinical lab tests: reviewed                       Patient Care Considerations:          Consultants/Shared Management Plan:    None    Social Determinants of Health:    Patient is independent, reliable, and has access to care.       Disposition and Care Coordination:    Discharged: The patient is suitable and stable for discharge with no need for consideration of admission.    I have explained discharge medications and the need for follow up with the patient/caretakers. This was also printed in the discharge instructions. Patient was discharged with the following medications and follow up:      Medication List        New Prescriptions      fluconazole 150 MG tablet  Commonly known as: DIFLUCAN  Take 1 tablet by mouth  1 (One) Time for 1 dose.     nitrofurantoin (macrocrystal-monohydrate) 100 MG capsule  Commonly known as: MACROBID  Take 1 capsule by mouth 2 (Two) Times a Day for 5 days.     phenazopyridine 100 MG tablet  Commonly known as: PYRIDIUM  Take 1 tablet by mouth 3 (Three) Times a Day As Needed for Bladder Spasms for up to 2 days.               Where to Get Your Medications        These medications were sent to [facility] - [place], [place] - [address] [place] [address] [phone]  - [phone] [address] [address]      Phone: [phone]   fluconazole 150 MG tablet  nitrofurantoin (macrocrystal-monohydrate) 100 MG capsule  phenazopyridine 100 MG tablet      Mike Keen MD  [address] [address]  [phone]    Schedule an appointment as soon as possible for a visit in 2 days  As needed, If symptoms worsen       Final diagnoses:   UTI (urinary tract infection), bacterial        ED Disposition       ED Disposition   Discharge    Condition   Stable    Comment   --               This medical record created using voice recognition software.             Tiff Meyer, APRN  03/12/25 0101

## 2025-03-25 ENCOUNTER — RESULTS FOLLOW-UP (OUTPATIENT)
Dept: CARDIOLOGY | Facility: CLINIC | Age: 60
End: 2025-03-25
Payer: MEDICARE

## 2025-03-26 NOTE — TELEPHONE ENCOUNTER
Per Dr. Castano:  Holter shows normal sinus rhythm with no sign of arrhythmia. Occasional premature beats nothing to be concerned about rate is well controlled. Notify office if symptoms persist/worsen.    Spoke with patient, patient is aware and verbalized understanding.

## 2025-03-27 ENCOUNTER — HOSPITAL ENCOUNTER (OUTPATIENT)
Dept: INFUSION THERAPY | Facility: HOSPITAL | Age: 60
Discharge: HOME | End: 2025-03-27
Payer: MEDICARE

## 2025-03-27 VITALS
SYSTOLIC BLOOD PRESSURE: 118 MMHG | DIASTOLIC BLOOD PRESSURE: 68 MMHG | OXYGEN SATURATION: 97 % | WEIGHT: 182.76 LBS | HEIGHT: 64 IN | BODY MASS INDEX: 31.2 KG/M2 | RESPIRATION RATE: 18 BRPM | HEART RATE: 82 BPM | TEMPERATURE: 97.4 F

## 2025-03-27 DIAGNOSIS — D83.9 COMMON VARIABLE IMMUNODEFICIENCY: Primary | ICD-10-CM

## 2025-03-27 PROCEDURE — 25010000002 IMMUNE GLOBULIN (HUMAN) 10 GM/100ML SOLUTION: Performed by: ALLERGY & IMMUNOLOGY

## 2025-03-27 PROCEDURE — 96365 THER/PROPH/DIAG IV INF INIT: CPT

## 2025-03-27 PROCEDURE — 25010000002 IMMUNE GLOBULIN (HUMAN) 5 GM/50ML SOLUTION: Performed by: ALLERGY & IMMUNOLOGY

## 2025-03-27 PROCEDURE — 25010000002 IMMUNE GLOBULIN (HUMAN) 20 GM/200ML SOLUTION: Performed by: ALLERGY & IMMUNOLOGY

## 2025-03-27 PROCEDURE — 96366 THER/PROPH/DIAG IV INF ADDON: CPT

## 2025-03-27 RX ORDER — DIPHENHYDRAMINE HCL 25 MG
50 CAPSULE ORAL ONCE AS NEEDED
Start: 2025-04-24

## 2025-03-27 RX ADMIN — IMMUNE GLOBULIN (HUMAN) 5 G: 10 INJECTION INTRAVENOUS; SUBCUTANEOUS at 13:24

## 2025-03-27 RX ADMIN — IMMUNE GLOBULIN (HUMAN) 10 G: 10 INJECTION INTRAVENOUS; SUBCUTANEOUS at 13:55

## 2025-03-27 RX ADMIN — IMMUNE GLOBULIN (HUMAN) 20 G: 10 INJECTION INTRAVENOUS; SUBCUTANEOUS at 15:06

## 2025-04-22 ENCOUNTER — OFFICE VISIT (OUTPATIENT)
Dept: SLEEP MEDICINE | Facility: HOSPITAL | Age: 60
End: 2025-04-22
Payer: MEDICARE

## 2025-04-22 VITALS
HEART RATE: 65 BPM | HEIGHT: 64 IN | DIASTOLIC BLOOD PRESSURE: 77 MMHG | SYSTOLIC BLOOD PRESSURE: 129 MMHG | BODY MASS INDEX: 31.31 KG/M2 | OXYGEN SATURATION: 98 % | WEIGHT: 183.4 LBS

## 2025-04-22 DIAGNOSIS — G47.00 INSOMNIA, UNSPECIFIED TYPE: ICD-10-CM

## 2025-04-22 DIAGNOSIS — G47.419 NARCOLEPSY WITHOUT CATAPLEXY: ICD-10-CM

## 2025-04-22 DIAGNOSIS — G47.33 OSA (OBSTRUCTIVE SLEEP APNEA): Primary | ICD-10-CM

## 2025-04-22 DIAGNOSIS — G47.50 PARASOMNIA, UNSPECIFIED TYPE: ICD-10-CM

## 2025-04-22 PROCEDURE — G0463 HOSPITAL OUTPT CLINIC VISIT: HCPCS | Performed by: STUDENT IN AN ORGANIZED HEALTH CARE EDUCATION/TRAINING PROGRAM

## 2025-04-22 RX ORDER — PILOCARPINE HYDROCHLORIDE 5 MG/1
TABLET, FILM COATED ORAL
COMMUNITY
Start: 2025-03-12

## 2025-04-22 NOTE — PROGRESS NOTES
[facility]   [address]  [place] [address]  [phone]       Sleep Clinic Follow-up Note        PCP: Mike Keen MD  Date of visit: 4/22/2025    Patient or patient representative verbalized consent for the use of Ambient Listening during the visit with  Jhonny Gerber DO for chart documentation. 4/22/2025  12:36 EDT    HISTORY OF PRESENT ILLNESS  Maia Hui is a 60 y.o. female following up today, on 4/22/2025 at [facility] for narcolepsy, obstructive sleep apnea  History of Present Illness  The patient presents for evaluation of narcolepsy and obstructive sleep apnea.  Sleep study from 2011 showed AHI 6.1 RDI 66.2 based off that study she was not a candidate for oral mandibular device or for inspire therapy.   She has a history of narcolepsy and obstructive sleep apnea, for which she is currently on Adderall 20 mg extended-release in the morning and 10 mg immediate-release in the afternoon. She typically wakes up around 8:00 AM, takes her morning medication, and administers the short-release dose between 11:00 AM and 1:00 PM.   She goes to bed at 11 PM and gets up at 8 AM.  She believes she sleeps 4 to 5 hours at this time.  She wakes up 4 or more times per night related to pain or noise.  She experiences insomnia, with periods of prolonged wakefulness at night, even when feeling extremely tired. She attempts to avoid activities that may interfere with her sleep. She has a long-standing history of insomnia.  She denies cataplexy symptoms. She has had sleep paralysis in the past, with dreams where she feels frozen and sees visions approaching her or people walking around her bed. These episodes occur once or twice a year, down from more frequent occurrences before her sleep apnea treatment. She continues to have dreams where she acts out, flailing, screaming, or yelling, and waking herself up. She no longer shares a bed  with her . She has never walked in her sleep. She is very cautious about driving due to her condition. She has tried modafinil (Provigil) in the past but found it intolerable.  She denies chest pain, syncope, shortness of breath, vision changes, suicidal ideation.  She has consulted her cardiologist for palpitations, which are most noticeable at night when she is attempting to rest. These palpitations are not severe but have increased in frequency. They do not cause dizziness or disorientation. She has a history of premature ventricular contractions (PVCs) prior to starting Adderall. Given her family history of cardiac issues, she is vigilant about monitoring her condition.  She has expressed interest in exploring the use of a mouth appliance as an alternative to CPAP therapy. She has experienced sinus fungal infections twice due to immunodeficiency, with the first infection affecting her maxillary sinus and requiring surgical intervention. The second infection occurred in her upper sinus. She meticulously cleans and dries her CPAP machine daily to prevent further infections. She occasionally experiences difficulty breathing at night, despite being able to breathe through nasal congestion during the day. She uses nasal pillows with her CPAP machine but struggles with them.     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  Obstructive sleep apnea  PVCs  Common variable immunodeficiency     PAP download data March 23 through April 21, 2025  Device: AirSense 10 AutoSet  Mask type: Nasal pillow  Pressure 8 cm H2O  % days used >4 hours: 97  Average usage days used: 8 hours 24 minutes  AHI: 7.6, obstructive apneas 4.1/h  Median leak: L/min 0.1  95th % leak: L/min 6.7  DME supplier: [employer]    [place] Sleepiness Scale :Total score: 6     REVIEW OF SYSTEMS:   Positive for nasal congestion, dry mouth, morning headache    Result Review   The following data was reviewed by: Jhonny Gerber DO on 04/22/2025:  [x]   "Allergies reviewed  [x]  Medications reviewed    SOCIAL (habits pertaining to sleep medicine)  History tobacco use: None  History of alcohol use: None per week  Caffeine use: 1 drink per day    OB History    No obstetric history on file.          Objective   Vital Signs:   Vitals:    04/22/25 1100   BP: 129/77   Pulse: 65   SpO2: 98%   Weight: 83.2 kg (183 lb 6.4 oz)   Height: 162.6 cm (64.02\")     Body mass index is 31.46 kg/m².    PHYSICAL EXAMINATION:  CONSTITUTIONAL: Well appearing, in no overt pain or respiratory distress   RESP SYSTEM:  No overt respiratory distress, speaks in clear sentences without dyspnea, no accessory muscle use  CARDIOVASULAR: Regular rate     ASSESSMENT/PLAN  Diagnoses and all orders for this visit:    1. DILCIA (obstructive sleep apnea) (Primary)  -     Polysomnography 4 or More Parameters; Future    2. Narcolepsy without cataplexy  -     Polysomnography 4 or More Parameters; Future    3. Parasomnia, unspecified type  -     Polysomnography 4 or More Parameters; Future    4. Insomnia, unspecified type  -     Polysomnography 4 or More Parameters; Future  -     Ambulatory Referral to Behavioral Health      Obstructive sleep apnea: AHI 7.6 with 4.1/h as obstructive events. Increase CPAP pressure by 1cm to 9cm H2O  Her last sleep study was in 2011 and she is wondering if she is a candidate for an oral appliance due to difficulties with CPAP.  In lab sleep study ordered    I have independently reviewed the PAP compliance data and discussed with the patient the download data and encouarged the patient to continue to use the device. The device is benefiting the patient and the device is medically necessary.  The patient is also instructed to get the supplies from the [facility] and and change them on a regular basis.  A prescription for supplies has been sent to the [facility].     Narcolepsy  Continue Adderall 20 mg extended release in the morning and 10 mg immediate release in the " afternoon  PDMP reviewed. RX ordered.   Discussed ED precautions and possible cardiovascular side effects of heart attack, stroke, sudden death.     Insomnia  She has had chronic troubles sleeping and has not done CBT before.  Referral placed today.    Possible REM sleep behavior disorder: PSG ordered to check for REM without atonia. She reports never leaving her bed during sleep.       FOLLOW UP  Return in about 3 months (around 7/22/2025).  Patient was given instructions and counseling regarding her condition or for health maintenance advice. Please see specific information pulled into the AVS if appropriate.         Patient's questions were answered.  Dictated Utilizing Dragon Dictation. Please note that portions of this note were completed with a voice recognition program. Part of this note may be an electronic transcription/translation of spoken language to printed text using the Dragon Dictation System.      [facility]   Jhonny Gerber DO  04/22/25  12:25 EDT

## 2025-04-23 RX ORDER — DEXTROAMPHETAMINE SACCHARATE, AMPHETAMINE ASPARTATE, DEXTROAMPHETAMINE SULFATE AND AMPHETAMINE SULFATE 2.5; 2.5; 2.5; 2.5 MG/1; MG/1; MG/1; MG/1
10 TABLET ORAL
Qty: 30 TABLET | Refills: 0 | Status: SHIPPED | OUTPATIENT
Start: 2025-04-23

## 2025-04-23 RX ORDER — DEXTROAMPHETAMINE SACCHARATE, AMPHETAMINE ASPARTATE MONOHYDRATE, DEXTROAMPHETAMINE SULFATE AND AMPHETAMINE SULFATE 5; 5; 5; 5 MG/1; MG/1; MG/1; MG/1
20 CAPSULE, EXTENDED RELEASE ORAL EVERY MORNING
Qty: 30 CAPSULE | Refills: 0 | Status: SHIPPED | OUTPATIENT
Start: 2025-04-23

## 2025-04-24 ENCOUNTER — HOSPITAL ENCOUNTER (OUTPATIENT)
Dept: INFUSION THERAPY | Facility: HOSPITAL | Age: 60
Discharge: HOME | End: 2025-04-24
Payer: MEDICARE

## 2025-04-24 VITALS
DIASTOLIC BLOOD PRESSURE: 82 MMHG | RESPIRATION RATE: 18 BRPM | HEIGHT: 64 IN | TEMPERATURE: 97.7 F | SYSTOLIC BLOOD PRESSURE: 119 MMHG | OXYGEN SATURATION: 98 % | WEIGHT: 183.2 LBS | HEART RATE: 56 BPM | BODY MASS INDEX: 31.28 KG/M2

## 2025-04-24 DIAGNOSIS — D83.9 COMMON VARIABLE IMMUNODEFICIENCY: Primary | ICD-10-CM

## 2025-04-24 PROCEDURE — 96366 THER/PROPH/DIAG IV INF ADDON: CPT

## 2025-04-24 PROCEDURE — 25010000002 IMMUNE GLOBULIN (HUMAN) 10 GM/100ML SOLUTION: Performed by: ALLERGY & IMMUNOLOGY

## 2025-04-24 PROCEDURE — 25010000002 IMMUNE GLOBULIN (HUMAN) 5 GM/50ML SOLUTION: Performed by: ALLERGY & IMMUNOLOGY

## 2025-04-24 PROCEDURE — 25010000002 IMMUNE GLOBULIN (HUMAN) 20 GM/200ML SOLUTION: Performed by: ALLERGY & IMMUNOLOGY

## 2025-04-24 PROCEDURE — 96365 THER/PROPH/DIAG IV INF INIT: CPT

## 2025-04-24 RX ORDER — DIPHENHYDRAMINE HCL 25 MG
50 CAPSULE ORAL ONCE AS NEEDED
Start: 2025-05-22

## 2025-04-24 RX ADMIN — IMMUNE GLOBULIN (HUMAN) 20 G: 10 INJECTION INTRAVENOUS; SUBCUTANEOUS at 13:53

## 2025-04-24 RX ADMIN — IMMUNE GLOBULIN (HUMAN) 5 G: 10 INJECTION INTRAVENOUS; SUBCUTANEOUS at 16:18

## 2025-04-24 RX ADMIN — IMMUNE GLOBULIN (HUMAN) 10 G: 10 INJECTION INTRAVENOUS; SUBCUTANEOUS at 16:03

## 2025-05-15 ENCOUNTER — OFFICE VISIT (OUTPATIENT)
Dept: OTOLARYNGOLOGY | Facility: CLINIC | Age: 60
End: 2025-05-15
Payer: MEDICARE

## 2025-05-15 VITALS
WEIGHT: 181 LBS | DIASTOLIC BLOOD PRESSURE: 81 MMHG | BODY MASS INDEX: 30.9 KG/M2 | SYSTOLIC BLOOD PRESSURE: 120 MMHG | TEMPERATURE: 97.5 F | HEART RATE: 65 BPM | HEIGHT: 64 IN

## 2025-05-15 DIAGNOSIS — R51.9 HEADACHE DISORDER: ICD-10-CM

## 2025-05-15 DIAGNOSIS — J32.9 FUNGAL SINUSITIS: ICD-10-CM

## 2025-05-15 DIAGNOSIS — B49 FUNGAL SINUSITIS: ICD-10-CM

## 2025-05-15 DIAGNOSIS — H61.21 HEARING LOSS OF RIGHT EAR DUE TO CERUMEN IMPACTION: ICD-10-CM

## 2025-05-15 DIAGNOSIS — J30.0 VASOMOTOR RHINITIS: ICD-10-CM

## 2025-05-15 DIAGNOSIS — J33.8 NASAL SINUS POLYP: Primary | ICD-10-CM

## 2025-05-15 RX ORDER — IPRATROPIUM BROMIDE 42 UG/1
2 SPRAY, METERED NASAL 3 TIMES DAILY
Qty: 15 ML | Refills: 10 | Status: SHIPPED | OUTPATIENT
Start: 2025-05-15

## 2025-05-15 NOTE — PROGRESS NOTES
Patient Name: Maia Hui   Visit Date: 05/15/2025   Patient ID: [id number]  Provider: Pranav Fernandes MD    Sex: female  Location: [facility]   YOB: 1965  Location Address: [address] [place] [address], [address] [place] [address],?[place]?[id number]    Primary Care Provider Mike Keen MD  Location Phone: [phone]    Referring Provider: No ref. provider found        Chief Complaint  1 year follow up with rigid endoscopy, nasal sinus polyp, fungal sinusitis, vasomotor rhinitis, and hearing loss right ear    History of Present Illness  Maia Hui is a 60 y.o. female who presents to [facility] for 1 year follow up with rigid endoscopy, nasal sinus polyp, fungal sinusitis, vasomotor rhinitis, and hearing loss right ear.   Patient has history of chronic sinusitis, obstructive sleep apnea, sinus polyposis and underwent functional endoscopic sinus surgery revision on 12/18/2020 fungus. Patient also has history of right facial weakness secondary to shingles. Patient is on IVIG for immune deficiency as well as immunotherapy for allergy. Patient did have a CT of sinuses obtained on 2/22/2023 which was negative except for surgical changes. Patient was last seen at [facility] ENT clinic by Dr. Fernandes on 9/25/2023. Rigid nasal endoscopy at that time showed all sinuses, ethmoid, frontal, and maxillary were clear. At that visit patient was also complaining about what sounded like vasomotor rhinitis and was started on Atrovent nasal spray 0.06% .     Patient is doing very well with no evidence of any recurrent sinus polyps or fungus on rigid nasal endoscopy.  Patient also had some symptoms of flutter but most likely due to tensor tympani muscle spasm that had resolved.  Otherwise patient has cerumen and epithelial debris impaction in the right ear that was removed uneventfully.  Patient apparently got nasal spray changed by allergy physician to Flonase.  However she may use  Flonase but I think she may still have vasomotor rhinitis and she did have some excellent results with it.  Therefore I am going to reorder Atrovent nasal spray.  I will see patient in 1 year with rigid nasal endoscopy.     Past Medical History:   Diagnosis Date    Asthma     MILD    Chronic EBV infection     Combined immunodeficiency disorder     RECEIVES IVIG EVERY 4 WEEKS    Cutaneous lupus erythematosus     Depression     MILD    Fibromyalgia     GERD (gastroesophageal reflux disease)     Hematoma     LEFT THIGH. FROM FALL. WILL LET DR SANDOVAL KNOW. HEALING    History of shingles     Hypercholesteremia     Hypertension     Narcolepsy     PONV (postoperative nausea and vomiting)     Anton Chico Sanchez auricular syndrome     Rib pain     RESOLVING FROM FALL. HAD XRAY. NORMAL POST FALL. MD AWARE OF    Sjogren's disease     Sleep apnea     CPAP    Uterovaginal prolapse        Past Surgical History:   Procedure Laterality Date    ANKLE SURGERY Right     x3 2007,2008,2013    BLADDER REPAIR      Bladder Mesh    CERVICAL CORPECTOMY      2011 C5-6, C6-7 12/18/2014, Anterior Cervical Corpectomy C6 Arthrodesis at C5-C6 and C6-C7 Dr. Joya    COLONOSCOPY      HAND SURGERY      FUSION OF RIGHT THUMB. CUBITAL TUNNEL RELEASE RIGHT    HYSTERECTOMY      LAPAROSCOPIC TUBAL LIGATION  2005    SINUS SURGERY  2005    TONSILLECTOMY  1980    TOTAL LAPAROSCOPIC HYSTERECTOMY AND COLPOPEXY N/A 06/07/2023    Procedure: ROBOTIC LAPAROSCOPIC HYSTERECTOMY, SALPINGECTOMY, SACROCOLPOPEXY, LYSIS OF ADHENSIONS, CYSTOSCOPY;  Surgeon: Jono Sandoval MD;  Location: [facility];  Service: [facility];  Laterality: N/A;         Current Outpatient Medications:     acyclovir (ZOVIRAX) 200 MG capsule, Take 1 capsule by mouth 2 (Two) Times a Day., Disp: , Rfl:     ammonium lactate (LAC-HYDRIN) 12 % lotion, Apply 1 application topically to the appropriate area as directed As Needed for Dry Skin., Disp: , Rfl:     amphetamine-dextroamphetamine  (Adderall) 10 MG tablet, Take 1 tablet by mouth Daily With Lunch., Disp: 30 tablet, Rfl: 0    amphetamine-dextroamphetamine XR (ADDERALL XR) 20 MG 24 hr capsule, Take 1 capsule by mouth Every Morning, Disp: 30 capsule, Rfl: 0    Biotin 47806 MCG tablet, Take 1 tablet by mouth Daily., Disp: , Rfl:     cyanocobalamin 1000 MCG/ML injection, , Disp: , Rfl:     Deep Sea Nasal Spray 0.65 % nasal spray, , Disp: , Rfl:     EVENING PRIMROSE OIL PO, Take 1 tablet by mouth Every Evening. HOLD FOR SURGERY., Disp: , Rfl:     fexofenadine (ALLEGRA) 180 MG tablet, , Disp: , Rfl:     Ibuprofen 3 %, Gabapentin 10 %, Baclofen 2 %, lidocaine 4 %, Ketamine HCl 4 %, Apply 1-2 g topically to the appropriate area as directed 3 (Three) to 4 (Four) times daily., Disp: 90 g, Rfl: 3    immune globulin, human, (Gammagard) 1 GM/10ML solution infusion, 30 grams intravenously every 4 weeks for 28 day(s), Disp: , Rfl:     ipratropium (ATROVENT) 0.06 % nasal spray, Administer 2 sprays into the nostril(s) as directed by provider 3 (Three) Times a Day., Disp: 15 mL, Rfl: 10    Lactobacillus Acid-Pectin (Acidophilus/Citrus Pectin) tablet tablet, Take 1 tablet by mouth Daily., Disp: , Rfl:     losartan-hydrochlorothiazide (HYZAAR) 100-12.5 MG per tablet, Take 1 tablet by mouth Daily., Disp: , Rfl:     montelukast (SINGULAIR) 10 MG tablet, Take 1 tablet by mouth Daily., Disp: , Rfl:     Narcan 4 MG/0.1ML nasal spray, Administer 1 spray into the nostril(s) as directed by provider As Needed (FOR OD)., Disp: , Rfl:     olopatadine (PATANOL) 0.1 % ophthalmic solution, , Disp: , Rfl:     pilocarpine (SALAGEN) 5 MG tablet, , Disp: , Rfl:     traMADol (ULTRAM) 50 MG tablet, Take 1 tablet by mouth 2 (Two) Times a Day As Needed for Moderate Pain ., Disp: 30 tablet, Rfl: 0    Turmeric Curcumin 500 MG capsule, Take 500 mg by mouth 2 (Two) Times a Day. WILL HOLD FOR SURGERY, Disp: , Rfl:     vitamin D (ERGOCALCIFEROL) 1.25 MG (74874 UT) capsule capsule, Take 1  "capsule by mouth Every 7 (Seven) Days. SUNDAYS, Disp: , Rfl:     Zinc 50 MG tablet, Take 1 tablet by mouth Daily., Disp: , Rfl:     loratadine (CLARITIN) 10 MG tablet, Take 1 tablet by mouth Daily., Disp: 30 tablet, Rfl: 20     Allergies   Allergen Reactions    Molds & Smuts Dermatitis    Morphine And Codeine Other (See Comments)     MAKES HER FEEL LIKE MUSCLE TIGHTEN UP AND THEN SHE PASSES OUT    Statins Other (See Comments)     Pain in hands and feet    Sulfamethoxazole-Trimethoprim Other (See Comments)     Crews-Buzz reaction      Pravastatin Myalgia           Nystatin Other (See Comments)     nystatin    Sulfa Antibiotics Unknown (See Comments)     Substance with sulfonamide structure and antibacterial mechanism of action (substance)    Vancomycin Unknown (See Comments)    Acetaminophen Nausea Only    Chicken Meat (Diagnostic) GI Intolerance           Elavil [Amitriptyline] Anxiety    Ibuprofen GI Intolerance     Other reaction(s): severe GI upset       Social History     Tobacco Use    Smoking status: Former     Current packs/day: 0.50     Average packs/day: 0.5 packs/day for 15.0 years (7.5 ttl pk-yrs)     Types: Cigarettes    Smokeless tobacco: Never   Vaping Use    Vaping status: Never Used   Substance Use Topics    Alcohol use: No    Drug use: Never        Objective     Vital Signs:   Vitals:    05/15/25 1032   BP: 120/81   Pulse: 65   Temp: 97.5 °F (36.4 °C)   Weight: 82.1 kg (181 lb)   Height: 162.6 cm (64\")       Tobacco Use: Medium Risk (5/15/2025)    Patient History     Smoking Tobacco Use: Former     Smokeless Tobacco Use: Never     Passive Exposure: Not on file         Physical Exam    Constitutional   Appearance  well developed, well-nourished, alert and in no acute distress, voice clear and strong    Head   Inspection  no deformities or lesions      Face   Inspection  no facial lesions; House-Brackmann I/VI bilaterally   Palpation  no TMJ crepitus nor  muscle tenderness bilaterally "     Eyes/Vision   Visual Fields  extraocular movements are intact, no spontaneous or gaze-induced nystagmus  Conjunctivae  clear   Sclerae  clear   Pupils and Irises  pupils equal, round, and reactive to light.   Nystagmus  not present     Ears, Nose, Mouth and Throat  Ears  External Ears  appearance within normal limits, no lesions present   Otoscopic Examination  tympanic membrane appearance within normal limits bilaterally without perforations, well-aerated middle ears   Hearing  intact to conversational voice both ears   Tunning fork testing    Rinne:  Zamudio:    Nose  External Nose  appearance normal   Intranasal Exam  mucosa within normal limits, vestibules normal, no intranasal lesions present, septum midline, sinuses non tender to percussion   Modified Person Test:    Oral Cavity  Oral Mucosa  oral mucosa normal without pallor or cyanosis   Lips  lip appearance normal   Teeth  normal dentition for age   Gums  gums pink, non-swollen, no bleeding present   Tongue  tongue appearance normal; normal mobility   Palate  hard palate normal, soft palate appearance normal with symmetric mobility     Throat  Oropharynx  no inflammation or lesions present, tonsils within normal limits   Hypopharynx  appearance within normal limits   Larynx  voice normal     Neck  Inspection/Palpation  normal appearance, no masses or tenderness, trachea midline; thyroid size normal, nontender, no nodules or masses present on palpation     Lymphatic  Neck  no lymphadenopathy present   Supraclavicular Nodes  no lymphadenopathy present   Preauricular Nodes  no lymphadenopathy present     Respiratory  Respiratory Effort  breathing unlabored   Inspection of Chest  normal appearance, no retractions     Musculoskeletal   Cervical back: Normal range of motion and neck supple.      Skin and Subcutaneous Tissue  General Inspection  Regarding face and neck - there are no rashes present, no lesions present, and no areas of discoloration  "    Neurologic  Cranial Nerves  cranial nerves II-XII are grossly intact bilaterally   Gait and Station  normal gait, able to stand without diffculty    Psychiatric  Judgement and Insight  judgment and insight intact   Mood and Affect  mood normal, affect appropriate       RESULTS REVIEWED    I have reviewed the following information:   []  Previous Internal Note  []  Previous External Note:   []  Ordered Tests & Results:      Pathology: No results found for: \"MICRO\"    Calcium   Date Value Ref Range Status   03/11/2025 9.8 8.6 - 10.5 mg/dL Final       No Images in the past 120 days found..    I have discussed the interpretation of the above results with the patient.    Nasal endoscopy    Date/Time: 5/15/2025 11:21 AM    Performed by: Pranav Fernandes MD  Authorized by: Pranav Fernandes MD    Consent:     Consent obtained:  Verbal    Consent given by:  Patient    Risks discussed:  Bleeding, infection and pain    Alternatives discussed:  No treatment and delayed treatment  Procedure details:     Medication:  Afrin    Instrument: rigid nasal endoscopy      Scope location: bilateral nare    Post-procedure details:     Patient tolerance of procedure:  Tolerated well  Comments:      After topical anesthetic and decongestant application, rigid nasal endoscopy was performed on both sides.  Bilateral ethmoid cavities as well as frontal recess areas have been wide open maxillary antrostomy is wide open and there is no evidence of any fungal material or polyps present.  There is no evidence of any purulent drainage either.  Nasal airway is wide open with septum in midline.  Patient otherwise tolerated procedure well.            Assessment and Plan   Diagnoses and all orders for this visit:    1. Nasal sinus polyp (Primary)  -     $ Nasal / Sinus Endoscopy    2. Fungal sinusitis  -     $ Nasal / Sinus Endoscopy    3. Headache disorder  -     $ Nasal / Sinus Endoscopy    4. Vasomotor rhinitis  -     ipratropium (ATROVENT) 0.06 % " nasal spray; Administer 2 sprays into the nostril(s) as directed by provider 3 (Three) Times a Day.  Dispense: 15 mL; Refill: 10    5. Hearing loss of right ear due to cerumen impaction    Other orders  -     Cancel: $ Nasal Endsocopy with Debridement        (J33.8) Nasal sinus polyp - Plan: $ Nasal / Sinus Endoscopy    (B49,  J32.9) Fungal sinusitis - Plan: $ Nasal / Sinus Endoscopy    (R51.9) Headache disorder - Plan: $ Nasal / Sinus Endoscopy    (J30.0) Vasomotor rhinitis - Plan: ipratropium (ATROVENT) 0.06 % nasal spray    (H61.21) Hearing loss of right ear due to cerumen impaction     Maia Hui  reports that she has quit smoking. Her smoking use included cigarettes. She has a 7.5 pack-year smoking history. She has never used smokeless tobacco.         Plan:  Patient Instructions   1.  Patient has history of shingles and that affected the right facial nerve.  However her facial nerve function is unremarkable today.  She gets episodic earache that is deep and neurologist felt it was possibly related to geniculate ganglion from previous shingles.  Anyhow patient had been taking acyclovir since her shingles and is currently on 400 mg twice a day.  I recommended that since patient is still having pain despite this go ahead and taper off of it.  2.  Despite history of fungal sinusitis and nasal sinus polyposis there is no evidence of any recurrent disease present.  3.  Patient has history of vasomotor rhinitis and I think she had been treated with Atrovent nasal spray and that is helping.  4.  Despite prior history of cerumen impaction, on today's examination there is no evidence of any cerumen impaction at all.  5.  I am going to release patient from ENT services at this time.  I think she has done very well.        Follow Up   Return if symptoms worsen or fail to improve.  Patient was given instructions and counseling regarding her condition or for health maintenance advice. Please see specific information  pulled into the AVS if appropriate.

## 2025-05-15 NOTE — PATIENT INSTRUCTIONS
1.  Patient has history of shingles and that affected the right facial nerve.  However her facial nerve function is unremarkable today.  She gets episodic earache that is deep and neurologist felt it was possibly related to geniculate ganglion from previous shingles.  Anyhow patient had been taking acyclovir since her shingles and is currently on 400 mg twice a day.  I recommended that since patient is still having pain despite this go ahead and taper off of it.  2.  Despite history of fungal sinusitis and nasal sinus polyposis there is no evidence of any recurrent disease present.  3.  Patient has history of vasomotor rhinitis and I think she had been treated with Atrovent nasal spray and that is helping.  4.  Despite prior history of cerumen impaction, on today's examination there is no evidence of any cerumen impaction at all.  5.  I am going to release patient from ENT services at this time.  I think she has done very well.

## 2025-05-22 ENCOUNTER — HOSPITAL ENCOUNTER (OUTPATIENT)
Dept: INFUSION THERAPY | Facility: HOSPITAL | Age: 60
Discharge: HOME | End: 2025-05-22
Admitting: ALLERGY & IMMUNOLOGY
Payer: MEDICARE

## 2025-05-22 VITALS
WEIGHT: 180.12 LBS | HEART RATE: 71 BPM | BODY MASS INDEX: 30.75 KG/M2 | HEIGHT: 64 IN | TEMPERATURE: 97.7 F | RESPIRATION RATE: 20 BRPM | SYSTOLIC BLOOD PRESSURE: 125 MMHG | DIASTOLIC BLOOD PRESSURE: 92 MMHG | OXYGEN SATURATION: 98 %

## 2025-05-22 DIAGNOSIS — D83.9 COMMON VARIABLE IMMUNODEFICIENCY: Primary | ICD-10-CM

## 2025-05-22 PROCEDURE — 25010000002 IMMUNE GLOBULIN (HUMAN) 10 GM/100ML SOLUTION: Performed by: ALLERGY & IMMUNOLOGY

## 2025-05-22 PROCEDURE — 25010000002 IMMUNE GLOBULIN (HUMAN) 5 GM/50ML SOLUTION: Performed by: ALLERGY & IMMUNOLOGY

## 2025-05-22 PROCEDURE — 96366 THER/PROPH/DIAG IV INF ADDON: CPT

## 2025-05-22 PROCEDURE — 25010000002 IMMUNE GLOBULIN (HUMAN) 20 GM/200ML SOLUTION: Performed by: ALLERGY & IMMUNOLOGY

## 2025-05-22 PROCEDURE — 96365 THER/PROPH/DIAG IV INF INIT: CPT

## 2025-05-22 RX ORDER — DIPHENHYDRAMINE HCL 25 MG
50 CAPSULE ORAL ONCE AS NEEDED
Start: 2025-06-12

## 2025-05-22 RX ADMIN — IMMUNE GLOBULIN (HUMAN) 5 G: 10 INJECTION INTRAVENOUS; SUBCUTANEOUS at 13:32

## 2025-05-22 RX ADMIN — IMMUNE GLOBULIN (HUMAN) 10 G: 10 INJECTION INTRAVENOUS; SUBCUTANEOUS at 14:10

## 2025-05-22 RX ADMIN — IMMUNE GLOBULIN (HUMAN) 20 G: 10 INJECTION INTRAVENOUS; SUBCUTANEOUS at 15:18

## 2025-05-25 ENCOUNTER — HOSPITAL ENCOUNTER (OUTPATIENT)
Dept: SLEEP MEDICINE | Facility: HOSPITAL | Age: 60
Discharge: HOME | End: 2025-05-25
Admitting: STUDENT IN AN ORGANIZED HEALTH CARE EDUCATION/TRAINING PROGRAM
Payer: MEDICARE

## 2025-05-25 DIAGNOSIS — G47.50 PARASOMNIA, UNSPECIFIED TYPE: ICD-10-CM

## 2025-05-25 DIAGNOSIS — G47.33 OSA (OBSTRUCTIVE SLEEP APNEA): ICD-10-CM

## 2025-05-25 DIAGNOSIS — G47.00 INSOMNIA, UNSPECIFIED TYPE: ICD-10-CM

## 2025-05-25 DIAGNOSIS — G47.419 NARCOLEPSY WITHOUT CATAPLEXY: ICD-10-CM

## 2025-05-25 PROCEDURE — 95810 POLYSOM 6/> YRS 4/> PARAM: CPT

## 2025-06-10 ENCOUNTER — TRANSCRIBE ORDERS (OUTPATIENT)
Dept: ADMINISTRATIVE | Facility: HOSPITAL | Age: 60
End: 2025-06-10
Payer: MEDICARE

## 2025-06-10 DIAGNOSIS — Z12.31 VISIT FOR SCREENING MAMMOGRAM: ICD-10-CM

## 2025-06-10 DIAGNOSIS — M85.89 OTHER SPECIFIED DISORDERS OF BONE DENSITY AND STRUCTURE, MULTIPLE SITES: Primary | ICD-10-CM

## 2025-06-13 DIAGNOSIS — G47.419 NARCOLEPSY WITHOUT CATAPLEXY: ICD-10-CM

## 2025-06-13 RX ORDER — DEXTROAMPHETAMINE SACCHARATE, AMPHETAMINE ASPARTATE MONOHYDRATE, DEXTROAMPHETAMINE SULFATE AND AMPHETAMINE SULFATE 5; 5; 5; 5 MG/1; MG/1; MG/1; MG/1
20 CAPSULE, EXTENDED RELEASE ORAL EVERY MORNING
Qty: 30 CAPSULE | Refills: 0 | Status: SHIPPED | OUTPATIENT
Start: 2025-06-13

## 2025-06-13 RX ORDER — DEXTROAMPHETAMINE SACCHARATE, AMPHETAMINE ASPARTATE, DEXTROAMPHETAMINE SULFATE AND AMPHETAMINE SULFATE 2.5; 2.5; 2.5; 2.5 MG/1; MG/1; MG/1; MG/1
10 TABLET ORAL
Qty: 30 TABLET | Refills: 0 | Status: SHIPPED | OUTPATIENT
Start: 2025-06-13

## 2025-06-13 NOTE — TELEPHONE ENCOUNTER
Patient request 90 day supply sent to [facility], [facility].

## 2025-06-19 ENCOUNTER — HOSPITAL ENCOUNTER (OUTPATIENT)
Dept: INFUSION THERAPY | Facility: HOSPITAL | Age: 60
Discharge: HOME | End: 2025-06-19
Admitting: ALLERGY & IMMUNOLOGY
Payer: MEDICARE

## 2025-06-19 VITALS
HEART RATE: 72 BPM | SYSTOLIC BLOOD PRESSURE: 141 MMHG | TEMPERATURE: 97.6 F | OXYGEN SATURATION: 98 % | DIASTOLIC BLOOD PRESSURE: 86 MMHG | BODY MASS INDEX: 30.75 KG/M2 | RESPIRATION RATE: 20 BRPM | WEIGHT: 180.12 LBS | HEIGHT: 64 IN

## 2025-06-19 DIAGNOSIS — D83.9 COMMON VARIABLE IMMUNODEFICIENCY: Primary | ICD-10-CM

## 2025-06-19 LAB
ALBUMIN SERPL-MCNC: 4.8 G/DL (ref 3.5–5.2)
ALBUMIN/GLOB SERPL: 1.7 G/DL
ALP SERPL-CCNC: 113 U/L (ref 39–117)
ALT SERPL W P-5'-P-CCNC: 12 U/L (ref 1–33)
ANION GAP SERPL CALCULATED.3IONS-SCNC: 11.6 MMOL/L (ref 5–15)
AST SERPL-CCNC: 19 U/L (ref 1–32)
BASOPHILS # BLD AUTO: 0.03 10*3/MM3 (ref 0–0.2)
BASOPHILS NFR BLD AUTO: 0.4 % (ref 0–1.5)
BILIRUB SERPL-MCNC: 0.3 MG/DL (ref 0–1.2)
BUN SERPL-MCNC: 13.4 MG/DL (ref 8–23)
BUN/CREAT SERPL: 18.9 (ref 7–25)
CALCIUM SPEC-SCNC: 9.4 MG/DL (ref 8.6–10.5)
CHLORIDE SERPL-SCNC: 100 MMOL/L (ref 98–107)
CO2 SERPL-SCNC: 27.4 MMOL/L (ref 22–29)
CREAT SERPL-MCNC: 0.71 MG/DL (ref 0.57–1)
DEPRECATED RDW RBC AUTO: 43.2 FL (ref 37–54)
EGFRCR SERPLBLD CKD-EPI 2021: 96.3 ML/MIN/1.73
EOSINOPHIL # BLD AUTO: 0.03 10*3/MM3 (ref 0–0.4)
EOSINOPHIL NFR BLD AUTO: 0.4 % (ref 0.3–6.2)
ERYTHROCYTE [DISTWIDTH] IN BLOOD BY AUTOMATED COUNT: 13.1 % (ref 12.3–15.4)
GLOBULIN UR ELPH-MCNC: 2.8 GM/DL
GLUCOSE SERPL-MCNC: 104 MG/DL (ref 65–99)
HCT VFR BLD AUTO: 43.8 % (ref 34–46.6)
HGB BLD-MCNC: 14.6 G/DL (ref 12–15.9)
IGG1 SER-MCNC: 1182 MG/DL (ref 700–1600)
IMM GRANULOCYTES # BLD AUTO: 0.02 10*3/MM3 (ref 0–0.05)
IMM GRANULOCYTES NFR BLD AUTO: 0.2 % (ref 0–0.5)
LYMPHOCYTES # BLD AUTO: 2.79 10*3/MM3 (ref 0.7–3.1)
LYMPHOCYTES NFR BLD AUTO: 34.6 % (ref 19.6–45.3)
MCH RBC QN AUTO: 30.1 PG (ref 26.6–33)
MCHC RBC AUTO-ENTMCNC: 33.3 G/DL (ref 31.5–35.7)
MCV RBC AUTO: 90.3 FL (ref 79–97)
MONOCYTES # BLD AUTO: 0.51 10*3/MM3 (ref 0.1–0.9)
MONOCYTES NFR BLD AUTO: 6.3 % (ref 5–12)
NEUTROPHILS NFR BLD AUTO: 4.68 10*3/MM3 (ref 1.7–7)
NEUTROPHILS NFR BLD AUTO: 58.1 % (ref 42.7–76)
NRBC BLD AUTO-RTO: 0 /100 WBC (ref 0–0.2)
PLATELET # BLD AUTO: 286 10*3/MM3 (ref 140–450)
PMV BLD AUTO: 11.3 FL (ref 6–12)
POTASSIUM SERPL-SCNC: 3.8 MMOL/L (ref 3.5–5.2)
PROT SERPL-MCNC: 7.6 G/DL (ref 6–8.5)
RBC # BLD AUTO: 4.85 10*6/MM3 (ref 3.77–5.28)
SODIUM SERPL-SCNC: 139 MMOL/L (ref 136–145)
WBC NRBC COR # BLD AUTO: 8.06 10*3/MM3 (ref 3.4–10.8)

## 2025-06-19 PROCEDURE — 96365 THER/PROPH/DIAG IV INF INIT: CPT

## 2025-06-19 PROCEDURE — 25010000002 IMMUNE GLOBULIN (HUMAN) 5 GM/50ML SOLUTION: Performed by: ALLERGY & IMMUNOLOGY

## 2025-06-19 PROCEDURE — 25010000002 IMMUNE GLOBULIN (HUMAN) 20 GM/200ML SOLUTION: Performed by: ALLERGY & IMMUNOLOGY

## 2025-06-19 PROCEDURE — 80053 COMPREHEN METABOLIC PANEL: CPT | Performed by: ALLERGY & IMMUNOLOGY

## 2025-06-19 PROCEDURE — 82784 ASSAY IGA/IGD/IGG/IGM EACH: CPT | Performed by: ALLERGY & IMMUNOLOGY

## 2025-06-19 PROCEDURE — 25010000002 IMMUNE GLOBULIN (HUMAN) 10 GM/100ML SOLUTION: Performed by: ALLERGY & IMMUNOLOGY

## 2025-06-19 PROCEDURE — 96366 THER/PROPH/DIAG IV INF ADDON: CPT

## 2025-06-19 PROCEDURE — 85025 COMPLETE CBC W/AUTO DIFF WBC: CPT | Performed by: ALLERGY & IMMUNOLOGY

## 2025-06-19 RX ORDER — DIPHENHYDRAMINE HCL 25 MG
50 CAPSULE ORAL ONCE AS NEEDED
Start: 2025-07-17

## 2025-06-19 RX ADMIN — IMMUNE GLOBULIN (HUMAN) 20 G: 10 INJECTION INTRAVENOUS; SUBCUTANEOUS at 14:57

## 2025-06-19 RX ADMIN — IMMUNE GLOBULIN (HUMAN) 5 G: 10 INJECTION INTRAVENOUS; SUBCUTANEOUS at 13:23

## 2025-06-19 RX ADMIN — IMMUNE GLOBULIN (HUMAN) 10 G: 10 INJECTION INTRAVENOUS; SUBCUTANEOUS at 13:51

## 2025-07-02 ENCOUNTER — HOSPITAL ENCOUNTER (OUTPATIENT)
Dept: BONE DENSITY | Facility: HOSPITAL | Age: 60
Discharge: HOME | End: 2025-07-02
Payer: MEDICARE

## 2025-07-02 ENCOUNTER — HOSPITAL ENCOUNTER (OUTPATIENT)
Dept: MAMMOGRAPHY | Facility: HOSPITAL | Age: 60
Discharge: HOME | End: 2025-07-02
Payer: MEDICARE

## 2025-07-02 DIAGNOSIS — Z12.31 VISIT FOR SCREENING MAMMOGRAM: ICD-10-CM

## 2025-07-02 DIAGNOSIS — M85.89 OTHER SPECIFIED DISORDERS OF BONE DENSITY AND STRUCTURE, MULTIPLE SITES: ICD-10-CM

## 2025-07-02 PROCEDURE — 77080 DXA BONE DENSITY AXIAL: CPT

## 2025-07-02 PROCEDURE — 77067 SCR MAMMO BI INCL CAD: CPT

## 2025-07-02 PROCEDURE — 77063 BREAST TOMOSYNTHESIS BI: CPT

## 2025-07-17 ENCOUNTER — HOSPITAL ENCOUNTER (OUTPATIENT)
Dept: INFUSION THERAPY | Facility: HOSPITAL | Age: 60
Discharge: HOME | End: 2025-07-17
Admitting: ALLERGY & IMMUNOLOGY
Payer: MEDICARE

## 2025-07-17 ENCOUNTER — DOCUMENTATION (OUTPATIENT)
Dept: SLEEP MEDICINE | Facility: HOSPITAL | Age: 60
End: 2025-07-17
Payer: MEDICARE

## 2025-07-17 VITALS
TEMPERATURE: 97.9 F | HEART RATE: 68 BPM | SYSTOLIC BLOOD PRESSURE: 124 MMHG | WEIGHT: 180.56 LBS | DIASTOLIC BLOOD PRESSURE: 76 MMHG | BODY MASS INDEX: 30.83 KG/M2 | HEIGHT: 64 IN | RESPIRATION RATE: 20 BRPM | OXYGEN SATURATION: 100 %

## 2025-07-17 DIAGNOSIS — G47.419 NARCOLEPSY WITHOUT CATAPLEXY: ICD-10-CM

## 2025-07-17 DIAGNOSIS — D83.9 COMMON VARIABLE IMMUNODEFICIENCY: Primary | ICD-10-CM

## 2025-07-17 PROCEDURE — 96366 THER/PROPH/DIAG IV INF ADDON: CPT

## 2025-07-17 PROCEDURE — 25010000002 IMMUNE GLOBULIN (HUMAN) 10 GM/100ML SOLUTION: Performed by: ALLERGY & IMMUNOLOGY

## 2025-07-17 PROCEDURE — 25010000002 IMMUNE GLOBULIN (HUMAN) 20 GM/200ML SOLUTION: Performed by: ALLERGY & IMMUNOLOGY

## 2025-07-17 PROCEDURE — 25010000002 IMMUNE GLOBULIN (HUMAN) 5 GM/50ML SOLUTION: Performed by: ALLERGY & IMMUNOLOGY

## 2025-07-17 PROCEDURE — 96365 THER/PROPH/DIAG IV INF INIT: CPT

## 2025-07-17 RX ORDER — DEXTROAMPHETAMINE SACCHARATE, AMPHETAMINE ASPARTATE, DEXTROAMPHETAMINE SULFATE AND AMPHETAMINE SULFATE 2.5; 2.5; 2.5; 2.5 MG/1; MG/1; MG/1; MG/1
10 TABLET ORAL
Qty: 90 TABLET | Refills: 0 | Status: SHIPPED | OUTPATIENT
Start: 2025-07-17

## 2025-07-17 RX ORDER — DIPHENHYDRAMINE HCL 25 MG
50 CAPSULE ORAL ONCE AS NEEDED
Start: 2025-08-14

## 2025-07-17 RX ORDER — DEXTROAMPHETAMINE SACCHARATE, AMPHETAMINE ASPARTATE MONOHYDRATE, DEXTROAMPHETAMINE SULFATE AND AMPHETAMINE SULFATE 5; 5; 5; 5 MG/1; MG/1; MG/1; MG/1
20 CAPSULE, EXTENDED RELEASE ORAL EVERY MORNING
Qty: 90 CAPSULE | Refills: 0 | Status: SHIPPED | OUTPATIENT
Start: 2025-07-17

## 2025-07-17 RX ADMIN — IMMUNE GLOBULIN (HUMAN) 20 G: 10 INJECTION INTRAVENOUS; SUBCUTANEOUS at 15:07

## 2025-07-17 RX ADMIN — IMMUNE GLOBULIN (HUMAN) 10 G: 10 INJECTION INTRAVENOUS; SUBCUTANEOUS at 14:04

## 2025-07-17 RX ADMIN — IMMUNE GLOBULIN (HUMAN) 5 G: 10 INJECTION INTRAVENOUS; SUBCUTANEOUS at 13:29

## 2025-07-17 NOTE — TELEPHONE ENCOUNTER
Pt picks up RX from [facility]. This is not in the PDMP.  Last refill was 30 day to start June 13.   She previously had 90 day prescriptions for narcolepsy.  90 day supply ordered.

## 2025-07-17 NOTE — TELEPHONE ENCOUNTER
Patient would like prescription written for 90 days. She states Dr. Nelson has always done it this way for her.

## 2025-07-22 ENCOUNTER — OFFICE VISIT (OUTPATIENT)
Dept: SLEEP MEDICINE | Facility: HOSPITAL | Age: 60
End: 2025-07-22
Payer: MEDICARE

## 2025-07-22 VITALS
HEART RATE: 77 BPM | BODY MASS INDEX: 30.9 KG/M2 | WEIGHT: 181 LBS | HEIGHT: 64 IN | OXYGEN SATURATION: 98 % | DIASTOLIC BLOOD PRESSURE: 79 MMHG | SYSTOLIC BLOOD PRESSURE: 114 MMHG

## 2025-07-22 DIAGNOSIS — G47.00 INSOMNIA, UNSPECIFIED TYPE: ICD-10-CM

## 2025-07-22 DIAGNOSIS — G47.33 OSA (OBSTRUCTIVE SLEEP APNEA): Primary | ICD-10-CM

## 2025-07-22 DIAGNOSIS — G47.419 NARCOLEPSY WITHOUT CATAPLEXY: ICD-10-CM

## 2025-07-22 PROCEDURE — 99214 OFFICE O/P EST MOD 30 MIN: CPT | Performed by: STUDENT IN AN ORGANIZED HEALTH CARE EDUCATION/TRAINING PROGRAM

## 2025-07-22 PROCEDURE — G0463 HOSPITAL OUTPT CLINIC VISIT: HCPCS | Performed by: STUDENT IN AN ORGANIZED HEALTH CARE EDUCATION/TRAINING PROGRAM

## 2025-07-22 PROCEDURE — 1159F MED LIST DOCD IN RCRD: CPT | Performed by: STUDENT IN AN ORGANIZED HEALTH CARE EDUCATION/TRAINING PROGRAM

## 2025-07-22 PROCEDURE — 1160F RVW MEDS BY RX/DR IN RCRD: CPT | Performed by: STUDENT IN AN ORGANIZED HEALTH CARE EDUCATION/TRAINING PROGRAM

## 2025-07-22 NOTE — PROGRESS NOTES
[facility]    SLEEP CLINIC FOLLOW UP PROGRESS NOTE.    Maia Hui  [id number]   1965  60 y.o.  female      PCP: Mike Keen MD    Date of visit: 7/22/2025    No chief complaint on file.      HPI:  This is a 60 y.o. years old patient is here for the management of narcolepsy, obstructive sleep apnea, possible REM sleep behavior disorder.    Sleep study from 2011 showed AHI 6.1 RDI 66.2 based off that study she was not a candidate for oral mandibular device or for inspire therapy.   She further underwent PSG done on 5/25/2025 which showed  Moderate obstructive sleep apnea with an RDI of 25.4/hr and AHI of 13.4/hr.   This was a limited study due to total sleep time of 174.5 minutes and there was limited amount of REM sleep of 11.7% total sleep time.  This study was negative for REM without Atonia however there was only 20.5 minutes of REM sleep.  She goes to bed at 11 PM and gets up at 8 AM.  She still wakes up many times during the night due to pain or noise or uncertain reasons and does have trouble going back to sleep sometimes.     She has a history of narcolepsy and obstructive sleep apnea, for which she is currently on Adderall 20 mg extended-release in the morning and 10 mg immediate-release in the afternoon.   She continues to have some troubles with insomnia at night and has not had any CBT session yet.   She has a long-standing history of insomnia.  Since last visit she has not had any episodes waking up remembering dreams where she had dream enactment behavior.  She no longer shares a bed with her . She has never walked in her sleep.   She is very cautious about driving due to her condition. She has tried modafinil (Provigil) in the past but found it intolerable.  She denies chest pain, syncope, shortness of breath, vision changes, suicidal ideation.    She has consulted her cardiologist for palpitations, which are most noticeable at night when she is attempting to rest.  "These palpitations are not severe but have increased in frequency. They do not cause dizziness or disorientation. She has a history of premature ventricular contractions (PVCs) prior to starting Adderall. Given her family history of cardiac issues, she is vigilant about monitoring her condition.    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  Obstructive sleep apnea  PVCs  Common variable immunodeficiency    ESS: 3    PAP download data dates June 22 through July 21  Device: AirSense 10  Pressure : 8  % days used >4 hours: 100  Average usage days used: 8 hours 12 minutes  AHI: 7.4, obstructive apneas 5  Median leak: 0  95th % leak 7.6  DME supplier: [employer] (habits pertaining to sleep medicine)  History tobacco use: None  History of alcohol use: None per week  Caffeine use: 1 drink per day  OB History    No obstetric history on file.         REVIEW OF SYSTEMS:   Pertaining positive symptoms are:  Dry mouth      PHYSICAL EXAMINATION:  CONSTITUTIONAL:  Vitals:    07/22/25 1100   BP: 114/79   BP Location: Left arm   Patient Position: Sitting   Pulse: 77   SpO2: 98%   Weight: 82.1 kg (181 lb)   Height: 162.6 cm (64.02\")    Body mass index is 31.05 kg/m².   RESP SYSTEM: No respiratory distress  CARDIOVASULAR: Regular rate  NEURO: Answers questions appropriately        She has one episode of waking up.   She was not sure with epsidode of parasomnia.   On adderall  Referral to dentist   Increase cpap pressure   cbt      ASSESSMENT AND PLAN:  Diagnoses and all orders for this visit:    1. DILCIA (obstructive sleep apnea) (Primary)  -     PAP Therapy  -     Ambulatory Referral to Dentistry    2. Narcolepsy without cataplexy    3. Insomnia, unspecified type    Continue CPAP use at this time. Referral sent to dentistry per patient request to see if candidate for oral appliance.   Continue Adderall 20 mg extended release in the morning and 10 mg immediate release in afternoon  She is not having cardiovascular symptoms " on blood pressure is 114/79 today.  She has not had any episodes of acting out dreams since last visit. Discussed to let us know if she has any again.   Continue with referral for CBT-insomnia.    I have independently reviewed the PAP compliance data and discussed with the patient the download data and encouarged the patient to continue to use the device. The device is benefiting the patient and the device is medically necessary.  The patient is also instructed to get the supplies from the [facility] and and change them on a regular basis.  A prescription for supplies has been sent to the [facility].     Return in about 6 months (around 1/22/2026). . Patient's questions were answered.      Jhonny Gerber, DO    I have spent 30 minutes today on this encounter before, during and after the visit interviewing the patient and formulating my assessment and plan.

## 2025-07-23 ENCOUNTER — TELEPHONE (OUTPATIENT)
Dept: SLEEP MEDICINE | Facility: HOSPITAL | Age: 60
End: 2025-07-23
Payer: MEDICARE

## 2025-08-11 ENCOUNTER — HOSPITAL ENCOUNTER (OUTPATIENT)
Dept: INFUSION THERAPY | Facility: HOSPITAL | Age: 60
Discharge: HOME | End: 2025-08-11
Admitting: ALLERGY & IMMUNOLOGY
Payer: MEDICARE

## 2025-08-11 VITALS
BODY MASS INDEX: 30.75 KG/M2 | TEMPERATURE: 97.6 F | WEIGHT: 180.12 LBS | HEART RATE: 63 BPM | RESPIRATION RATE: 20 BRPM | SYSTOLIC BLOOD PRESSURE: 140 MMHG | OXYGEN SATURATION: 99 % | DIASTOLIC BLOOD PRESSURE: 89 MMHG | HEIGHT: 64 IN

## 2025-08-11 DIAGNOSIS — D83.9 COMMON VARIABLE IMMUNODEFICIENCY: Primary | ICD-10-CM

## 2025-08-11 PROCEDURE — 25010000002 IMMUNE GLOBULIN (HUMAN) 10 GM/100ML SOLUTION: Performed by: ALLERGY & IMMUNOLOGY

## 2025-08-11 PROCEDURE — 96365 THER/PROPH/DIAG IV INF INIT: CPT

## 2025-08-11 PROCEDURE — 25010000002 IMMUNE GLOBULIN (HUMAN) 20 GM/200ML SOLUTION: Performed by: ALLERGY & IMMUNOLOGY

## 2025-08-11 PROCEDURE — 96366 THER/PROPH/DIAG IV INF ADDON: CPT

## 2025-08-11 PROCEDURE — 25010000002 IMMUNE GLOBULIN (HUMAN) 5 GM/50ML SOLUTION: Performed by: ALLERGY & IMMUNOLOGY

## 2025-08-11 RX ORDER — DIPHENHYDRAMINE HCL 25 MG
50 CAPSULE ORAL ONCE AS NEEDED
Start: 2025-09-08

## 2025-08-11 RX ADMIN — IMMUNE GLOBULIN (HUMAN) 10 G: 10 INJECTION INTRAVENOUS; SUBCUTANEOUS at 14:06

## 2025-08-11 RX ADMIN — IMMUNE GLOBULIN (HUMAN) 20 G: 10 INJECTION INTRAVENOUS; SUBCUTANEOUS at 15:12

## 2025-08-11 RX ADMIN — IMMUNE GLOBULIN (HUMAN) 5 G: 10 INJECTION INTRAVENOUS; SUBCUTANEOUS at 13:26

## (undated) DEVICE — ANTIBACTERIAL UNDYED BRAIDED (POLYGLACTIN 910), SYNTHETIC ABSORBABLE SUTURE: Brand: COATED VICRYL

## (undated) DEVICE — COVER,MAYO STAND,STERILE: Brand: MEDLINE

## (undated) DEVICE — ENDOPATH XCEL BLADELESS TROCARS WITH STABILITY SLEEVES: Brand: ENDOPATH XCEL

## (undated) DEVICE — SUT PDS 0 CT1 36IN Z346H

## (undated) DEVICE — SOL NS 500ML

## (undated) DEVICE — SAFESECURE,SECUREMENT,FOLEY CATH,STERILE: Brand: MEDLINE

## (undated) DEVICE — SUT PDS 2/0 SH 27IN Z317H

## (undated) DEVICE — MEDICINE CUP, GRADUATED, STER: Brand: MEDLINE

## (undated) DEVICE — SUT VIC 0/0 UR6 27IN DYED J603H

## (undated) DEVICE — DECANT BG O JET

## (undated) DEVICE — VIOLET BRAIDED (POLYGLACTIN 910), SYNTHETIC ABSORBABLE SUTURE: Brand: COATED VICRYL

## (undated) DEVICE — PK HYST VAG 40

## (undated) DEVICE — LAPAROSCOPIC SMOKE FILTRATION SYSTEM: Brand: PALL LAPAROSHIELD® PLUS LAPAROSCOPIC SMOKE FILTRATION SYSTEM

## (undated) DEVICE — COLUMN DRAPE

## (undated) DEVICE — LAPAROVUE VISIBILITY SYSTEM LAPAROSCOPIC SOLUTIONS: Brand: LAPAROVUE

## (undated) DEVICE — CATH FOL SIMPLYLATEX SILELAST 18F 17IN

## (undated) DEVICE — SYRINGE, LUER LOCK, 60ML: Brand: MEDLINE

## (undated) DEVICE — SUT PDS 2/0 CT1 27IN DYED Z339H

## (undated) DEVICE — IRRIGATOR BULB ASEPTO 60CC STRL

## (undated) DEVICE — DRAPE,UNDERBUTTOCKS,PCH,STERILE: Brand: MEDLINE

## (undated) DEVICE — COUNT NDL MAG FM/BLCK 40COUNT

## (undated) DEVICE — SYR LUERLOK 30CC

## (undated) DEVICE — SUT PROLN 2/0 SH 36IN 8523H

## (undated) DEVICE — INTENDED FOR TISSUE SEPARATION, AND OTHER PROCEDURES THAT REQUIRE A SHARP SURGICAL BLADE TO PUNCTURE OR CUT.: Brand: BARD-PARKER ® CARBON RIB-BACK BLADES

## (undated) DEVICE — 3M™ STERI-DRAPE™ INSTRUMENT POUCH 1018L: Brand: STERI-DRAPE™

## (undated) DEVICE — SUT MNCRYL PLS ANTIB UD 4/0 PS2 18IN

## (undated) DEVICE — GLV SURG PREMIERPRO ORTHO LTX PF SZ8 BRN

## (undated) DEVICE — LOU LITHOTOMY ROBOTIC: Brand: MEDLINE INDUSTRIES, INC.

## (undated) DEVICE — 3M™ STERI-STRIP™ COMPOUND BENZOIN TINCTURE 40 BAGS/CARTON 4 CARTONS/CASE C1544: Brand: 3M™ STERI-STRIP™

## (undated) DEVICE — SOL ANTISTICK CAUTRY ELECTROLUBE LF

## (undated) DEVICE — SKIN PREP TRAY W/CHG: Brand: MEDLINE INDUSTRIES, INC.

## (undated) DEVICE — DECANTER BAG 9": Brand: MEDLINE INDUSTRIES, INC.

## (undated) DEVICE — DRSNG WND BORDR/ADHS NONADHR/GZ LF 2X2IN STRL

## (undated) DEVICE — DRAPE,REIN 53X77,STERILE: Brand: MEDLINE

## (undated) DEVICE — ARM DRAPE

## (undated) DEVICE — BNDG ADHS PLSTC 1X3IN LF

## (undated) DEVICE — INTENT TO BE USED WITH SUTURE MATERIAL FOR TISSUE CLOSURE: Brand: RICHARD-ALLAN® NEEDLE 1/2 CIRCLE TAPER

## (undated) DEVICE — SOL NACL 0.9PCT 1000ML

## (undated) DEVICE — SEAL

## (undated) DEVICE — MANIP UTER RUMI 2 KOH EFFICIENT 3CM BL

## (undated) DEVICE — MANIP UTER RUMI TP 6.7MMX8CM BLU

## (undated) DEVICE — SUT VIC 0 CT1 27IN DYED J340H

## (undated) DEVICE — TIP COVER ACCESSORY

## (undated) DEVICE — DEV SUT GRSPR CLOSUR 15CM 14G

## (undated) DEVICE — SUT VIC 0 TIES 18IN J912G

## (undated) DEVICE — BLADELESS OBTURATOR: Brand: WECK VISTA

## (undated) DEVICE — SUT MNCRYL 2/0 SH 27IN Y317H

## (undated) DEVICE — THE STERILE LIGHT HANDLE COVER IS USED WITH STERIS SURGICAL LIGHTING AND VISUALIZATION SYSTEMS.

## (undated) DEVICE — NEEDLE, QUINCKE, 18GX3.5": Brand: MEDLINE